# Patient Record
Sex: FEMALE | Race: BLACK OR AFRICAN AMERICAN | Employment: OTHER | URBAN - METROPOLITAN AREA
[De-identification: names, ages, dates, MRNs, and addresses within clinical notes are randomized per-mention and may not be internally consistent; named-entity substitution may affect disease eponyms.]

---

## 2017-01-06 ENCOUNTER — HOSPITAL ENCOUNTER (OUTPATIENT)
Dept: MRI IMAGING | Age: 62
Discharge: HOME | End: 2017-01-06
Attending: ORTHOPAEDIC SURGERY
Payer: COMMERCIAL

## 2017-01-06 VITALS — WEIGHT: 254 LBS | BODY MASS INDEX: 42.93 KG/M2

## 2017-01-06 LAB — CREAT BLD-MCNC: 0.7 MG/DL (ref 0.6–1.3)

## 2017-01-06 PROCEDURE — 71552 MRI CHEST W/O & W/DYE: CPT

## 2017-01-06 PROCEDURE — A9585 GADOBUTROL INJECTION: HCPCS | Performed by: ORTHOPAEDIC SURGERY

## 2017-01-06 PROCEDURE — 82565 ASSAY OF CREATININE: CPT

## 2017-01-06 PROCEDURE — 74011250636 HC RX REV CODE- 250/636: Performed by: ORTHOPAEDIC SURGERY

## 2017-01-06 RX ADMIN — GADOBUTROL 11 ML: 604.72 INJECTION INTRAVENOUS at 16:35

## 2017-02-16 ENCOUNTER — HOSPITAL ENCOUNTER (OUTPATIENT)
Dept: CT IMAGING | Age: 62
Discharge: HOME | End: 2017-02-16
Attending: ORTHOPAEDIC SURGERY
Payer: COMMERCIAL

## 2017-02-16 VITALS
OXYGEN SATURATION: 98 % | SYSTOLIC BLOOD PRESSURE: 136 MMHG | BODY MASS INDEX: 43.02 KG/M2 | WEIGHT: 252 LBS | TEMPERATURE: 97.2 F | RESPIRATION RATE: 18 BRPM | HEART RATE: 68 BPM | HEIGHT: 64 IN | DIASTOLIC BLOOD PRESSURE: 70 MMHG

## 2017-02-16 DIAGNOSIS — M19.011 OSTEOARTHRITIS OF STERNOCLAVICULAR JOINT, RIGHT: ICD-10-CM

## 2017-02-16 DIAGNOSIS — M25.419 EFFUSION OF STERNOCLAVICULAR JOINT: ICD-10-CM

## 2017-02-16 LAB
APPEARANCE FLD: ABNORMAL
COLOR FLD: ABNORMAL
LYMPHOCYTES NFR FLD: 1 %
MONOS+MACROS NFR FLD: 1 %
NEUTS SEG NFR FLD: 98 %
NUC CELL # FLD: 144 /CU MM (ref 0–5)
RBC # FLD: >100 /CU MM
SPECIMEN SOURCE FLD: ABNORMAL

## 2017-02-16 PROCEDURE — 77030003375 HC MRK BIOP BEEK -A

## 2017-02-16 PROCEDURE — 87205 SMEAR GRAM STAIN: CPT | Performed by: ORTHOPAEDIC SURGERY

## 2017-02-16 PROCEDURE — 74011250636 HC RX REV CODE- 250/636: Performed by: RADIOLOGY

## 2017-02-16 PROCEDURE — 87075 CULTR BACTERIA EXCEPT BLOOD: CPT | Performed by: ORTHOPAEDIC SURGERY

## 2017-02-16 PROCEDURE — 10022 CT FINE NDL ASPIR W IMAGE: CPT

## 2017-02-16 PROCEDURE — 74011000250 HC RX REV CODE- 250: Performed by: RADIOLOGY

## 2017-02-16 PROCEDURE — 89050 BODY FLUID CELL COUNT: CPT | Performed by: ORTHOPAEDIC SURGERY

## 2017-02-16 RX ORDER — FENTANYL CITRATE 50 UG/ML
200 INJECTION, SOLUTION INTRAMUSCULAR; INTRAVENOUS
Status: DISCONTINUED | OUTPATIENT
Start: 2017-02-16 | End: 2017-02-20 | Stop reason: HOSPADM

## 2017-02-16 RX ORDER — MIDAZOLAM HYDROCHLORIDE 1 MG/ML
5 INJECTION, SOLUTION INTRAMUSCULAR; INTRAVENOUS
Status: DISCONTINUED | OUTPATIENT
Start: 2017-02-16 | End: 2017-02-20 | Stop reason: HOSPADM

## 2017-02-16 RX ORDER — SODIUM CHLORIDE 9 MG/ML
25 INJECTION, SOLUTION INTRAVENOUS CONTINUOUS
Status: DISCONTINUED | OUTPATIENT
Start: 2017-02-16 | End: 2017-02-20 | Stop reason: HOSPADM

## 2017-02-16 RX ORDER — CHROMIUM PICOLINATE 200 MCG
TABLET ORAL
COMMUNITY
End: 2019-09-13

## 2017-02-16 RX ADMIN — SODIUM CHLORIDE 25 ML/HR: 900 INJECTION, SOLUTION INTRAVENOUS at 09:56

## 2017-02-16 RX ADMIN — SODIUM BICARBONATE 2 ML: 0.2 INJECTION, SOLUTION INTRAVENOUS at 11:10

## 2017-02-16 RX ADMIN — MIDAZOLAM HYDROCHLORIDE 2 MG: 1 INJECTION, SOLUTION INTRAMUSCULAR; INTRAVENOUS at 10:56

## 2017-02-16 RX ADMIN — FENTANYL CITRATE 50 MCG: 50 INJECTION, SOLUTION INTRAMUSCULAR; INTRAVENOUS at 10:56

## 2017-02-16 NOTE — PROGRESS NOTES
Pt discharged via wheelchair escorted by son, discharge instructions given. Pt verbalizes understanding.  Pt denies pain or discomfort

## 2017-02-16 NOTE — H&P
Interventional Radiology History and Physical (Outpatient)    2/16/2017    Patient: Lynne Mackey 59 y.o. female     Referring Physician:  Sugar Mcpherson DO    Chief Complaint: need SC joint aspiration    History of Present Illness: abnormal right SC joint    History:  Past Medical History   Diagnosis Date    Arthritis      BOTH HIPS PAINFUL    Chronic pain     GERD (gastroesophageal reflux disease)     Spinal stenosis, lumbar      Family History   Problem Relation Age of Onset    Other Mother      CARDIAC ARREST DURING HIATAL HERNIA/REFLUX SURGERY;  ALSO, DOES NOT EVARISTO IV DYE    Hypertension Mother     Heart Disease Mother     Hypertension Father     Dementia Father     Heart Disease Father     Elevated Lipids Father      Social History     Social History    Marital status:      Spouse name: N/A    Number of children: N/A    Years of education: N/A     Occupational History    Not on file. Social History Main Topics    Smoking status: Never Smoker    Smokeless tobacco: Never Used    Alcohol use Yes      Comment: RARE ALCOHOL    Drug use: No    Sexual activity: Not on file     Other Topics Concern    Not on file     Social History Narrative       Allergies: Allergies   Allergen Reactions    Aspirin Palpitations    Milk Prot-Turm-Pepper-Pinea Ex Diarrhea     CAN EAT ICE CREAM & CHEESE & BUTTER. DRINKS LACTAID    Minocycline Other (comments)     Told by MD she is allergic. She does not know the reaction. Prior to Admission Medications:  Prior to Admission medications    Medication Sig Start Date End Date Taking? Authorizing Provider   Calcium-Cholecalciferol, D3, 600 mg(1,500mg) -400 unit cap Take  by mouth. Yes Historical Provider   oxyCODONE IR (ROXICODONE) 5 mg immediate release tablet Take 1-2 Tabs by mouth every three (3) hours as needed.  Max Daily Amount: 80 mg. 4/8/15   Jacque Tafoya PA-C   guaiFENesin (MUCINEX) 1,200 mg Ta12 ER tablet Take 1,200 mg by mouth two (2) times a day. Indications: COUGH    Historical Provider   docusate sodium (COLACE) 100 mg capsule Take 100 mg by mouth two (2) times a day. Historical Provider   promethazine-phenyleph-codeine (PROMETHAZINE VC-CODEINE) 6.25-5-10 mg/5 mL syrp Take 5 mL by mouth every six (6) hours as needed for Cough. Historical Provider   gabapentin (NEURONTIN) 300 mg capsule Take 300 mg by mouth nightly. Historical Provider   ACYCLOVIR PO Take  by mouth as needed. Historical Provider       Physical Exam:    Blood pressure 136/70, pulse 68, temperature 97.2 °F (36.2 °C), resp. rate 18, height 5' 4\" (1.626 m), weight 114.3 kg (252 lb), SpO2 98 %. General: alert, cooperative, no distress, appears stated age  Heart: rrr  Lungs: clear to auscultation bilaterally  Abdomen: soft  Neuro: grossly intact  Extremities: extremities wnl    Plan of Care/Planned Procedure:  Risks, benefits, and alternatives reviewed with patient and she agrees to proceed with the procedure.      Jalyn Larios MD

## 2017-02-16 NOTE — DISCHARGE INSTRUCTIONS
BIOPSY DISCHARGE INSTRUCTIONS    General Instructions:     A biopsy is the removal of a small piece of tissue for microscopic examination or testing. Healthy tissue can be obtained for the purpose of tissue-type matching for transplants. Unhealthy tissues are more commonly biopsied to diagnose disease. Spinal Biopsy: This test is sometimes done in conjunction with other procedures. Your clavicle will be sore, as we are taking a small sample of bone and fluid, which is slightly more difficult to sample than tissue. General Biopsy:     A fluid collection can grow in any area of the body, and we are taking a specimen as ordered by your doctor. The risks are the same. They include bleeding, pain, and infection. Home Care Instructions: You may resume your regular diet and medication regimen. Do not drink alcohol, drive, or make any important legal decisions in the next 24 hours. Do not lift anything heavier than a gallon of milk until the soreness goes away. You may use over the counter acetaminophen or ibuprofen for the soreness. You may apply an ice pack to the affected area for 20-30 minutes at time for the first 24 hours. After that, you may apply a heat pack. Call If:     You should call your Physician and/or the Radiology Nurse if you have any questions or concerns about the biopsy site. Call if you should have increased pain, fever, redness, drainage, or bleeding more than a small spot on the bandage. Follow-Up Instructions: Please see your ordering doctor as he/she has requested.       To Reach Us:  Call [phone] and ask for nurse on call      Patient Signature:  Date: 2/16/2017  Discharging Nurse: Yolanda Silvestre RN

## 2017-03-02 LAB
BACTERIA SPEC CULT: NORMAL
BACTERIA SPEC CULT: NORMAL
GRAM STN SPEC: NORMAL
SERVICE CMNT-IMP: NORMAL
SERVICE CMNT-IMP: NORMAL

## 2018-04-09 ENCOUNTER — HOSPITAL ENCOUNTER (OUTPATIENT)
Dept: MRI IMAGING | Age: 63
Discharge: HOME | End: 2018-04-09
Attending: ORTHOPAEDIC SURGERY
Payer: COMMERCIAL

## 2018-04-09 DIAGNOSIS — M51.36 DEGENERATION OF LUMBAR INTERVERTEBRAL DISC: ICD-10-CM

## 2018-04-09 DIAGNOSIS — Z98.1 S/P LUMBAR FUSION: ICD-10-CM

## 2018-04-09 PROCEDURE — 72148 MRI LUMBAR SPINE W/O DYE: CPT

## 2019-03-20 ENCOUNTER — HOSPITAL ENCOUNTER (OUTPATIENT)
Dept: BONE DENSITY | Age: 64
Discharge: HOME | End: 2019-03-20
Attending: INTERNAL MEDICINE
Payer: MEDICARE

## 2019-03-20 DIAGNOSIS — Z78.0 MENOPAUSE: ICD-10-CM

## 2019-03-20 DIAGNOSIS — Z13.820 SCREENING FOR OSTEOPOROSIS: ICD-10-CM

## 2019-03-20 DIAGNOSIS — M89.9 DISORDER OF BONE: ICD-10-CM

## 2019-03-20 DIAGNOSIS — M15.9 PRIMARY OSTEOARTHRITIS INVOLVING MULTIPLE JOINTS: ICD-10-CM

## 2019-03-20 DIAGNOSIS — Z00.00 ROUTINE GENERAL MEDICAL EXAMINATION AT A HEALTH CARE FACILITY: ICD-10-CM

## 2019-03-20 DIAGNOSIS — M85.9 DISORDER OF BONE DENSITY AND STRUCTURE, UNSPECIFIED: ICD-10-CM

## 2019-03-20 DIAGNOSIS — E55.9 VITAMIN D DEFICIENCY: ICD-10-CM

## 2019-03-20 PROCEDURE — 77080 DXA BONE DENSITY AXIAL: CPT

## 2019-04-15 LAB — CREATININE, EXTERNAL: 0.77

## 2019-09-13 ENCOUNTER — HOSPITAL ENCOUNTER (EMERGENCY)
Age: 64
Discharge: HOME | End: 2019-09-13
Attending: EMERGENCY MEDICINE
Payer: MEDICARE

## 2019-09-13 VITALS
RESPIRATION RATE: 16 BRPM | WEIGHT: 259.7 LBS | OXYGEN SATURATION: 100 % | SYSTOLIC BLOOD PRESSURE: 172 MMHG | TEMPERATURE: 98.3 F | HEART RATE: 74 BPM | DIASTOLIC BLOOD PRESSURE: 89 MMHG | HEIGHT: 64 IN | BODY MASS INDEX: 44.34 KG/M2

## 2019-09-13 DIAGNOSIS — S62.667B OPEN NONDISPLACED FRACTURE OF DISTAL PHALANX OF LEFT LITTLE FINGER, INITIAL ENCOUNTER: ICD-10-CM

## 2019-09-13 DIAGNOSIS — S61.217A LACERATION OF LEFT LITTLE FINGER WITHOUT FOREIGN BODY WITHOUT DAMAGE TO NAIL, INITIAL ENCOUNTER: Primary | ICD-10-CM

## 2019-09-13 PROCEDURE — 99283 EMERGENCY DEPT VISIT LOW MDM: CPT

## 2019-09-13 PROCEDURE — 77030018846 HC SOL IRR STRL H20 ICUM -A

## 2019-09-13 PROCEDURE — 74011250637 HC RX REV CODE- 250/637: Performed by: EMERGENCY MEDICINE

## 2019-09-13 PROCEDURE — 77030002916 HC SUT ETHLN J&J -A

## 2019-09-13 PROCEDURE — 75810000293 HC SIMP/SUPERF WND  RPR

## 2019-09-13 PROCEDURE — 74011000250 HC RX REV CODE- 250

## 2019-09-13 PROCEDURE — 74011000250 HC RX REV CODE- 250: Performed by: EMERGENCY MEDICINE

## 2019-09-13 RX ORDER — CEPHALEXIN 250 MG/1
500 CAPSULE ORAL
Status: COMPLETED | OUTPATIENT
Start: 2019-09-13 | End: 2019-09-13

## 2019-09-13 RX ORDER — MELATONIN
COMMUNITY
Start: 2015-03-02

## 2019-09-13 RX ORDER — BACITRACIN 500 UNIT/G
1 PACKET (EA) TOPICAL
Status: COMPLETED | OUTPATIENT
Start: 2019-09-13 | End: 2019-09-13

## 2019-09-13 RX ORDER — CEPHALEXIN 500 MG/1
500 CAPSULE ORAL 3 TIMES DAILY
Qty: 30 CAP | Refills: 0 | Status: SHIPPED | OUTPATIENT
Start: 2019-09-13 | End: 2019-09-23

## 2019-09-13 RX ORDER — LIDOCAINE HYDROCHLORIDE 20 MG/ML
1 INJECTION, SOLUTION INFILTRATION; PERINEURAL ONCE
Status: COMPLETED | OUTPATIENT
Start: 2019-09-13 | End: 2019-09-13

## 2019-09-13 RX ORDER — CEPHALEXIN 500 MG/1
500 CAPSULE ORAL 3 TIMES DAILY
Qty: 30 CAP | Refills: 0 | Status: SHIPPED | OUTPATIENT
Start: 2019-09-13 | End: 2019-09-13

## 2019-09-13 RX ORDER — AMOXICILLIN 500 MG/1
500 CAPSULE ORAL
COMMUNITY
End: 2019-09-13

## 2019-09-13 RX ORDER — LIDOCAINE HYDROCHLORIDE 20 MG/ML
INJECTION, SOLUTION INFILTRATION; PERINEURAL
Status: COMPLETED
Start: 2019-09-13 | End: 2019-09-13

## 2019-09-13 RX ORDER — MONTELUKAST SODIUM 10 MG/1
TABLET ORAL
Refills: 0 | COMMUNITY
Start: 2019-08-19 | End: 2019-11-21

## 2019-09-13 RX ORDER — PRAVASTATIN SODIUM 10 MG/1
10 TABLET ORAL
Refills: 1 | COMMUNITY
Start: 2019-07-22

## 2019-09-13 RX ADMIN — LIDOCAINE HYDROCHLORIDE 20 MG: 20 INJECTION, SOLUTION INFILTRATION; PERINEURAL at 16:49

## 2019-09-13 RX ADMIN — CEPHALEXIN 500 MG: 250 CAPSULE ORAL at 15:42

## 2019-09-13 RX ADMIN — BACITRACIN 1 PACKET: 500 OINTMENT TOPICAL at 17:46

## 2019-09-13 NOTE — CONSULTS
ORTHO:    Telephone consult with Dr Javier Padilla who describes a 68yo female who sustained a left 5th digit using a . NVI per ER with horizontal laceration extending around the medial side of finger to volar side just proximal to the nailbed; no apparent nail involvement or loosening per ED attending. Recommend cleaning wound and loose approximation of skin as needed with splinting and follow up with Dr. Tri Bradley, call Monday for appointment. Dr Rupesh Childs aware of patient and in agreement with current plan    Thank you for allowing us to take part in this patients care.     Karlene Koenig PA-C  Orthopedic Trauma Service  [address] [facility]

## 2019-09-13 NOTE — DISCHARGE INSTRUCTIONS
Patient Education        Finger Fracture: Care Instructions  Your Care Instructions    Breaks in the bones of the finger usually heal well in about 3 to 4 weeks. The pain and swelling from a broken finger can last for weeks. But it should steadily improve, starting a few days after you break it. It is very important that you wear and take care of the cast or splint exactly as your doctor tells you to so that your finger heals properly and does not end up crooked. Wearing a splint may interfere with your normal activities. Ask for help with daily tasks if you need it. You heal best when you take good care of yourself. Eat a variety of healthy foods, and don't smoke. Follow-up care is a key part of your treatment and safety. Be sure to make and go to all appointments, and call your doctor if you are having problems. It's also a good idea to know your test results and keep a list of the medicines you take. How can you care for yourself at home? · If your doctor put a splint on your finger, wear the splint exactly as directed. Do not remove it until your doctor says that you can. · Keep your hand raised above the level of your heart as much as you can. This will help reduce swelling. · Put ice or a cold pack on your finger for 10 to 20 minutes at a time. Try to do this every 1 to 2 hours for the next 3 days (when you are awake) or until the swelling goes down. Put a thin cloth between the ice and your skin. Keep the splint dry. · Be safe with medicines. Take pain medicines exactly as directed. ? If the doctor gave you a prescription medicine for pain, take it as prescribed. ? If you are not taking a prescription pain medicine, ask your doctor if you can take an over-the-counter medicine. When should you call for help? Call 911 anytime you think you may need emergency care.  For example, call if:    · Your finger is cool or pale or changes color.    Call your doctor now or seek immediate medical care if:    · Your pain gets much worse.     · You have tingling, weakness, or numbness in your finger.     · You have signs of infection, such as:  ? Increased pain, swelling, warmth, or redness. ? Red streaks leading from the area. ? Pus draining from the area. ? Swollen lymph nodes in your neck, armpits, or groin. ? A fever.    Watch closely for changes in your health, and be sure to contact your doctor if:    · Your finger is not steadily improving. Where can you learn more? Go to http://jon-dimitry.info/. Enter O009 in the search box to learn more about \"Finger Fracture: Care Instructions. \"  Current as of: September 20, 2018  Content Version: 12.1  © 9519-0820 [employer]. Care instructions adapted under license by [employer] (which disclaims liability or warranty for this information). If you have questions about a medical condition or this instruction, always ask your healthcare professional. [place] [address] any warranty or liability for your use of this information. Patient Education        Cuts on the Hand Closed With Stitches: Care Instructions  Your Care Instructions    A cut on your hand can be on your fingers, your thumb, or the front or back of your hand. Sometimes a cut can injure the tendons, blood vessels, or nerves of your hand. The doctor used stitches to close the cut. Using stitches also helps the cut heal and reduces scarring. The doctor may have given you a splint to help prevent you from moving your hand, fingers, or thumb. If the cut went deep and through the skin, the doctor put in two layers of stitches. The deeper layer brings the deep part of the cut together. These stitches will dissolve and don't need to be removed. The stitches in the upper layer are the ones you see on the cut. You will probably have a bandage. You will need to have the stitches removed, usually in 7 to 14 days.  The doctor may suggest that you see a hand specialist if the cut is very deep or if you have trouble moving your fingers or have less feeling in your hand. The doctor has checked you carefully, but problems can develop later. If you notice any problems or new symptoms, get medical treatment right away. Follow-up care is a key part of your treatment and safety. Be sure to make and go to all appointments, and call your doctor if you are having problems. It's also a good idea to know your test results and keep a list of the medicines you take. How can you care for yourself at home? · Keep the cut dry for the first 24 to 48 hours. After this, you can shower if your doctor okays it. Pat the cut dry. · Don't soak the cut, such as in a bathtub. Your doctor will tell you when it's safe to get the cut wet. · If your doctor told you how to care for your cut, follow your doctor's instructions. If you did not get instructions, follow this general advice:  ? After the first 24 to 48 hours, wash around the cut with clean water 2 times a day. Don't use hydrogen peroxide or alcohol, which can slow healing. ? You may cover the cut with a thin layer of petroleum jelly, such as Vaseline, and a nonstick bandage. ? Apply more petroleum jelly and replace the bandage as needed. · Prop up the sore hand on a pillow anytime you sit or lie down during the next 3 days. Try to keep it above the level of your heart. This will help reduce swelling. · Avoid any activity that could cause your cut to reopen. · Do not remove the stitches on your own. Your doctor will tell you when to come back to have the stitches removed. · Be safe with medicines. Take pain medicines exactly as directed. ? If the doctor gave you a prescription medicine for pain, take it as prescribed. ? If you are not taking a prescription pain medicine, ask your doctor if you can take an over-the-counter medicine. When should you call for help?   Call your doctor now or seek immediate medical care if:    · You have new pain, or your pain gets worse.     · The skin near the cut is cold or pale or changes color.     · You have tingling, weakness, or numbness near the cut.     · The cut starts to bleed, and blood soaks through the bandage. Oozing small amounts of blood is normal.     · You have trouble moving the area of the hand near the cut.     · You have symptoms of infection, such as:  ? Increased pain, swelling, warmth, or redness around the cut.  ? Red streaks leading from the cut.  ? Pus draining from the cut.  ? A fever.    Watch closely for changes in your health, and be sure to contact your doctor if:    · You do not get better as expected. Where can you learn more? Go to http://jon-dimitry.info/. Enter T250 in the search box to learn more about \"Cuts on the Hand Closed With Stitches: Care Instructions. \"  Current as of: September 23, 2018  Content Version: 12.1  © 8159-3583 [employer]. Care instructions adapted under license by [employer] (which disclaims liability or warranty for this information). If you have questions about a medical condition or this instruction, always ask your healthcare professional. [address] any warranty or liability for your use of this information.

## 2019-09-13 NOTE — ED PROVIDER NOTES
HPI     68-year-old female [facility] for open fracture and laceration to left fifth finger from beulah celestin. Patient has a history of diabetes. No active bleeding at this time. Patient was updated with a [facility] at [facility].  Full range of motion of the finger. Denies any other injuries or complaints at this time.     Past Medical History:   Diagnosis Date    Arthritis     BOTH HIPS PAINFUL    Chronic pain     Diabetes (HCC)     GERD (gastroesophageal reflux disease)     High cholesterol     Spinal stenosis, lumbar        Past Surgical History:   Procedure Laterality Date    HX HEENT  1980s    VOCAL CORD POLYP EXCISED    HX ORTHOPAEDIC  1990s    FOOT SURGERY    HX ORTHOPAEDIC  May 2012    total hip replacement-RIGHT    HX ORTHOPAEDIC      back surgery    HX TONSILLECTOMY  CHILDHOOD         Family History:   Problem Relation Age of Onset    Other Mother         CARDIAC ARREST DURING HIATAL HERNIA/REFLUX SURGERY;  ALSO, DOES NOT EVARISTO IV DYE    Hypertension Mother     Heart Disease Mother     Hypertension Father     Dementia Father     Heart Disease Father     Elevated Lipids Father        Social History     Socioeconomic History    Marital status:      Spouse name: Not on file    Number of children: Not on file    Years of education: Not on file    Highest education level: Not on file   Occupational History    Not on file   Social Needs    Financial resource strain: Not on file    Food insecurity:     Worry: Not on file     Inability: Not on file    Transportation needs:     Medical: Not on file     Non-medical: Not on file   Tobacco Use    Smoking status: Never Smoker    Smokeless tobacco: Never Used   Substance and Sexual Activity    Alcohol use: Yes     Comment: RARE ALCOHOL    Drug use: No    Sexual activity: Not on file   Lifestyle    Physical activity:     Days per week: Not on file     Minutes per session: Not on file    Stress: Not on file Relationships    Social connections:     Talks on phone: Not on file     Gets together: Not on file     Attends Sabianist service: Not on file     Active member of club or organization: Not on file     Attends meetings of clubs or organizations: Not on file     Relationship status: Not on file    Intimate partner violence:     Fear of current or ex partner: Not on file     Emotionally abused: Not on file     Physically abused: Not on file     Forced sexual activity: Not on file   Other Topics Concern    Not on file   Social History Narrative    Not on file         ALLERGIES: Aspirin; Milk prot-turm-pepper-pineappl; and Minocycline    Review of Systems   Musculoskeletal: Negative for joint swelling. No deformity to hand   Skin: Negative for color change and wound. Vitals:    09/13/19 1518   BP: 172/89   Pulse: 74   Resp: 16   Temp: 98.3 °F (36.8 °C)   SpO2: 100%   Weight: 117.8 kg (259 lb 11.2 oz)   Height: 5' 4\" (1.626 m)            Physical Exam   Constitutional: She is oriented to person, place, and time. She appears well-developed and well-nourished. Musculoskeletal: Normal range of motion. She exhibits no deformity. Neurological: She is alert and oriented to person, place, and time. Nursing note and vitals reviewed. Left fifth finger with laceration with bleeding controlled. Nail not loose. Full ROM of finger. MDM   Open fracture with laceration. Tiger texted with orthopedics Dr. Spencer See who agreed with just closure of the laceration. Nail was not loose. Will place on Keflex.'s finger splint. Follow-up with Dr. Ryan Sessions. Wound Repair  Date/Time: 9/13/2019 5:42 PM  Performed by: attendingPreparation: skin prepped with Betadine  Pre-procedure re-eval: Immediately prior to the procedure, the patient was reevaluated and found suitable for the planned procedure and any planned medications.   Time out: Immediately prior to the procedure a time out was called to verify the correct patient, procedure, equipment, staff and marking as appropriate. .  Location: left 5th finger. Wound length:2.5 cm or less  Anesthesia: digital block    Anesthesia:  Local Anesthetic: lidocaine 2% without epinephrine  Anesthetic total: 2 mL  Foreign bodies: no foreign bodies  Irrigation solution: saline  Irrigation method: syringe  Debridement: none  Skin closure: 5-0 nylon  Number of sutures: 3  Technique: simple and interrupted  Laceration repair approximation: close as much as possible given edema. Dressing: antibiotic ointment and tube gauze  Patient tolerance: Patient tolerated the procedure well with no immediate complications  My total time at bedside, performing this procedure was 16-30 minutes.

## 2019-09-13 NOTE — ED TRIAGE NOTES
Pt. Was sent by [facility] after pt. Was cutting shrubs with electric shrub fawad and cut her left 5th digit. Pt. Brought x-ray and was told it was fracture and did not suture finger after MD \"stuck a needle in it and hit bone\".

## 2019-11-12 LAB — HBA1C MFR BLD HPLC: 5.6 %

## 2019-11-21 ENCOUNTER — OFFICE VISIT (OUTPATIENT)
Dept: CARDIOLOGY CLINIC | Age: 64
End: 2019-11-21

## 2019-11-21 VITALS
SYSTOLIC BLOOD PRESSURE: 138 MMHG | DIASTOLIC BLOOD PRESSURE: 72 MMHG | OXYGEN SATURATION: 99 % | WEIGHT: 259 LBS | BODY MASS INDEX: 44.22 KG/M2 | HEART RATE: 57 BPM | HEIGHT: 64 IN | RESPIRATION RATE: 16 BRPM

## 2019-11-21 DIAGNOSIS — R07.9 CHEST PAIN, UNSPECIFIED TYPE: Primary | ICD-10-CM

## 2019-11-21 DIAGNOSIS — I89.0 SECONDARY LYMPHEDEMA: ICD-10-CM

## 2019-11-21 DIAGNOSIS — E78.5 DYSLIPIDEMIA: ICD-10-CM

## 2019-11-21 DIAGNOSIS — E11.69 TYPE 2 DIABETES MELLITUS WITH OTHER SPECIFIED COMPLICATION, WITHOUT LONG-TERM CURRENT USE OF INSULIN (HCC): ICD-10-CM

## 2019-11-21 DIAGNOSIS — E66.01 OBESITY, MORBID (HCC): ICD-10-CM

## 2019-11-21 DIAGNOSIS — R06.09 DOE (DYSPNEA ON EXERTION): ICD-10-CM

## 2019-11-21 RX ORDER — IBUPROFEN 200 MG
200 TABLET ORAL
COMMUNITY
End: 2022-04-01

## 2019-11-21 NOTE — PROGRESS NOTES
Chief Complaint   Patient presents with    Chest Pain (Angina)    New Patient     1. Have you been to the ER, urgent care clinic since your last visit? Hospitalized since your last visit? No    2. Have you seen or consulted any other health care providers outside of the [address] [place] [address] since your last visit? Include any pap smears or colon screening. No    3) Do you have an Advance Directive on file?  yes

## 2019-11-21 NOTE — PROGRESS NOTES
[facility]  [phone]    Reason for consult: chest tightness  Requesting physician: Dr. 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 year-old who presents for evaluation of chest tightness. In 10/19 she went on vacation x 1 week to the beach  Had some chest tightness with walking 3 long blocks, it was surprising and disconcerting. Had not walked much prior to that, had been swimming and going to gym in the spring but not recently   She had a few episodes of chest tightness with walking that week but has not had any chest tightness since she has been home- but has not done that same type of exertion. Father on hospice, mother age 80 and she is caring for them so not exercising currently   She started having burning in her toes on her left foot on vacation as well   No leg claudication, only has leg cramps when doing leg weights  No dyspnea with exertion, not winded with one flight of stairs, might be with two flights of stairs  No PND  No palpitations, no dizziness or syncope   Right ankle swells after surgery but she also has significant LE edema in left leg which she did not realize and it looks symmetrical today. Has lower back pain, hx of lumbar stenosis   Adds salt to food  Denies hx of HTN, reports a history of white coat HTN, says she never needed anti-hypertensive medication, reports that her SBP is usually in the 120-130mmhg range  She has cut out sodas and doing the keto diet and has lost 10 lbs    Labs 11/12/19  TSH 1.7, A1C 5.6%  CBC ok, CMP ok    She wants us to communicate with her primary care regarding her testing and treatment, any medication changes, etc We reviewed [facility] and our practices on communication. Assessment/Plan:  1. Chest tightness - will proceed with nuclear stress test to assess for ischemia, would likely have suboptimal imaging with stress echo   Reviewed advantages and disadvantages of both and she chooses florin.    2.  DM Type 2 - A1C down to 5.6%, diet controlled  3. Dyslipidemia - , TG 61, LDL 94, HDL 54 in 11/19 on pravastatin  4. Vitamin D Deficiency - on 50,000 units once/week  5. Secondary lymphedema - will assess EF and valves with TTE, advised her to limit sodium, exercise, elevate legs, wear compression socks, etc.   Could consider lymphapress if not improving. Soc Hx: no tobacco use, rare etoh use  Fam Hx: no early CAD    She  has a past medical history of Arthritis, Arthritis, Chronic pain, Diabetes ([address] Difficulty sleeping, Frequent urination, GERD (gastroesophageal reflux disease), High cholesterol, and Spinal stenosis, lumbar. Cardiovascular ROS: positive for chest tightness, no dyspnea on exertion  Respiratory ROS: no cough, shortness of breath, or wheezing  Neurological ROS: no TIA or stroke symptoms  All other systems negative except as above. PE  Vitals:    11/21/19 0841   BP: 138/72   Pulse: (!) 57   Resp: 16   SpO2: 99%   Weight: 259 lb (117.5 kg)   Height: 5' 4\" (1.626 m)    Body mass index is 44.46 kg/m².    General appearance - alert, well appearing, and in no distress  Mental status - affect appropriate to mood  Eyes - sclera anicteric, moist mucous membranes  Neck - supple  Lymphatics - not assessed  Chest - clear to auscultation, no wheezes, rales or rhonchi  Heart - normal rate, regular rhythm, normal S1, S2, 1/6 VALARIE at RSB   Abdomen - soft, nontender, nondistended, obese  Back exam - full range of motion, no tenderness  Neurological - cranial nerves II through XII grossly intact, no focal deficit  Musculoskeletal - no muscular tenderness noted, normal strength  Extremities - peripheral pulses normal, 2+ LE edema,   Skin - normal coloration  no rashes, dry skin shins bilat    12 lead ECG: NSR    Recent Labs:  No results found for: CHOL, CHOLX, CHLST, CHOLV, 875110, HDL, HDLP, LDL, LDLC, DLDLP, TGLX, TRIGL, TRIGP, CHHD, CHHDX  Lab Results   Component Value Date/Time    Creatinine 0.68 04/07/2015 04:25 AM     Lab Results   Component Value Date/Time    BUN 15 04/07/2015 04:25 AM     Lab Results   Component Value Date/Time    Potassium 4.3 04/07/2015 04:25 AM     Lab Results   Component Value Date/Time    Hemoglobin A1c 6.5 (H) 03/18/2015 10:57 AM     Lab Results   Component Value Date/Time    HGB 12.3 04/08/2015 04:55 AM     Lab Results   Component Value Date/Time    PLATELET 436 57/43/5713 10:57 AM       Reviewed:  Past Medical History:   Diagnosis Date    Arthritis     BOTH HIPS PAINFUL    Arthritis     Chronic pain     Diabetes ([address]     Difficulty sleeping     Frequent urination     GERD (gastroesophageal reflux disease)     High cholesterol     Spinal stenosis, lumbar      Social History     Tobacco Use   Smoking Status Never Smoker   Smokeless Tobacco Never Used     Social History     Substance and Sexual Activity   Alcohol Use Yes    Comment: RARE ALCOHOL     Allergies   Allergen Reactions    Aspirin Palpitations    Milk Prot-Turm-Pepper-Pineappl Diarrhea     CAN EAT ICE CREAM & CHEESE & BUTTER. DRINKS LACTAID    Minocycline Other (comments)     Told by MD she is allergic. She does not know the reaction. Current Outpatient Medications   Medication Sig    ibuprofen (ADVIL) 200 mg tablet Take 200 mg by mouth.  mirabegron ER (MYRBETRIQ) 25 mg ER tablet Take 25 mg by mouth daily.  cholecalciferol (VITAMIN D3) 1,000 unit tablet cholecalciferol (vitamin D3) 1,000 unit (25 mcg) tablet    pravastatin (PRAVACHOL) 10 mg tablet Take 10 mg by mouth. No current facility-administered medications for this visit.         Gabriella Jones MD  [facility] [address] [facility] [address]  [facility]  [phone]

## 2019-11-21 NOTE — PATIENT INSTRUCTIONS
Please stop adding salt to your food, do not cook with salt Please elevate your legs at least twice daily, please begin wearing compression socks daily, please exercise as much as you are able to

## 2019-12-17 ENCOUNTER — TELEPHONE (OUTPATIENT)
Dept: CARDIOLOGY CLINIC | Age: 64
End: 2019-12-17

## 2019-12-17 NOTE — TELEPHONE ENCOUNTER
Attempted to call patient to R/S Nuclear appt due to Isotope shortage. Please forward call to Shyla Mosqueda in [employer].

## 2019-12-30 ENCOUNTER — TELEPHONE (OUTPATIENT)
Dept: CARDIOLOGY CLINIC | Age: 64
End: 2019-12-30

## 2019-12-30 NOTE — TELEPHONE ENCOUNTER
----- Message from Yi Singleton MD sent at 12/30/2019  3:21 PM EST -----  Echo: Looks good    Echo results given to patient.  2 pt identifiers used

## 2019-12-30 NOTE — TELEPHONE ENCOUNTER
----- Message from Pricila Zarate MD sent at 12/30/2019  3:18 PM EST -----  Nuclear: normal    Nuclear results given to patient.  2 pt identifiers used

## 2020-03-04 ENCOUNTER — HOSPITAL ENCOUNTER (OUTPATIENT)
Dept: ULTRASOUND IMAGING | Age: 65
Discharge: HOME | End: 2020-03-04
Payer: MEDICARE

## 2020-03-04 DIAGNOSIS — M79.605 ACUTE PAIN OF LOWER EXTREMITY, LEFT: ICD-10-CM

## 2020-03-04 PROCEDURE — 93971 EXTREMITY STUDY: CPT

## 2020-03-16 ENCOUNTER — HOSPITAL ENCOUNTER (OUTPATIENT)
Dept: GENERAL RADIOLOGY | Age: 65
Discharge: HOME | End: 2020-03-16
Attending: INTERNAL MEDICINE
Payer: MEDICARE

## 2020-03-16 DIAGNOSIS — R05.3 CHRONIC COUGH: ICD-10-CM

## 2020-03-16 PROCEDURE — 71046 X-RAY EXAM CHEST 2 VIEWS: CPT

## 2020-07-02 ENCOUNTER — HOSPITAL ENCOUNTER (OUTPATIENT)
Dept: ULTRASOUND IMAGING | Age: 65
Discharge: HOME | End: 2020-07-02
Attending: ORTHOPAEDIC SURGERY
Payer: MEDICARE

## 2020-07-02 DIAGNOSIS — M79.89 SWELLING OF LIMB: ICD-10-CM

## 2020-07-02 PROCEDURE — 93971 EXTREMITY STUDY: CPT

## 2020-08-06 ENCOUNTER — HOSPITAL ENCOUNTER (OUTPATIENT)
Dept: GENERAL RADIOLOGY | Age: 65
Discharge: HOME | End: 2020-08-06
Attending: INTERNAL MEDICINE
Payer: MEDICARE

## 2020-08-06 ENCOUNTER — HOSPITAL ENCOUNTER (OUTPATIENT)
Dept: ULTRASOUND IMAGING | Age: 65
Discharge: HOME | End: 2020-08-06
Attending: INTERNAL MEDICINE
Payer: MEDICARE

## 2020-08-06 DIAGNOSIS — M79.89 SWELLING OF LIMB: ICD-10-CM

## 2020-08-06 DIAGNOSIS — M54.2 CERVICALGIA: ICD-10-CM

## 2020-08-06 DIAGNOSIS — M79.605 LEFT LEG PAIN: ICD-10-CM

## 2020-08-06 PROCEDURE — 93971 EXTREMITY STUDY: CPT | Performed by: INTERNAL MEDICINE

## 2020-08-06 PROCEDURE — 73590 X-RAY EXAM OF LOWER LEG: CPT | Performed by: INTERNAL MEDICINE

## 2020-08-06 PROCEDURE — 72050 X-RAY EXAM NECK SPINE 4/5VWS: CPT | Performed by: INTERNAL MEDICINE

## 2021-08-03 ENCOUNTER — TELEPHONE (OUTPATIENT)
Dept: CARDIOLOGY CLINIC | Age: 66
End: 2021-08-03

## 2021-08-03 NOTE — TELEPHONE ENCOUNTER
LVM for patient to return call at earliest convenience. I returned call, 2 pt identifiers used    Information requested as to new practice provider will be going to .

## 2022-01-05 ENCOUNTER — OFFICE VISIT (OUTPATIENT)
Dept: ORTHOPEDIC SURGERY | Age: 67
End: 2022-01-05
Payer: MEDICARE

## 2022-01-05 VITALS — HEIGHT: 64 IN | WEIGHT: 233 LBS | BODY MASS INDEX: 39.78 KG/M2

## 2022-01-05 DIAGNOSIS — M17.12 PRIMARY OSTEOARTHRITIS OF LEFT KNEE: ICD-10-CM

## 2022-01-05 DIAGNOSIS — M17.11 PRIMARY OSTEOARTHRITIS OF RIGHT KNEE: Primary | ICD-10-CM

## 2022-01-05 PROCEDURE — G8536 NO DOC ELDER MAL SCRN: HCPCS | Performed by: PHYSICIAN ASSISTANT

## 2022-01-05 PROCEDURE — 1090F PRES/ABSN URINE INCON ASSESS: CPT | Performed by: PHYSICIAN ASSISTANT

## 2022-01-05 PROCEDURE — G8417 CALC BMI ABV UP PARAM F/U: HCPCS | Performed by: PHYSICIAN ASSISTANT

## 2022-01-05 PROCEDURE — 20610 DRAIN/INJ JOINT/BURSA W/O US: CPT | Performed by: PHYSICIAN ASSISTANT

## 2022-01-05 PROCEDURE — 99214 OFFICE O/P EST MOD 30 MIN: CPT | Performed by: PHYSICIAN ASSISTANT

## 2022-01-05 PROCEDURE — G8432 DEP SCR NOT DOC, RNG: HCPCS | Performed by: PHYSICIAN ASSISTANT

## 2022-01-05 PROCEDURE — G8427 DOCREV CUR MEDS BY ELIG CLIN: HCPCS | Performed by: PHYSICIAN ASSISTANT

## 2022-01-05 PROCEDURE — 1101F PT FALLS ASSESS-DOCD LE1/YR: CPT | Performed by: PHYSICIAN ASSISTANT

## 2022-01-05 PROCEDURE — 3017F COLORECTAL CA SCREEN DOC REV: CPT | Performed by: PHYSICIAN ASSISTANT

## 2022-01-05 PROCEDURE — G8399 PT W/DXA RESULTS DOCUMENT: HCPCS | Performed by: PHYSICIAN ASSISTANT

## 2022-01-05 RX ORDER — METFORMIN HYDROCHLORIDE 500 MG/1
1000 TABLET, EXTENDED RELEASE ORAL DAILY
COMMUNITY
Start: 2021-03-20

## 2022-01-05 RX ORDER — BUPIVACAINE HYDROCHLORIDE 2.5 MG/ML
5 INJECTION, SOLUTION INFILTRATION; PERINEURAL ONCE
Status: COMPLETED | OUTPATIENT
Start: 2022-01-05 | End: 2022-01-05

## 2022-01-05 RX ORDER — DICLOFENAC SODIUM 75 MG/1
75 TABLET, DELAYED RELEASE ORAL 2 TIMES DAILY
COMMUNITY
End: 2022-04-01

## 2022-01-05 RX ORDER — GUAIFENESIN 100 MG/5ML
81 LIQUID (ML) ORAL DAILY
COMMUNITY
End: 2022-03-24

## 2022-01-05 RX ORDER — DICLOFENAC SODIUM 75 MG/1
75 TABLET, DELAYED RELEASE ORAL 2 TIMES DAILY
Qty: 60 TABLET | Refills: 1 | Status: SHIPPED | OUTPATIENT
Start: 2022-01-05 | End: 2022-04-01

## 2022-01-05 RX ORDER — TRIAMCINOLONE ACETONIDE 40 MG/ML
40 INJECTION, SUSPENSION INTRA-ARTICULAR; INTRAMUSCULAR ONCE
Status: COMPLETED | OUTPATIENT
Start: 2022-01-05 | End: 2022-01-05

## 2022-01-05 RX ORDER — DICLOFENAC SODIUM 10 MG/G
GEL TOPICAL 4 TIMES DAILY
COMMUNITY
End: 2022-03-24

## 2022-01-05 RX ORDER — DICLOFENAC SODIUM 10 MG/G
2 GEL TOPICAL 4 TIMES DAILY
Qty: 1 EACH | Refills: 2 | Status: SHIPPED | OUTPATIENT
Start: 2022-01-05 | End: 2022-04-01

## 2022-01-05 RX ADMIN — TRIAMCINOLONE ACETONIDE 40 MG: 40 INJECTION, SUSPENSION INTRA-ARTICULAR; INTRAMUSCULAR at 10:50

## 2022-01-05 RX ADMIN — BUPIVACAINE HYDROCHLORIDE 12.5 MG: 2.5 INJECTION, SOLUTION INFILTRATION; PERINEURAL at 10:50

## 2022-01-05 RX ADMIN — BUPIVACAINE HYDROCHLORIDE 12.5 MG: 2.5 INJECTION, SOLUTION INFILTRATION; PERINEURAL at 10:49

## 2022-01-05 NOTE — PROGRESS NOTES
Seema Mackey (: 1955) is a 74 y.o. female, patient, here for evaluation of the following chief complaint(s):  Knee Pain (bilateral knees)       SUBJECTIVE/OBJECTIVE:  Seema Mackey presents today complaining of bilateral knee pain, right worse than left. She was scheduled for a left total knee arthroplasty on 2022 which she canceled because she was not hurting and actually moving around well. She decided to go for a walk because she was able to lose 30 pounds in the past from walking for exercise. She walks 2.5 miles at [facility] and had acute exacerbation of pain afterward. Subjective instability during the walk toward the end, right knee pain worse than left. She has had to use a walker since then. Pain is diffuse, sometimes medial, sometimes posterior. She uses diclofenac twice a day which helps as well as Voltaren gel which may or may not help. We asked her after her last visit prior to surgery to try aspirin at home, no issues. Conservative treatment to date includes cortisone and hyaluronic acid injections. PHYSICAL EXAM:  Vitals: Ht 5' 4\" (1.626 m)   Wt 233 lb (105.7 kg)   BMI 39.99 kg/m²   Body mass index is 39.99 kg/m². 74y.o. year old F in no acute distress. Ambulates with a limp, walker for assistance. Bilateral knee skin warm, dry. Diffuse joint line tenderness to palpation bilaterally. No effusions. Range of motion 0-105 degrees. Motor 5/5. No instability. No distal edema. IMAGING:  Radiographs: Prior x-rays reviewed which reveal left knee with complete loss of medial joint space, moderate to severe loss of medial joint space right knee. Moderate patellofemoral osteoarthritis bilaterally. ASSESSMENT/PLAN:  1.  Primary osteoarthritis of right knee  -     bupivacaine HCl (MARCAINE) 0.25 % (2.5 mg/mL) injection 12.5 mg; 12.5 mg (5 mL), Other, ONCE, 1 dose, On Wed 22 at 1100  -     triamcinolone acetonide (KENALOG-40) 40 mg/mL injection 40 mg; 40 mg, Intra artICUlar, ONCE, 1 dose, On Wed 1/5/22 at 1100  -     REFERRAL TO PHYSICAL THERAPY  2. Primary osteoarthritis of left knee  -     bupivacaine HCl (MARCAINE) 0.25 % (2.5 mg/mL) injection 12.5 mg; 12.5 mg (5 mL), Other, ONCE, 1 dose, On Wed 1/5/22 at 1100  -     triamcinolone acetonide (KENALOG-40) 40 mg/mL injection 40 mg; 40 mg, Intra artICUlar, ONCE, 1 dose, On Wed 1/5/22 at 1100  -     REFERRAL TO PHYSICAL THERAPY    The xray and exam findings were discussed with the patient today. Known bilateral knee osteoarthritis with acute exacerbation. She elects to proceed with repeat cortisone injections today. She also agreed to strengthening and up rehabilitation program with physical therapy which will make surgery easier in the future. Diclofenac and diclofenac gel refilled. Follow-up as needed. She understands that we have to wait at least 12 weeks from today to consider scheduling surgery again. Of note, no issues with baby aspirin that she took at home. We should be able to use this for DVT prophylaxis postoperatively in the future. Discussed risks/benefits of cortisone injection and patient gave verbal consent. Under sterile conditions, the knees were each injected with 5cc 0.25% Bupivacaine and 1cc 40mg Triamcinolone Acetonide (Kenalog) intra-articularly, tolerated the procedures well. Return if symptoms worsen or fail to improve. Review Of Systems  ROS     Positive for: Musculoskeletal    Last edited by Jaida Naik RN on 1/5/2022  9:58 AM. (History)         Patient denies any recent fever, chills, nausea, vomiting, chest pain, or shortness of breath. Allergies   Allergen Reactions    Aspirin Palpitations    Milk Prot-Turm-Pepper-Pineappl Diarrhea     CAN EAT ICE CREAM & CHEESE & BUTTER. DRINKS LACTAID    Minocycline Other (comments)     Told by MD she is allergic. She does not know the reaction.        Current Outpatient Medications   Medication Sig    metFORMIN ER (GLUCOPHAGE XR) 500 mg tablet Take 500 mg by mouth daily.  diclofenac (VOLTAREN) 1 % gel Apply  to affected area four (4) times daily.  diclofenac EC (VOLTAREN) 75 mg EC tablet Take 75 mg by mouth two (2) times a day.  aspirin 81 mg chewable tablet Take 81 mg by mouth daily.  diclofenac EC (VOLTAREN) 75 mg EC tablet Take 1 Tablet by mouth two (2) times a day.  diclofenac (VOLTAREN) 1 % gel Apply 2 g to affected area four (4) times daily.  pravastatin (PRAVACHOL) 10 mg tablet Take 10 mg by mouth.  ibuprofen (ADVIL) 200 mg tablet Take 200 mg by mouth. (Patient not taking: Reported on 1/5/2022)    mirabegron ER (MYRBETRIQ) 25 mg ER tablet Take 25 mg by mouth daily.  cholecalciferol (VITAMIN D3) 1,000 unit tablet cholecalciferol (vitamin D3) 1,000 unit (25 mcg) tablet (Patient not taking: Reported on 1/5/2022)     No current facility-administered medications for this visit.        Past Medical History:   Diagnosis Date    Arthritis     BOTH HIPS PAINFUL    Arthritis     Chronic pain     Diabetes ([facility] [address]     Difficulty sleeping     Frequent urination     GERD (gastroesophageal reflux disease)     High cholesterol     Spinal stenosis, lumbar         Past Surgical History:   Procedure Laterality Date    HX HEENT  1980s    VOCAL CORD POLYP EXCISED    HX ORTHOPAEDIC  1990s    FOOT SURGERY    HX ORTHOPAEDIC  May 2012    total hip replacement-RIGHT    HX ORTHOPAEDIC      back surgery    HX TONSILLECTOMY  CHILDHOOD       Family History   Problem Relation Age of Onset    Other Mother         CARDIAC ARREST DURING HIATAL HERNIA/REFLUX SURGERY;  ALSO, DOES NOT EVARISTO IV DYE    Hypertension Mother     Heart Disease Mother     Hypertension Father     Dementia Father     Heart Disease Father     Elevated Lipids Father         Social History     Socioeconomic History    Marital status:      Spouse name: Not on file    Number of children: Not on file    Years of education: Not on file  Highest education level: Not on file   Occupational History    Not on file   Tobacco Use    Smoking status: Never Smoker    Smokeless tobacco: Never Used   Substance and Sexual Activity    Alcohol use: Yes     Comment: RARE ALCOHOL    Drug use: No    Sexual activity: Not Currently   Other Topics Concern    Not on file   Social History Narrative    Not on file     Social Determinants of Health     Financial Resource Strain:     Difficulty of Paying Living Expenses: Not on file   Food Insecurity:     Worried About Running Out of Food in the Last Year: Not on file    Deshawn of Food in the Last Year: Not on file   Transportation Needs:     Lack of Transportation (Medical): Not on file    Lack of Transportation (Non-Medical):  Not on file   Physical Activity:     Days of Exercise per Week: Not on file    Minutes of Exercise per Session: Not on file   Stress:     Feeling of Stress : Not on file   Social Connections:     Frequency of Communication with Friends and Family: Not on file    Frequency of Social Gatherings with Friends and Family: Not on file    Attends Sabianism Services: Not on file    Active Member of [address] [place] [address] or Organizations: Not on file    Attends Club or Organization Meetings: Not on file    Marital Status: Not on file   Intimate Partner Violence:     Fear of Current or Ex-Partner: Not on file    Emotionally Abused: Not on file    Physically Abused: Not on file    Sexually Abused: Not on file   Housing Stability:     Unable to Pay for Housing in the Last Year: Not on file    Number of [place] in the Last Year: Not on file    Unstable Housing in the Last Year: Not on file       Orders Placed This Encounter    REFERRAL TO PHYSICAL THERAPY     Referral Priority:   Routine     Referral Type:   PT/OT/ST     Referral Reason:   Specialty Services Required     Number of Visits Requested:   1    bupivacaine HCl (MARCAINE) 0.25 % (2.5 mg/mL) injection 12.5 mg    triamcinolone acetonide (KENALOG-40) 40 mg/mL injection 40 mg    bupivacaine HCl (MARCAINE) 0.25 % (2.5 mg/mL) injection 12.5 mg    triamcinolone acetonide (KENALOG-40) 40 mg/mL injection 40 mg    diclofenac EC (VOLTAREN) 75 mg EC tablet     Sig: Take 1 Tablet by mouth two (2) times a day. Dispense:  60 Tablet     Refill:  1    diclofenac (VOLTAREN) 1 % gel     Sig: Apply 2 g to affected area four (4) times daily. Dispense:  1 Each     Refill:  2      Fer Scott M.D. was available for immediate consultation as the supervising physician. An electronic signature was used to authenticate this note.   -- Santana Trimble PA-C

## 2022-01-19 ENCOUNTER — OFFICE VISIT (OUTPATIENT)
Dept: ORTHOPEDIC SURGERY | Age: 67
End: 2022-01-19
Payer: MEDICARE

## 2022-01-19 DIAGNOSIS — M17.12 PRIMARY OSTEOARTHRITIS OF LEFT KNEE: ICD-10-CM

## 2022-01-19 DIAGNOSIS — M17.11 PRIMARY OSTEOARTHRITIS OF RIGHT KNEE: Primary | ICD-10-CM

## 2022-01-19 PROCEDURE — 97110 THERAPEUTIC EXERCISES: CPT | Performed by: PHYSICAL THERAPIST

## 2022-01-19 PROCEDURE — 97162 PT EVAL MOD COMPLEX 30 MIN: CPT | Performed by: PHYSICAL THERAPIST

## 2022-01-19 NOTE — PROGRESS NOTES
Seema Mackey (: 1955) is a 74 y.o. female patient here for evaluation of the following chief complaint(s):  Knee Pain       Patient Name: Ranjeet Mcfadden  Date:2022  : 1955  [x]  Patient  Verified  Payor: VA MEDICARE / Plan: VA MEDICARE PART A & B / Product Type: Medicare /    In time: 1:00  Out time: 1:45  Total Treatment Time (min): 45  Total Timed Codes (min): 45  1:1 Treatment Time ([facility] only): 45   Visit #:       SUBJECTIVE/OBJECTIVE:  HPI    Seema Mackey presents today complaining of bilateral knee pain, right worse than left. She was scheduled for a left total knee arthroplasty on 2022 which she canceled because she was not hurting and actually moving around well. She decided to go for a walk because she was able to lose 30 pounds in the past from walking for exercise. She walked 2.5 miles at [facility] and had acute exacerbation of pain afterward. Subjective instability during the walk toward the end, right knee pain worse than left. She has had to use a walker since then but typically does. Pain is diffuse, sometimes medial, sometimes posterior. Diclofenac and Voltaren gel do not seem to help. She reports increased difficulty when walking up and down stairs, also has medial knee pain when transitioning from sitting to standing. She is having pain at night when sleeping. She would like to build on her strength to allow for full functional return to daily activities and improve her endurance. She does have a history of right THR 10 years ago.     Physical Exam    Knee Exam    Range of motion:   Flexion-115 on the right, 112 on the left   Extension-  0° bilaterally     Strength:   Knee ext- 5/5 on the left, 5/5 on the right  Knee flex- 5/5 in the left, 5/5 on the right  Hip flex/ext- 4/5 bilaterally    Flexibility:   Hamstrings- moderate deficit bilaterally  Hip flexors- moderate deficit bilaterally  Quadriceps-moderate deficit bilaterally  Gastroc-soleus- moderate deficit bilaterally    Soft tissue: Moderate tenderness to palpation along medial joint line bilaterally    Girth:  None noted    Pain: Reported a visual analog scale:  0-4/10, particularly with walking without the walker, squatting, stairs, going from sitting to standing    Joint mobility assessment: within functional limits passive range of motion into tibiofemoral joint flexion, extension and patella mobility. Squatting: medial joint pain    Gait: patient is currently using a rolling walker for ambulation, mild antalgic gait    Lower Extremity Functional Scale  9\80    Special tests:  Patellofemoral grind- positive on the right and left    HEP consisted of the bike for range of motion and strength, straight leg raises, calf stretching, stretching into knee flexion, hamstring stretching and banded hip flexion      An electronic signature was used to authenticate this note. -- Jack Girard PT       ASSESSMENT/PLAN:  Below is the assessment and plan developed based on review of pertinent history, physical exam, labs, studies, and medications. 1. Primary osteoarthritis of right knee  2. Primary osteoarthritis of left knee      Return in about 1 week (around 1/26/2022) for left and right knee. Seema Grullon Husbands is a 74 y.o. F presenting with bilateral knee pain, onset months ago. She currently has the following physical therapy impairments: Increased pain, loss of quadriceps flexibility, bilaterally, slight loss of knee extension and flexion range of motion bilaterally, pain/difficulty with going up and down stairs, going from sitting to standing; squatting, kneeling, prolonged extension of the knee; also complains of loss of strength with daily activity and ambulation. She would benefit from skilled physical therapy services in order to address the above impairments to allow for full functional return and return to recreation.   Reviewed a home program with the patient, focus will be given to addressing quadriceps flexibility deficits, improving knee joint range of motion on the right, and working towards squatting, proprioception, and walking without the rolling walker as appropriate. The patient appeared to understand the plan of care and was in agreement on her home program.  She will attend physical therapy for 1-2 visits a week, for 1 month in order to address the above impairments. Goals to be met in 4-6 weeks-  1. The patient will demonstrate the ability to perform 10 double leg squats without increased medial knee pain in order to allow performance of daily/functional activity  2. The patient will be independent with a HEP  3. The patient will be able to sleep through the night without pain  4. The patient will  report no incidence of knee buckling with daily and recreational activity  5. The patient will achieve 120 degrees flexion bilaterally        An electronic signature was used to authenticate this note.   -- Emilia Marino, PT

## 2022-01-26 ENCOUNTER — OFFICE VISIT (OUTPATIENT)
Dept: ORTHOPEDIC SURGERY | Age: 67
End: 2022-01-26
Payer: MEDICARE

## 2022-01-26 DIAGNOSIS — M17.11 PRIMARY OSTEOARTHRITIS OF RIGHT KNEE: Primary | ICD-10-CM

## 2022-01-26 DIAGNOSIS — M17.12 PRIMARY OSTEOARTHRITIS OF LEFT KNEE: ICD-10-CM

## 2022-01-26 PROCEDURE — 97110 THERAPEUTIC EXERCISES: CPT | Performed by: PHYSICAL THERAPIST

## 2022-01-26 PROCEDURE — 97140 MANUAL THERAPY 1/> REGIONS: CPT | Performed by: PHYSICAL THERAPIST

## 2022-01-26 NOTE — PROGRESS NOTES
Seema Mackey (: 1955) is a 74 y.o. female patient here for evaluation of the following chief complaint(s):  Knee Pain       Patient Name: Alexander Garcia  Date:2022  : 1955  [x]  Patient  Verified  Payor: VA MEDICARE / Plan: VA MEDICARE PART A & B / Product Type: Medicare /    In time: 8:20  Out time: 9:20  Total Treatment Time (min): 60  Total Timed Codes (min): 45  1:1 Treatment Time ( [address] only): 39   Visit #:       ASSESSMENT/PLAN:  Below is the assessment and plan developed based on review of pertinent history, physical exam, labs, studies, and medications. 1. Primary osteoarthritis of right knee  2. Primary osteoarthritis of left knee      Return in about 2 days (around 2022) for continued therapy for knee. The patient is making slow improvement in her symptoms. She is somewhat limited in weightbearing strengthening exercise, therefore we are focusing on open chain strengthening in order to progress towards close chain in the next few weeks. She has not been using her walker for ambulation, and improvement from last week. Instructed on an updated program and provided written instructions, the patient will follow up next week. SUBJECTIVE/OBJECTIVE:  HPI    Patient reports she thinks the exercises are helping her symptoms. She has not needed to use the walker. Physical Exam    Objective:    ROM: will measure next visit-approximately 115 degrees bilaterally    Function/Strength: Patient is able to stand on one leg without anterior/medial knee pain, perform mini squats without knee pain    Manual performed: right and left knee patella inferior glides    Therapeutic exercise performed: Single leg stance, the leg press, long arc quads, the bike, kick outs, mini squats, calf and hamstring stretching, band walks and straight leg raises. An electronic signature was used to authenticate this note.   -- Kellen Hurst, PT

## 2022-01-28 ENCOUNTER — OFFICE VISIT (OUTPATIENT)
Dept: ORTHOPEDIC SURGERY | Age: 67
End: 2022-01-28
Payer: MEDICARE

## 2022-01-28 ENCOUNTER — OFFICE VISIT (OUTPATIENT)
Dept: ORTHOPEDIC SURGERY | Age: 67
End: 2022-01-28

## 2022-01-28 VITALS — BODY MASS INDEX: 39.61 KG/M2 | WEIGHT: 232 LBS | HEIGHT: 64 IN

## 2022-01-28 DIAGNOSIS — M17.12 PRIMARY OSTEOARTHRITIS OF LEFT KNEE: ICD-10-CM

## 2022-01-28 DIAGNOSIS — M17.11 PRIMARY OSTEOARTHRITIS OF RIGHT KNEE: Primary | ICD-10-CM

## 2022-01-28 PROCEDURE — 97140 MANUAL THERAPY 1/> REGIONS: CPT | Performed by: PHYSICAL THERAPIST

## 2022-01-28 PROCEDURE — G8536 NO DOC ELDER MAL SCRN: HCPCS | Performed by: PHYSICIAN ASSISTANT

## 2022-01-28 PROCEDURE — 3017F COLORECTAL CA SCREEN DOC REV: CPT | Performed by: PHYSICIAN ASSISTANT

## 2022-01-28 PROCEDURE — G8427 DOCREV CUR MEDS BY ELIG CLIN: HCPCS | Performed by: PHYSICIAN ASSISTANT

## 2022-01-28 PROCEDURE — G8399 PT W/DXA RESULTS DOCUMENT: HCPCS | Performed by: PHYSICIAN ASSISTANT

## 2022-01-28 PROCEDURE — 1090F PRES/ABSN URINE INCON ASSESS: CPT | Performed by: PHYSICIAN ASSISTANT

## 2022-01-28 PROCEDURE — 1101F PT FALLS ASSESS-DOCD LE1/YR: CPT | Performed by: PHYSICIAN ASSISTANT

## 2022-01-28 PROCEDURE — G8432 DEP SCR NOT DOC, RNG: HCPCS | Performed by: PHYSICIAN ASSISTANT

## 2022-01-28 PROCEDURE — 97110 THERAPEUTIC EXERCISES: CPT | Performed by: PHYSICAL THERAPIST

## 2022-01-28 PROCEDURE — 99213 OFFICE O/P EST LOW 20 MIN: CPT | Performed by: PHYSICIAN ASSISTANT

## 2022-01-28 PROCEDURE — G8417 CALC BMI ABV UP PARAM F/U: HCPCS | Performed by: PHYSICIAN ASSISTANT

## 2022-01-28 NOTE — PROGRESS NOTES
Seema Mackey (: 1955) is a 74 y.o. female patient here for evaluation of the following chief complaint(s):  Knee Pain       Patient Name: Elisa Stewart  Date:2022  : 1955  [x]  Patient  Verified  Payor: VA MEDICARE / Plan: VA MEDICARE PART A & B / Product Type: Medicare /    In time: 1:00  Out time: 2:00  Total Treatment Time (min): 60  Total Timed Codes (min): 35  1:1 Treatment Time ( [address] only): 35   Visit #: 3 of 25      ASSESSMENT/PLAN:  Below is the assessment and plan developed based on review of pertinent history, physical exam, labs, studies, and medications. 1. Primary osteoarthritis of right knee  2. Primary osteoarthritis of left knee      Return in about 4 days (around 2022) for continued therapy for knee. The patient is making slow improvement in her symptoms. Her right knee continues to bother her more so than the left side. She is somewhat limited in weightbearing strengthening exercise, therefore we are focusing on open chain strengthening in order to progress towards close chain in the next few weeks, as tolerated. She has not been using her walker for ambulation. We will continue with therapy for another few weeks to develop a home program prior to her right knee replacement at the end of March. SUBJECTIVE/OBJECTIVE:  HPI    Patient reports she thinks the exercises are helping her symptoms. Still continues to complain more of right knee pain versus left.     Physical Exam    Objective:    ROM: will measure next visit-approximately 115 degrees bilaterally    Function/Strength: Patient is able to stand on one leg without anterior/medial knee pain, perform mini squats without knee pain    Manual performed: right and left knee patella inferior glides, soft tissue massage medial quadriceps and adductors bilaterally    Therapeutic exercise performed: Single leg stance, the leg press, long arc quads, the bike, kick outs, mini squats, calf and hamstring stretching, balance board, band walks and straight leg raises. An electronic signature was used to authenticate this note.   -- Denys Robison, PT

## 2022-01-31 ENCOUNTER — OFFICE VISIT (OUTPATIENT)
Dept: ORTHOPEDIC SURGERY | Age: 67
End: 2022-01-31
Payer: MEDICARE

## 2022-01-31 DIAGNOSIS — M17.11 PRIMARY OSTEOARTHRITIS OF RIGHT KNEE: Primary | ICD-10-CM

## 2022-01-31 DIAGNOSIS — M17.12 PRIMARY OSTEOARTHRITIS OF LEFT KNEE: ICD-10-CM

## 2022-01-31 PROCEDURE — 97110 THERAPEUTIC EXERCISES: CPT | Performed by: PHYSICAL THERAPIST

## 2022-01-31 PROCEDURE — 97140 MANUAL THERAPY 1/> REGIONS: CPT | Performed by: PHYSICAL THERAPIST

## 2022-01-31 NOTE — PROGRESS NOTES
Seema Mackey (: 1955) is a 74 y.o. female patient here for evaluation of the following chief complaint(s):  Knee Pain       Patient Name: John Chamorro  Date:2022  : 1955  [x]  Patient  Verified  Payor: VA MEDICARE / Plan: VA MEDICARE PART A & B / Product Type: Medicare /    In time: 10:55  Out time: 11:40  Total Treatment Time (min): 45  Total Timed Codes (min): 45  1:1 Treatment Time ( [address] only): 40   Visit #:       ASSESSMENT/PLAN:  Below is the assessment and plan developed based on review of pertinent history, physical exam, labs, studies, and medications. 1. Primary osteoarthritis of right knee  2. Primary osteoarthritis of left knee      Return in about 1 week (around 2022) for bilateral knees. The patient is making slow improvement in her symptoms. Her right knee continues to bother her more so than the left side. Patient was able to tolerate limited range squatting with mild knee pain. She is doing well with open chain strengthening. She is using her walker for ambulation towards the end of the day but is doing better with walking in the morning. Encouraged the patient to be as active as she can including going for walks as tolerated. We will continue with therapy for another few weeks to develop a home program prior to her right knee replacement at the end of March. SUBJECTIVE/OBJECTIVE:  Knee Pain    Patient reports she was sore after the last visit.      Physical Exam    Objective:    ROM: will measure next visit-approximately 115 degrees bilaterally    Function/Strength: Patient is able to stand on one leg without anterior/medial knee pain, perform mini squats without knee pain    Manual performed: right and left knee patella inferior glides, soft tissue massage medial quadriceps and adductors bilaterally    Therapeutic exercise performed: Single leg stance, the leg press, long arc quads, the bike, kick outs, mini squats, calf and hamstring stretching, balance board, band walks. An electronic signature was used to authenticate this note.   -- Rajendra , PT

## 2022-02-06 NOTE — PROGRESS NOTES
Seema Mackey (: 1955) is a 74 y.o. female, patient, here for evaluation of the following chief complaint(s):  Knee Pain       SUBJECTIVE/OBJECTIVE:  Seema Mackey presents again today complaining of bilateral knee pain. She was seen at the beginning of the month for bilateral knee cortisone injections. Formal PT was recommended, in addition to weight loss. Right knee pain now worse than left. She was hoping to be able to walk for exercise but continues to be limited with her daily activities due to worsening knee pain. Using a walker for long distance ambulation. Inquiring about \"[employer]\" commercials. PHYSICAL EXAM:  Vitals: Ht 5' 4\" (1.626 m)   Wt 232 lb (105.2 kg)   BMI 39.82 kg/m²   Body mass index is 39.82 kg/m². 74y.o. year old F in no acute distress. Ambulates with a limp, walker for assistance. Bilateral knee skin warm, dry. Diffuse joint line tenderness to palpation. No effusion. ROM 0-105 bilaterally. Motor 5/5. No distal edema. IMAGING:  Radiographs: Prior xrays reviewed which reveal left knee with complete loss of medial joint space, moderate to severe loss of medial joint space right knee. Moderate patellofemoral osteoarthritis bilaterally. ASSESSMENT/PLAN:  1. Primary osteoarthritis of right knee  2. Primary osteoarthritis of left knee    The xray and exam findings were discussed with the patient today. Worsening bilateral knee OA. Based on last injections, timing of surgical intervention discussed. Without much relief from cortisone, I don't think HA injections would make a meaningful difference. Recommended weightloss as she is right on the cusp of BMI being too high. Follow up to discuss surgical options once she gets closer to eligibility. Continue diclofenac PO / topical as needed. Need updated xrays and official weight at next visit. Return in about 4 weeks (around 2022).     Review Of Systems  ROS     Positive for: Musculoskeletal    Last edited by Beulah Vera RN on 1/28/2022 10:16 AM. (History)         Patient denies any recent fever, chills, nausea, vomiting, chest pain, or shortness of breath. Allergies   Allergen Reactions    Aspirin Palpitations    Milk Prot-Turm-Pepper-Pineappl Diarrhea     CAN EAT ICE CREAM & CHEESE & BUTTER. DRINKS LACTAID    Minocycline Other (comments)     Told by MD she is allergic. She does not know the reaction. Current Outpatient Medications   Medication Sig    metFORMIN ER (GLUCOPHAGE XR) 500 mg tablet Take 500 mg by mouth daily.  diclofenac (VOLTAREN) 1 % gel Apply  to affected area four (4) times daily.  diclofenac EC (VOLTAREN) 75 mg EC tablet Take 75 mg by mouth two (2) times a day.  aspirin 81 mg chewable tablet Take 81 mg by mouth daily.  diclofenac EC (VOLTAREN) 75 mg EC tablet Take 1 Tablet by mouth two (2) times a day.  diclofenac (VOLTAREN) 1 % gel Apply 2 g to affected area four (4) times daily.  ibuprofen (ADVIL) 200 mg tablet Take 200 mg by mouth. (Patient not taking: Reported on 1/5/2022)    mirabegron ER (MYRBETRIQ) 25 mg ER tablet Take 25 mg by mouth daily.  cholecalciferol (VITAMIN D3) 1,000 unit tablet cholecalciferol (vitamin D3) 1,000 unit (25 mcg) tablet (Patient not taking: Reported on 1/5/2022)    pravastatin (PRAVACHOL) 10 mg tablet Take 10 mg by mouth. No current facility-administered medications for this visit.        Past Medical History:   Diagnosis Date    Arthritis     BOTH HIPS PAINFUL    Arthritis     Chronic pain     Diabetes ([address]     Difficulty sleeping     Frequent urination     GERD (gastroesophageal reflux disease)     High cholesterol     Spinal stenosis, lumbar         Past Surgical History:   Procedure Laterality Date    HX HEENT  1980s    VOCAL CORD POLYP EXCISED    HX ORTHOPAEDIC  1990s    FOOT SURGERY    HX ORTHOPAEDIC  May 2012    total hip replacement-RIGHT    HX ORTHOPAEDIC      back surgery  HX TONSILLECTOMY  CHILDHOOD       Family History   Problem Relation Age of Onset    Other Mother         CARDIAC ARREST DURING HIATAL HERNIA/REFLUX SURGERY;  ALSO, DOES NOT EVARISTO IV DYE    Hypertension Mother     Heart Disease Mother     Hypertension Father     Dementia Father     Heart Disease Father     Elevated Lipids Father         Social History     Socioeconomic History    Marital status:      Spouse name: Not on file    Number of children: Not on file    Years of education: Not on file    Highest education level: Not on file   Occupational History    Not on file   Tobacco Use    Smoking status: Never Smoker    Smokeless tobacco: Never Used   Substance and Sexual Activity    Alcohol use: Yes     Comment: RARE ALCOHOL    Drug use: No    Sexual activity: Not Currently   Other Topics Concern    Not on file   Social History Narrative    Not on file     Social Determinants of Health     Financial Resource Strain:     Difficulty of Paying Living Expenses: Not on file   Food Insecurity:     Worried About Running Out of Food in the Last Year: Not on file    [place] of Food in the Last Year: Not on file   Transportation Needs:     Lack of Transportation (Medical): Not on file    Lack of Transportation (Non-Medical):  Not on file   Physical Activity:     Days of Exercise per Week: Not on file    Minutes of Exercise per Session: Not on file   Stress:     Feeling of Stress : Not on file   Social Connections:     Frequency of Communication with Friends and Family: Not on file    Frequency of Social Gatherings with Friends and Family: Not on file    Attends Sabianism Services: Not on file    Active Member of Clubs or Organizations: Not on file    Attends Club or Organization Meetings: Not on file    Marital Status: Not on file   Intimate Partner Violence:     Fear of Current or Ex-Partner: Not on file    Emotionally Abused: Not on file    Physically Abused: Not on file   [facility] Sexually Abused: Not on file   Housing Stability:     Unable to Pay for Housing in the Last Year: Not on file    Number of Places Lived in the Last Year: Not on file    Unstable Housing in the Last Year: Not on file       No orders of the defined types were placed in this encounter. Obie Weaver. Jael Angel M.D. was available for immediate consultation as the supervising physician. An electronic signature was used to authenticate this note.   -- Leighann Cleveland PA-C

## 2022-02-07 DIAGNOSIS — M17.11 PRIMARY OSTEOARTHRITIS OF RIGHT KNEE: Primary | ICD-10-CM

## 2022-02-18 ENCOUNTER — OFFICE VISIT (OUTPATIENT)
Dept: ORTHOPEDIC SURGERY | Age: 67
End: 2022-02-18
Payer: MEDICARE

## 2022-02-18 DIAGNOSIS — M17.11 PRIMARY OSTEOARTHRITIS OF RIGHT KNEE: Primary | ICD-10-CM

## 2022-02-18 DIAGNOSIS — M17.12 PRIMARY OSTEOARTHRITIS OF LEFT KNEE: ICD-10-CM

## 2022-02-18 PROCEDURE — 97110 THERAPEUTIC EXERCISES: CPT | Performed by: PHYSICAL THERAPIST

## 2022-02-18 NOTE — PROGRESS NOTES
Seema Mackey (: 1955) is a 74 y.o. female patient here for evaluation of the following chief complaint(s):  Knee Pain       Patient Name: Mey Floyd  Date:2022  : 1955  [x]  Patient  Verified  Payor: Bertin Bragg / Plan: [place] MEDICARE PART A & B / Product Type: Medicare /    In time: 8:00  Out time: 9:00  Total Treatment Time (min): 60  Total Timed Codes (min): 45  1:1 Treatment Time ( [address] only): 40   Visit #:       ASSESSMENT/PLAN:  Below is the assessment and plan developed based on review of pertinent history, physical exam, labs, studies, and medications. 1. Primary osteoarthritis of right knee  2. Primary osteoarthritis of left knee      Return in about 1 week (around 2022) for continued therapy for knee. The patient is making slow improvement in her symptoms. Her right knee continues to bother her more so than the left side. Patient was able to tolerate limited range squatting with mild knee pain. She we will follow up in the clinic for 1-2 more visits, will hold off on therapy at that time until she has her knee replacement. I encouraged the patient to be as active as she can including going for walks as tolerated, even through some of the discomfort. SUBJECTIVE/OBJECTIVE:  Knee Pain    Patient reports she has noticed improved mobility when walking downstairs and putting on her socks/shoes. The is painful in the inside with walking, however. She has not been able to go for longer walks because of this.     Physical Exam    Objective:    ROM: will measure next visit-approximately 115 degrees bilaterally    Function/Strength: Patient is able to stand on one leg without anterior/medial knee pain, perform mini squats without knee pain    Manual performed: right and left knee patella inferior glides, soft tissue massage medial quadriceps and adductors bilaterally    Therapeutic exercise performed: Single leg stance, the leg press, long arc quads, the bike, kick outs, mini squats with band, calf and hamstring stretching, balance board, band walks, hip flexor stretch off table. Finished with ice to the right knee for 10 minutes. An electronic signature was used to authenticate this note.   -- Heath Mata, PT

## 2022-02-28 ENCOUNTER — OFFICE VISIT (OUTPATIENT)
Dept: ORTHOPEDIC SURGERY | Age: 67
End: 2022-02-28
Payer: MEDICARE

## 2022-02-28 VITALS — BODY MASS INDEX: 40.15 KG/M2 | HEIGHT: 64 IN | WEIGHT: 235.2 LBS

## 2022-02-28 DIAGNOSIS — Z96.641 STATUS POST RIGHT HIP REPLACEMENT: Primary | ICD-10-CM

## 2022-02-28 DIAGNOSIS — E66.01 OBESITY, CLASS III, BMI 40-49.9 (MORBID OBESITY) (HCC): ICD-10-CM

## 2022-02-28 DIAGNOSIS — M17.11 PRIMARY OSTEOARTHRITIS OF RIGHT KNEE: ICD-10-CM

## 2022-02-28 DIAGNOSIS — M76.891 TENDINITIS INVOLVING RIGHT HIP ABDUCTORS: ICD-10-CM

## 2022-02-28 PROCEDURE — G8417 CALC BMI ABV UP PARAM F/U: HCPCS | Performed by: ORTHOPAEDIC SURGERY

## 2022-02-28 PROCEDURE — 99214 OFFICE O/P EST MOD 30 MIN: CPT | Performed by: ORTHOPAEDIC SURGERY

## 2022-02-28 PROCEDURE — G8432 DEP SCR NOT DOC, RNG: HCPCS | Performed by: ORTHOPAEDIC SURGERY

## 2022-02-28 PROCEDURE — G8536 NO DOC ELDER MAL SCRN: HCPCS | Performed by: ORTHOPAEDIC SURGERY

## 2022-02-28 PROCEDURE — G8427 DOCREV CUR MEDS BY ELIG CLIN: HCPCS | Performed by: ORTHOPAEDIC SURGERY

## 2022-02-28 PROCEDURE — 1101F PT FALLS ASSESS-DOCD LE1/YR: CPT | Performed by: ORTHOPAEDIC SURGERY

## 2022-02-28 PROCEDURE — 3017F COLORECTAL CA SCREEN DOC REV: CPT | Performed by: ORTHOPAEDIC SURGERY

## 2022-02-28 PROCEDURE — 1090F PRES/ABSN URINE INCON ASSESS: CPT | Performed by: ORTHOPAEDIC SURGERY

## 2022-02-28 PROCEDURE — G8399 PT W/DXA RESULTS DOCUMENT: HCPCS | Performed by: ORTHOPAEDIC SURGERY

## 2022-02-28 RX ORDER — METHYLPREDNISOLONE 4 MG/1
TABLET ORAL
Qty: 1 DOSE PACK | Refills: 0 | Status: SHIPPED | OUTPATIENT
Start: 2022-02-28 | End: 2022-03-24

## 2022-02-28 NOTE — LETTER
2/28/2022    Patient: Yesenia Mckeon   YOB: 1955   Date of Visit: 2/28/2022     Janis Vieira MD  [facility] [address] [address]  Via Fax: [phone]    Dear Janis Vieira MD,      Thank you for referring Ms. Seema Mackey to [facility] for evaluation. My notes for this consultation are attached. If you have questions, please do not hesitate to call me. I look forward to following your patient along with you.       Sincerely,    Carley Kuhn MD

## 2022-02-28 NOTE — PROGRESS NOTES
Seema Mackey (: 1955) is a 74 y.o. female, patient, here for evaluation of the following chief complaint(s):  Knee Pain (R knee) and Hip Pain (RTH )       SUBJECTIVE/OBJECTIVE:  Seema Mackey presents today complaining of severe right knee pain. Known osteoarthritis. Progressive symptoms over the last few years. She is having aching at rest, start up pain, pain that limits all of her activities. She has been on and off of assistive devices. Cannot navigate stairs. Occasional wakening night pain. Mechanical symptoms without falls. She has had multiple corticosteroid injections. Takes prescription nonsteroidal anti-inflammatories with no improvement. She has tried physical therapy. Recently she has been having some trochanteric pain in the right hip. I replaced her right hip in . Hurts when she lays on her right side. Pain with activities. PHYSICAL EXAM:  Vitals: Ht 5' 4\" (1.626 m)   Wt 235 lb 3.2 oz (106.7 kg)   BMI 40.37 kg/m²   Body mass index is 40.37 kg/m². 74y.o. year old F, obese, no distress. Antalgic gait on the right. Right lateral hip scar well-healed. Point tender over trochanteric region. Minimal discomfort at extremes of hip motion. Negative Stinchfield. Right knee neutral alignment. No warmth or effusion. Diffuse joint line tenderness. Crepitus with motion, 0 to approximately 120 degrees. No instability. Symmetrical palpable distal pulses. No gross motor or sensory deficits in lower extremities. No distal edema. IMAGING:  Radiographs: XR Results (maximum last 2): Results from Appointment encounter on 22    XR KNEE RT MIN 4 V    Narrative  4 x-ray views right knee including AP and PA flexion, lateral, sunrise demonstrate complete loss of medial joint space on weightbearing PA flexion view. Marginal osteophytes in all 3 compartments. Moderate loss patellofemoral space.       XR HIP RT W OR WO PELV 2-3 VWS    Narrative  3 x-ray views right hip including AP pelvis, AP and frog lateral images demonstrate satisfactory position alignment of cementless right total hip components with signs of ingrowth present. No wear or lysis. Hardware present from previous L4-L5 fusion. ASSESSMENT/PLAN:  1. Status post right hip replacement  -     XR HIP RT W OR WO PELV 2-3 VWS; Future  2. Primary osteoarthritis of right knee  -     XR KNEE RT MIN 4 V; Future  3. Tendinitis involving right hip abductors  -     methylPREDNISolone (MEDROL DOSEPACK) 4 mg tablet; Per dose pack instructions, Normal, Disp-1 Dose Pack, R-0  4. Obesity, Class III, BMI 40-49.9 (morbid obesity) ([facility] [address]    The xray and exam findings were discussed with the patient today. Right hip abductor tendinopathy adjacent to total hip. I think this is secondary to altered gait mechanics from right knee osteoarthritis, which is severe. We discussed continued conservative treatment measures versus right total knee replacement. Symptoms have progressed despite comprehensive conservative treatment measures outlined above and patient desires to proceed with right total knee replacement. We discussed risks, benefits, and alternatives in detail, as well as anticipated hospital stay and course of rehabilitation. The patient will see their primary care physician prior to surgery. All questions answered. Plan use IV tranexamic acid intraoperatively, aspirin for DVT prophylaxis. Medrol Dosepak today to temporize right hip symptoms. She understands the trochanteric symptoms could be a chronic waxing and waning problem. Return for surgery. Review Of Systems  ROS     Positive for: Musculoskeletal    Last edited by Mariusz Fairchild RN on 2/28/2022 11:34 AM. (History)         Patient denies any recent fever, chills, nausea, vomiting, chest pain, or shortness of breath.     Allergies   Allergen Reactions    Aspirin Palpitations    Milk Prot-Turm-Pepper-Pineappl Diarrhea CAN EAT ICE CREAM & CHEESE & BUTTER. DRINKS LACTAID    Minocycline Other (comments)     Told by MD she is allergic. She does not know the reaction. Current Outpatient Medications   Medication Sig    methylPREDNISolone (MEDROL DOSEPACK) 4 mg tablet Per dose pack instructions    metFORMIN ER (GLUCOPHAGE XR) 500 mg tablet Take 500 mg by mouth daily.  diclofenac (VOLTAREN) 1 % gel Apply  to affected area four (4) times daily.  diclofenac EC (VOLTAREN) 75 mg EC tablet Take 75 mg by mouth two (2) times a day.  aspirin 81 mg chewable tablet Take 81 mg by mouth daily.  diclofenac EC (VOLTAREN) 75 mg EC tablet Take 1 Tablet by mouth two (2) times a day.  diclofenac (VOLTAREN) 1 % gel Apply 2 g to affected area four (4) times daily.  ibuprofen (ADVIL) 200 mg tablet Take 200 mg by mouth. (Patient not taking: Reported on 1/5/2022)    mirabegron ER (MYRBETRIQ) 25 mg ER tablet Take 25 mg by mouth daily.  cholecalciferol (VITAMIN D3) 1,000 unit tablet cholecalciferol (vitamin D3) 1,000 unit (25 mcg) tablet (Patient not taking: Reported on 1/5/2022)    pravastatin (PRAVACHOL) 10 mg tablet Take 10 mg by mouth. No current facility-administered medications for this visit.        Past Medical History:   Diagnosis Date    Arthritis     BOTH HIPS PAINFUL    Arthritis     Chronic pain     Diabetes ([address]     Difficulty sleeping     Frequent urination     GERD (gastroesophageal reflux disease)     High cholesterol     Spinal stenosis, lumbar         Past Surgical History:   Procedure Laterality Date    HX HEENT  1980s    VOCAL CORD POLYP EXCISED    HX ORTHOPAEDIC  1990s    FOOT SURGERY    HX ORTHOPAEDIC  May 2012    total hip replacement-RIGHT    HX ORTHOPAEDIC      back surgery    HX TONSILLECTOMY  CHILDHOOD       Family History   Problem Relation Age of Onset    Other Mother         CARDIAC ARREST DURING HIATAL HERNIA/REFLUX SURGERY;  ALSO, DOES NOT EVARISTO IV DYE    Hypertension Mother    Aidee Valentina Heart Disease Mother     Hypertension Father     Dementia Father     Heart Disease Father     Elevated Lipids Father         Social History     Socioeconomic History    Marital status:      Spouse name: Not on file    Number of children: Not on file    Years of education: Not on file    Highest education level: Not on file   Occupational History    Not on file   Tobacco Use    Smoking status: Never Smoker    Smokeless tobacco: Never Used   Substance and Sexual Activity    Alcohol use: Yes     Comment: RARE ALCOHOL    Drug use: No    Sexual activity: Not Currently   Other Topics Concern    Not on file   Social History Narrative    Not on file     Social Determinants of Health     Financial Resource Strain:     Difficulty of Paying Living Expenses: Not on file   Food Insecurity:     Worried About Running Out of Food in the Last Year: Not on file    [place]wn of Food in the Last Year: Not on file   Transportation Needs:     Lack of Transportation (Medical): Not on file    Lack of Transportation (Non-Medical):  Not on file   Physical Activity:     Days of Exercise per Week: Not on file    Minutes of Exercise per Session: Not on file   Stress:     Feeling of Stress : Not on file   Social Connections:     Frequency of Communication with Friends and Family: Not on file    Frequency of Social Gatherings with Friends and Family: Not on file    Attends [employer] Services: Not on file    Active Member of [address] [place] [address] or Organizations: Not on file    Attends Club or Organization Meetings: Not on file    Marital Status: Not on file   Intimate Partner Violence:     Fear of Current or Ex-Partner: Not on file    Emotionally Abused: Not on file    Physically Abused: Not on file    Sexually Abused: Not on file   Housing Stability:     Unable to Pay for Housing in the Last Year: Not on file    Number of [place] in the Last Year: Not on file    Unstable Housing in the Last Year: Not on file Orders Placed This Encounter    XR HIP RT W OR WO PELV 2-3 VWS     Standing Status:   Future     Number of Occurrences:   1     Standing Expiration Date:   3/1/2023    XR KNEE RT MIN 4 V     Patient needs to step on scale after xray. Standing Status:   Future     Number of Occurrences:   1     Standing Expiration Date:   3/1/2023    methylPREDNISolone (MEDROL DOSEPACK) 4 mg tablet     Sig: Per dose pack instructions     Dispense:  1 Dose Pack     Refill:  0        An electronic signature was used to authenticate this note.   -- Brittni Kirby MD

## 2022-03-02 ENCOUNTER — OFFICE VISIT (OUTPATIENT)
Dept: ORTHOPEDIC SURGERY | Age: 67
End: 2022-03-02
Payer: MEDICARE

## 2022-03-02 DIAGNOSIS — M17.11 PRIMARY OSTEOARTHRITIS OF RIGHT KNEE: Primary | ICD-10-CM

## 2022-03-02 DIAGNOSIS — M17.12 PRIMARY OSTEOARTHRITIS OF LEFT KNEE: ICD-10-CM

## 2022-03-02 PROCEDURE — 97110 THERAPEUTIC EXERCISES: CPT | Performed by: PHYSICAL THERAPIST

## 2022-03-02 NOTE — PROGRESS NOTES
Seema Mackey (: 1955) is a 74 y.o. female patient here for evaluation of the following chief complaint(s):  Knee Pain       Patient Name: Mey Floyd  Date:3/2/2022  : 1955  [x]  Patient  Verified  Payor: VA MEDICARE / Plan: VA MEDICARE PART A & B / Product Type: Medicare /    In time: 8:10  Out time: 9:00  Total Treatment Time (min): 50  Total Timed Codes (min): 30  1:1 Treatment Time ([facility] only): 40   Visit #:       ASSESSMENT/PLAN:  Below is the assessment and plan developed based on review of pertinent history, physical exam, labs, studies, and medications. 1. Primary osteoarthritis of right knee  2. Tendinitis involving right hip abductors      Return in about 1 week (around 3/9/2022) for continued therapy for knee. The patient is making slow improvement in her symptoms. Her range is around 115 degrees. Patient was able to tolerate limited range squatting with mild knee pain. She we will follow up in the clinic for 1 more visit, will hold off on therapy at that time until she has her knee replacement. I encouraged the patient to be as active as she can including going for walks as tolerated, even through some of the discomfort. SUBJECTIVE/OBJECTIVE:  Knee Pain    Patient reports recently she has been experiencing more discomfort on the left knee. She has been doing some walking. She notes improvement since the start of therapy. Physical Exam    Objective:    ROM: approximately 115 degrees bilaterally (less on left side)    Function/Strength: Patient is able to stand on one leg without anterior/medial knee pain, perform mini squats without knee pain    Therapeutic exercise performed: Single leg stance, the leg press, the bike, kick outs, mini squats with band, calf and hamstring stretching, balance board, band walks, hip flexor stretch off table. Added soft tissue massage to the gastrocsoleus and anterior shin region bilaterally. 45 minutes exercise.  Finished with ice to the right knee for 10 minutes. An electronic signature was used to authenticate this note.   -- Ant Kennedy, PT

## 2022-03-11 ENCOUNTER — OFFICE VISIT (OUTPATIENT)
Dept: ORTHOPEDIC SURGERY | Age: 67
End: 2022-03-11
Payer: MEDICARE

## 2022-03-11 DIAGNOSIS — M17.11 PRIMARY OSTEOARTHRITIS OF RIGHT KNEE: Primary | ICD-10-CM

## 2022-03-11 DIAGNOSIS — M17.12 PRIMARY OSTEOARTHRITIS OF LEFT KNEE: ICD-10-CM

## 2022-03-11 PROCEDURE — 97110 THERAPEUTIC EXERCISES: CPT | Performed by: PHYSICAL THERAPIST

## 2022-03-11 NOTE — PROGRESS NOTES
Seema Mackey (: 1955) is a 74 y.o. female patient here for evaluation of the following chief complaint(s):  Knee Pain       Patient Name: Lisa Olson  ZSFR:  : 1955  [x]  Patient  Verified  Payor: VA MEDICARE / Plan: VA MEDICARE PART A & B / Product Type: Medicare /    In time: 10:20  Out time: 11:15  Total Treatment Time (min): 55  Total Timed Codes (min): 30  1:1 Treatment Time ([facility] only): 30   Visit #:       ASSESSMENT/PLAN:  Below is the assessment and plan developed based on review of pertinent history, physical exam, labs, studies, and medications. 1. Primary osteoarthritis of right knee  2. Primary osteoarthritis of left knee      Return if symptoms worsen or fail to improve. The patient is making slow improvement in her symptoms. Her range is around 115 degrees. The patient is doing very well with a rehab program including both open and close chain strengthening exercise and stretches for the hamstrings and quadriceps. She has been able to walk 1-1/2 miles few days per week. We will plan to see her after her total knee replacement in about 1 month or so, the patient was in agreement with the plan. SUBJECTIVE/OBJECTIVE:  Knee Pain    Patient reports recently she has been experiencing more discomfort on the left knee. She has been able to walk 1-1/2 miles. Physical Exam    Objective:    ROM: approximately 115 degrees bilaterally (less on left side)    Function/Strength: Patient is able to stand on one leg without anterior/medial knee pain, perform mini squats without knee pain    Therapeutic exercise performed: Single leg stance, the leg press, the bike, kick outs, mini squats with band, calf and hamstring stretching, balance board, band walks, hip flexor stretch off table. Added soft tissue massage to the gastrocsoleus and anterior shin region bilaterally. 45 minutes exercise. An electronic signature was used to authenticate this note.   -- Pita Lemus, PT

## 2022-03-19 PROBLEM — E66.01 OBESITY, MORBID (HCC): Status: ACTIVE | Noted: 2019-11-21

## 2022-03-24 ENCOUNTER — HOSPITAL ENCOUNTER (OUTPATIENT)
Dept: PREADMISSION TESTING | Age: 67
Discharge: HOME | End: 2022-03-24
Payer: MEDICARE

## 2022-03-24 VITALS
SYSTOLIC BLOOD PRESSURE: 150 MMHG | HEIGHT: 64 IN | WEIGHT: 236.11 LBS | TEMPERATURE: 98.6 F | HEART RATE: 58 BPM | BODY MASS INDEX: 40.31 KG/M2 | DIASTOLIC BLOOD PRESSURE: 76 MMHG | OXYGEN SATURATION: 100 %

## 2022-03-24 LAB
ABO + RH BLD: NORMAL
ANION GAP SERPL CALC-SCNC: 6 MMOL/L (ref 5–15)
APPEARANCE UR: CLEAR
ATRIAL RATE: 51 BPM
BACTERIA URNS QL MICRO: NEGATIVE /HPF
BILIRUB UR QL: NEGATIVE
BLOOD GROUP ANTIBODIES SERPL: NORMAL
BUN SERPL-MCNC: 19 MG/DL (ref 6–20)
BUN/CREAT SERPL: 23 (ref 12–20)
CALCIUM SERPL-MCNC: 9.8 MG/DL (ref 8.5–10.1)
CALCULATED P AXIS, ECG09: 49 DEGREES
CALCULATED R AXIS, ECG10: 4 DEGREES
CALCULATED T AXIS, ECG11: 39 DEGREES
CHLORIDE SERPL-SCNC: 108 MMOL/L (ref 97–108)
CO2 SERPL-SCNC: 26 MMOL/L (ref 21–32)
COLOR UR: ABNORMAL
CREAT SERPL-MCNC: 0.82 MG/DL (ref 0.55–1.02)
DIAGNOSIS, 93000: NORMAL
EPITH CASTS URNS QL MICRO: ABNORMAL /LPF
ERYTHROCYTE [DISTWIDTH] IN BLOOD BY AUTOMATED COUNT: 15 % (ref 11.5–14.5)
EST. AVERAGE GLUCOSE BLD GHB EST-MCNC: 108 MG/DL
GLUCOSE SERPL-MCNC: 99 MG/DL (ref 65–100)
GLUCOSE UR STRIP.AUTO-MCNC: NEGATIVE MG/DL
HBA1C MFR BLD: 5.4 % (ref 4–5.6)
HCT VFR BLD AUTO: 38 % (ref 35–47)
HGB BLD-MCNC: 12.4 G/DL (ref 11.5–16)
HGB UR QL STRIP: NEGATIVE
HYALINE CASTS URNS QL MICRO: ABNORMAL /LPF (ref 0–5)
INR PPP: 1 (ref 0.9–1.1)
KETONES UR QL STRIP.AUTO: NEGATIVE MG/DL
LEUKOCYTE ESTERASE UR QL STRIP.AUTO: ABNORMAL
MCH RBC QN AUTO: 31.1 PG (ref 26–34)
MCHC RBC AUTO-ENTMCNC: 32.6 G/DL (ref 30–36.5)
MCV RBC AUTO: 95.2 FL (ref 80–99)
NITRITE UR QL STRIP.AUTO: NEGATIVE
NRBC # BLD: 0 K/UL (ref 0–0.01)
NRBC BLD-RTO: 0 PER 100 WBC
P-R INTERVAL, ECG05: 190 MS
PH UR STRIP: 6.5 [PH] (ref 5–8)
PLATELET # BLD AUTO: 285 K/UL (ref 150–400)
PMV BLD AUTO: 11.3 FL (ref 8.9–12.9)
POTASSIUM SERPL-SCNC: 4.4 MMOL/L (ref 3.5–5.1)
PROT UR STRIP-MCNC: NEGATIVE MG/DL
PROTHROMBIN TIME: 10.2 SEC (ref 9–11.1)
Q-T INTERVAL, ECG07: 434 MS
QRS DURATION, ECG06: 92 MS
QTC CALCULATION (BEZET), ECG08: 400 MS
RBC # BLD AUTO: 3.99 M/UL (ref 3.8–5.2)
RBC #/AREA URNS HPF: ABNORMAL /HPF (ref 0–5)
SODIUM SERPL-SCNC: 140 MMOL/L (ref 136–145)
SP GR UR REFRACTOMETRY: 1.02 (ref 1–1.03)
SPECIMEN EXP DATE BLD: NORMAL
UA: UC IF INDICATED,UAUC: ABNORMAL
UROBILINOGEN UR QL STRIP.AUTO: 0.2 EU/DL (ref 0.2–1)
VENTRICULAR RATE, ECG03: 51 BPM
WBC # BLD AUTO: 6.5 K/UL (ref 3.6–11)
WBC URNS QL MICRO: ABNORMAL /HPF (ref 0–4)

## 2022-03-24 PROCEDURE — 86901 BLOOD TYPING SEROLOGIC RH(D): CPT

## 2022-03-24 PROCEDURE — 83036 HEMOGLOBIN GLYCOSYLATED A1C: CPT

## 2022-03-24 PROCEDURE — 93005 ELECTROCARDIOGRAM TRACING: CPT

## 2022-03-24 PROCEDURE — 85027 COMPLETE CBC AUTOMATED: CPT

## 2022-03-24 PROCEDURE — 81001 URINALYSIS AUTO W/SCOPE: CPT

## 2022-03-24 PROCEDURE — 80048 BASIC METABOLIC PNL TOTAL CA: CPT

## 2022-03-24 PROCEDURE — 85610 PROTHROMBIN TIME: CPT

## 2022-03-24 RX ORDER — PROMETHAZINE HYDROCHLORIDE AND CODEINE PHOSPHATE 6.25; 1 MG/5ML; MG/5ML
5 SOLUTION ORAL
COMMUNITY
End: 2022-04-01

## 2022-03-24 RX ORDER — ALBUTEROL SULFATE 90 UG/1
AEROSOL, METERED RESPIRATORY (INHALATION) AS NEEDED
COMMUNITY
End: 2022-04-22

## 2022-03-24 RX ORDER — FLUTICASONE FUROATE 50 UG/1
POWDER RESPIRATORY (INHALATION)
COMMUNITY
End: 2022-04-22

## 2022-03-24 RX ORDER — ACETAMINOPHEN 500 MG
TABLET ORAL
COMMUNITY
End: 2022-04-01

## 2022-03-24 NOTE — PERIOP NOTES
Preoperative instructions reviewed with patient. Patient given two bottles of CHG soap. Instructions (reviewed/to be reviewed in class) on use of CHG soap. Patient given SSI infection FAQS sheet, as well as a MRSA/MSSA treatment instruction sheet with an explanation to patient that they will be notified if treatment instructions need to be initiated. Patient was given the opportunity to ask questions on the information provided.

## 2022-03-24 NOTE — PERIOP NOTES
[address] [facility] INSTRUCTIONS  ORTHOPAEDIC    Surgery Date:   3/31/22    [facility] staff will call you between 4 PM- 8 PM the day before surgery with your arrival time. If your surgery is on a Monday, we will call you the preceding Friday. Please call [phone] after 8 PM if you did not receive your arrival time. 1. Please report to [facility] Patient Access/Admitting on the 1st floor. Bring your insurance card, photo identification, and any copayment (if applicable). 2. If you are going home the same day of your surgery, you must have a responsible adult to drive you home. You need to have a responsible adult to stay with you the first 24 hours after surgery and you should not drive a car for 24 hours following your surgery. 3. Do NOT eat any solid foods after midnight the night before surgery including candy, mints or gum. You may drink clear liquids from midnight until 1 hour prior to arrival time. You may drink up to 12 ounces at one time every 4 hours. 4. Do NOT drink alcohol or smoke 24 hours before surgery. STOP smoking for 14 days prior as it helps with breathing and healing after surgery. 5. If your arrival time is 3pm or later, you may eat a light breakfast before 8am (toast, bagel-no butter, black coffee, plain tea, fruit juice-no pulp) Please note special instructions, if applicable, below for medications. 6. If you are being admitted to the hospital,please leave personal belongings/luggage in your car until you have an assigned hospital room number. 7. Please wear comfortable clothes. Wear your glasses instead of contacts. We ask that all money, jewelry and valuables be left at home. Wear no make up, particularly mascara, the day of surgery. 8.  All body piercings, rings, and jewelry need to be removed and left at home. Please remove any nail polish or artificial nails from your fingernails. Please wear your hair loose or down.  Please no pony-tails, buns, or any metal hair accessories. If you shower the morning of surgery, please do not apply any lotions or powders afterwards. You may wear deodorant. Do not shave any body area within 24 hours of your surgery. 9. Please follow all instructions to avoid any potential surgical cancellation. 10. Should your physical condition change, (i.e. fever, cold, flu, etc.) please notify your surgeon as soon as possible. 11. It is important to be on time. If a situation occurs where you may be delayed, please call:  [phone] / [phone] on the day of surgery. 12. The Preadmission Testing staff can be reached at [phone]. 13. Special instructions: NONE    Current Outpatient Medications   Medication Sig    acetaminophen (TYLENOL) 500 mg tablet Take  by mouth every six (6) hours as needed for Pain.  fluticasone furoate (Arnuity Ellipta) 50 mcg/actuation dsdv Take  by inhalation.  albuterol (PROVENTIL HFA, VENTOLIN HFA, PROAIR HFA) 90 mcg/actuation inhaler Take  by inhalation as needed for Wheezing.  promethazine-codeine (PHENERGAN with CODEINE) 6.25-10 mg/5 mL syrup Take 5 mL by mouth four (4) times daily as needed for Cough.  metFORMIN ER (GLUCOPHAGE XR) 500 mg tablet Take 1,000 mg by mouth daily.  diclofenac EC (VOLTAREN) 75 mg EC tablet Take 75 mg by mouth two (2) times a day.  diclofenac EC (VOLTAREN) 75 mg EC tablet Take 1 Tablet by mouth two (2) times a day.  diclofenac (VOLTAREN) 1 % gel Apply 2 g to affected area four (4) times daily.  cholecalciferol (VITAMIN D3) 1,000 unit tablet cholecalciferol (vitamin D3) 1,000 unit (25 mcg) tablet    pravastatin (PRAVACHOL) 10 mg tablet Take 10 mg by mouth.  ibuprofen (ADVIL) 200 mg tablet Take 200 mg by mouth. (Patient not taking: Reported on 1/5/2022)     No current facility-administered medications for this encounter.        1. YOU MUST ONLY TAKE THESE MEDICATIONS THE MORNING OF SURGERY WITH A SIP OF WATER: PRAVASTATIN  2. MEDICATIONS TO TAKE THE MORNING OF SURGERY ONLY IF NEEDED: TYLENOL, ARNUITY ELLIPTA, PROAIR,   3. HOLD these prescription medications BEFORE Surgery: METFORMIN (STOP 3/30/22), DICLOFENAC (STOP 3/28/22)  4. Ask your surgeon/prescribing physician about when/if to STOP taking these medications: NONE  5. Stop any non-steroidal anti-inflammatory drugs (i.e. Ibuprofen, Naproxen, Advil, Aleve) 3 days before surgery. You may take Tylenol. STOP all vitamins and herbal supplements 1 week prior to  surgery. 6. If you are currently taking Plavix, Coumadin, or any other blood-thinning/anticoagulant medication contact your prescribing physician for instructions. Preventing Infections Before and After - Your Surgery    IMPORTANT INSTRUCTIONS    You play an important role in your health and preparation for surgery. To reduce the germs on your skin you will need to shower with CHG soap (Chorhexidine gluconate 4%) two times before surgery. CHG soap (Hibiclens, Hex-A-Clens or store brand)   CHG soap will be provided at your Preadmission Testing (PAT) appointment.  If you do not have a PAT appointment before surgery, you may arrange to  CHG soap from our office or purchase CHG soap at a pharmacy, grocery or department store.  You need to purchase TWO 4 ounce bottles to use for your 2 showers. Steps to follow:  1. Wash your hair with your normal shampoo and your body with regular soap and rinse well to remove shampoo and soap from your skin. 2. Wet a clean washcloth and turn off the shower. 3. Put CHG soap on washcloth and apply to your entire body from the neck down. Do not use on your head, face or private parts(genitals). Do not use CHG soap on open sores, wounds or areas of skin irritation. 4. Wash you body gently for 5 minutes. Do not wash your skin too hard. This soap does not create lather. Pay special attention to your underarms and from your belly button to your feet.   5. Turn the shower back on and rinse well to get CHG soap off your body. 6. Pat your skin dry with a clean, dry towel. Do not apply lotions or moisturizer. 7. Put on clean clothes and sleep on fresh bed sheets and do not allow pets to sleep with you. Shower with CHG soap 2 times before your surgery   The evening before your surgery   The morning of your surgery      Tips to help prevent infections after your surgery:  1. Protect your surgical wound from germs:  ? Hand washing is the most important thing you and your caregivers can do to prevent infections. ? Keep your bandage clean and dry! ? Do not touch your surgical wound. 2. Use clean, freshly washed towels and washcloths every time you shower; do not share bath linens with others. 3. Until your surgical wound is healed, wear clothing and sleep on bed linens each day that are clean and freshly washed. 4. Do not allow pets to sleep in your bed with you or touch your surgical wound. 5. Do not smoke - smoking delays wound healing. This may be a good time to stop smoking. 6. If you have diabetes, it is important for you to manage your blood sugar levels properly before your surgery as well as after your surgery. Poorly managed blood sugar levels slow down wound healing and prevent you from healing completely. Prevention of Infection  Testing for Staphylococcus aureus on your skin before surgery    Staphylococcus aureus (staph) is a common bacteria that is found on the body. It normally does not cause infection on healthy skin. Before surgery, you will be tested to see if you have staph by swabbing the inside of your nose. When you have an incision with surgery, the goal is to protect that incision from infection. Removal of the staph bacteria before surgery can decrease the risk of a surgical site infection. If your nose swab is positive for staph you will be called.  Your treatment will include 2 steps:   Prescription for Mupirocin ointment to be used in each nostril twice a day for 5 days.  [employer] Showering with Chlorhexidine (CHG) liquid soap for 5 days prior to surgery. How to use Mupirocin ointment in your nose  1.  the prescription from your pharmacy. You will receive a large tube of ointment which will be big enough for all of your treatments. You will apply this ointment to each nostril 2 times a day for 5 days. 2. Wash your hands with  gel or soap and water for 20 seconds before using ointment. 3. Place a pea-sized amount of ointment on a cotton Q-tip. 4. Apply ointment just inside of each nostril with the Q-tip. Do not push Q-tip or ointment deep inside you nose. 5. Press your nostrils together and massage for a few seconds. 6. Wash your hands with  gel or soap and water after you are finished. 7. Do not get ointment near your eyes. If it gets into your eyes, rinse them with cool water. 8. If you need to use nasal spray, clean the tip of the bottle with alcohol before use and do not use both at the same time. 9. If you are scheduled for COVID testing during the 5 days, do NOT apply morning dose until after the COVID test has been performed. How to use Chlorhexidine (CHG) 4% liquid soap  1. Purchase an 8 ounce bottle of CHG liquid soap (Chlorhexidine 4%, Hibiclens, Hex-A-Clens or store brand) at a pharmacy or grocery store. 2. Wash your hair with your normal shampoo and your body with regular soap and rinse well to remove shampoo and soap from your skin. 3. Wet a clean washcloth and turn off the shower. 4. Put CHG soap on washcloth and apply to your entire body from the neck down. Do not use on your head, face or private parts(genitals). Do not use CHG soap on open sores, wounds or areas of skin irritation. 5. Wash your body gently for 5 minutes. Do not wash your skin too hard. This soap does not create lather. Pay special attention to your underarms and from your belly button to your feet.   6. Turn the shower back on and rinse well to get CHG soap off your body. 7. Pat your skin dry with a clean, dry towel. Do not apply lotions or moisturizer. 8. Put on clean clothes and sleep on fresh bed sheets the night before surgery. Do not allow pets to sleep with you. Eating and Drinking Before Surgery     You may eat a regular dinner at the usual time on the day before your surgery.  Do NOT eat any solid foods after midnight unless your arrival time at the hospital is 3pm or later.  You may drink clear liquids only from 12 midnight until 1 hours prior to your arrival time at the hospital on the day of your surgery. Do NOT drink alcohol.  Clear liquids include:  o Water  o Fruit juices without pulp( i.e. apple juice)  o Carbonated beverages  o Black coffee (no cream/milk)  o Tea (no cream/milk)  o Gatorade   You may drink up to 12-16 ounces at one time every 4 hours between the hours of midnight and 1 hour before your arrival time at the hospital. Example- if your arrival time at the hospital is 6am, you may drink 12-16 ounces of clear liquids no later than 5am.   If your arrival time at the hospital is 3pm or later, you may eat a light breakfast before 8am.   A light breakfast includes:  o Toast or bagel (no butter)  o Black coffee (no cream/milk)  o Tea (no cream/milk)  o Fruit juices without pulp ( i.e. apple juice)  o Do NOT eat meat, eggs, vegetables or fruit   If you have any questions, please contact your surgeon's office. [facility]   Instructions for Pre-Surgery COVID-19 Testing     Across our ministry we have established standard guidelines to ensure the health and safety of our patients, residents and associates as we resume elective services for patients. All patients presenting for surgery are required to have a COVID-19 test result within 96 hours of their scheduled surgery. [facility] is providing this test free of charge to the patient.    Instructions for COVID-19 Testing:     Patients will receive a call from Pre-Admission Testing 4-5 days prior to surgery to schedule a date and time to come to the [address] [place] [address] Drive for their COVID-19 test   Patients are advised to self-quarantine after testing until their scheduled surgery   Once on site, patients will be registered and receive COVID test in their vehicle   If a patient is scheduled for normal Pre Admission Testing 96 hours from date of surgery, the patient will still have their COVID test done at the [address] [place] [address] located at [address] [place] [address] Positive results will be shared with the surgeon and anesthesiologist and may result in cancellation of the elective procedure    Testing Hours and Location:   Address:  [address] [facility] Pre Admission [address] [facility] in the Discharge Lot on [facility] (Map Attached)  [address] [facility], [address]   Hours: Monday- Friday 7a-3p    PAT Phone Number: [phone]            Patient Information Regarding COVID Restrictions    Patients are advised to self-quarantine after COVID testing up to the day of the scheduled procedure. Day of Procedure     Please park in the parking deck or any designated visitor parking lot.  Enter the facility through the Main Entrance of the hospital.   A temperature check and appropriate symptom/exposure screening will be done prior to entry to the facility.  On the day of surgery, please provide the cell phone number of the person who will be waiting for you to the Patient Access representative at the time of registration.  Please wear a mask on the day of your procedure.  We are now allowing one designated visitor per stay. Pediatric patients may have 2 designated visitors. This one person may come in with you on the day of your procedure.  No visitors under the age of 13.  The designated visitor must also wear a mask.    Once your procedure and the immediate recovery period is completed, a nurse in the recovery area will contact your designated visitor to inform them of your room number or to otherwise review other pertinent information regarding your care.  Social distancing practices are to be adhered to in waiting areas and the cafeteria. The patient was contacted in person. She verbalized understanding of all instructions and does not  need reinforcement.

## 2022-03-25 LAB
BACTERIA SPEC CULT: NORMAL
BACTERIA SPEC CULT: NORMAL
SERVICE CMNT-IMP: NORMAL

## 2022-03-25 NOTE — PERIOP NOTES
PAT Nurse Practitioner   Pre-Operative Chart Review/Assessment:-ORTHOPEDIC                Patient Name:  Lisa Olson                                                           Age:   74 y.o.    :  1955     Today's Date:  3/28/2022     Date of PAT:   3/24/2022      Date of Surgery:    3/31/2022      Procedure(s):  Right Total Knee Arthroplasty     Surgeon:   Dr. Maria E Lindquist                       PLAN:      1)  Medical Clearance:  Dr. Andrea Orlando      2)  Cardiac Clearance:  EKG and METs reviewed. No further cardiac evaluation requested. PAT EKG: unchanged from previous tracings, sinus bradycardia. 3)  Diabetic Treatment Consult:  Not indicated. A1c-5.4      4)  Sleep Apnea evaluation:   Not indicated. CHAD Score 3.       5) Treatment for MRSA/Staph Aureus:  Neg      6) Additional Concerns:  GERD, Pre-DM, obesity                Vital Signs:         Vitals:    22 0822   BP: (!) 150/76   Pulse: (!) 58   Temp: 98.6 °F (37 °C)   SpO2: 100%   Weight: 107.1 kg (236 lb 1.8 oz)   Height: 5' 4\" (1.626 m)            ____________________________________________  PAST MEDICAL HISTORY  Past Medical History:   Diagnosis Date    Arthritis     BOTH HIPS PAINFUL    Chronic pain     Diabetes ([address]     PRE    Difficulty sleeping     Frequent urination     GERD (gastroesophageal reflux disease)     High cholesterol     Spinal stenosis, lumbar       ____________________________________________  PAST SURGICAL HISTORY  Past Surgical History:   Procedure Laterality Date    HX HEENT      VOCAL CORD POLYP EXCISED    HX HIP REPLACEMENT Right 2012    HX ORTHOPAEDIC  1990s    FOOT SURGERY    HX ORTHOPAEDIC      back surgery    HX TONSILLECTOMY  CHILDHOOD      ____________________________________________  HOME MEDICATIONS  Current Outpatient Medications   Medication Sig    acetaminophen (TYLENOL) 500 mg tablet Take  by mouth every six (6) hours as needed for Pain.     fluticasone furoate (Arnuity Ellipta) 50 mcg/actuation dsdv Take  by inhalation.  albuterol (PROVENTIL HFA, VENTOLIN HFA, PROAIR HFA) 90 mcg/actuation inhaler Take  by inhalation as needed for Wheezing.  promethazine-codeine (PHENERGAN with CODEINE) 6.25-10 mg/5 mL syrup Take 5 mL by mouth four (4) times daily as needed for Cough.  metFORMIN ER (GLUCOPHAGE XR) 500 mg tablet Take 1,000 mg by mouth daily.  diclofenac EC (VOLTAREN) 75 mg EC tablet Take 75 mg by mouth two (2) times a day.  diclofenac EC (VOLTAREN) 75 mg EC tablet Take 1 Tablet by mouth two (2) times a day.  diclofenac (VOLTAREN) 1 % gel Apply 2 g to affected area four (4) times daily.  cholecalciferol (VITAMIN D3) 1,000 unit tablet cholecalciferol (vitamin D3) 1,000 unit (25 mcg) tablet    pravastatin (PRAVACHOL) 10 mg tablet Take 10 mg by mouth.  ibuprofen (ADVIL) 200 mg tablet Take 200 mg by mouth. (Patient not taking: Reported on 1/5/2022)     No current facility-administered medications for this encounter.      ____________________________________________  ALLERGIES  Allergies   Allergen Reactions    Aspirin Palpitations    Codeine-Guaifenesin Other (comments)     CAUSES HEART PALPITATIONS AT NIGHT    Milk Prot-Turm-Pepper-Pineappl Diarrhea     CAN EAT ICE CREAM & CHEESE & BUTTER. DRINKS LACTAID    Minocycline Other (comments)     Told by MD she is allergic. She does not know the reaction.       ____________________________________________  SOCIAL HISTORY  Social History     Tobacco Use    Smoking status: Never Smoker    Smokeless tobacco: Never Used   Substance Use Topics    Alcohol use: Yes     Comment: RARE ALCOHOL      ____________________________________________   Internal Administration   First Dose COVID-19, Pfizer Purple top, DILUTE for use, 12+ yrs, 30mcg/0.3mL dose  01/30/2021   Second Dose COVID-19, Pfizer Purple top, DILUTE for use, 12+ yrs, 30mcg/0.3mL dose  02/27/2021     Labs:     Hospital Outpatient Visit on 03/24/2022   Component Date Value Ref Range Status    Sodium 03/24/2022 140  136 - 145 mmol/L Final    Potassium 03/24/2022 4.4  3.5 - 5.1 mmol/L Final    Chloride 03/24/2022 108  97 - 108 mmol/L Final    CO2 03/24/2022 26  21 - 32 mmol/L Final    Anion gap 03/24/2022 6  5 - 15 mmol/L Final    Glucose 03/24/2022 99  65 - 100 mg/dL Final    BUN 03/24/2022 19  6 - 20 MG/DL Final    Creatinine 03/24/2022 0.82  0.55 - 1.02 MG/DL Final    BUN/Creatinine ratio 03/24/2022 23* 12 - 20   Final    GFR est AA 03/24/2022 >60  >60 ml/min/1.73m2 Final    GFR est non-AA 03/24/2022 >60  >60 ml/min/1.73m2 Final    Estimated GFR is calculated using the IDMS-traceable Modification of Diet in Renal Disease (MDRD) Study equation, reported for both  Americans (GFRAA) and non- Americans (GFRNA), and normalized to 1.73m2 body surface area. The physician must decide which value applies to the patient.  Calcium 03/24/2022 9.8  8.5 - 10.1 MG/DL Final    WBC 03/24/2022 6.5  3.6 - 11.0 K/uL Final    RBC 03/24/2022 3.99  3.80 - 5.20 M/uL Final    HGB 03/24/2022 12.4  11.5 - 16.0 g/dL Final    HCT 03/24/2022 38.0  35.0 - 47.0 % Final    MCV 03/24/2022 95.2  80.0 - 99.0 FL Final    MCH 03/24/2022 31.1  26.0 - 34.0 PG Final    MCHC 03/24/2022 32.6  30.0 - 36.5 g/dL Final    RDW 03/24/2022 15.0* 11.5 - 14.5 % Final    PLATELET 09/16/3820 337  150 - 400 K/uL Final    MPV 03/24/2022 11.3  8.9 - 12.9 FL Final    NRBC 03/24/2022 0.0  0  WBC Final    ABSOLUTE NRBC 03/24/2022 0.00  0.00 - 0.01 K/uL Final    Crossmatch Expiration 03/24/2022 04/03/2022,2359   Final    ABO/Rh(D) 03/24/2022 O POSITIVE   Final    Antibody screen 03/24/2022 NEG   Final    INR 03/24/2022 1.0  0.9 - 1.1   Final    A single therapeutic range for Vit K antagonists may not be optimal for all indications - see June, 2008 issue of Chest, American College of Chest Physicians Evidence-Based Clinical Practice Guidelines, 8th Edition.     Prothrombin time 03/24/2022 10.2 9.0 - 11.1 sec Final    Color 03/24/2022 YELLOW/STRAW    Final    Color Reference Range: Straw, Yellow or Dark Yellow    Appearance 03/24/2022 CLEAR  CLEAR   Final    Specific gravity 03/24/2022 1.016  1.003 - 1.030   Final    pH (UA) 03/24/2022 6.5  5.0 - 8.0   Final    Protein 03/24/2022 Negative  NEG mg/dL Final    Glucose 03/24/2022 Negative  NEG mg/dL Final    Ketone 03/24/2022 Negative  NEG mg/dL Final    Bilirubin 03/24/2022 Negative  NEG   Final    Blood 03/24/2022 Negative  NEG   Final    Urobilinogen 03/24/2022 0.2  0.2 - 1.0 EU/dL Final    Nitrites 03/24/2022 Negative  NEG   Final    Leukocyte Esterase 03/24/2022 SMALL* NEG   Final    UA:UC IF INDICATED 03/24/2022 CULTURE NOT INDICATED BY UA RESULT    Final    WBC 03/24/2022 0-4  0 - 4 /hpf Final    RBC 03/24/2022 0-5  0 - 5 /hpf Final    Epithelial cells 03/24/2022 FEW  FEW /lpf Final    Epithelial cell category consists of squamous cells and /or transitional urothelial cells. Renal tubular cells, if present, are separately identified as such.     Bacteria 03/24/2022 Negative  NEG /hpf Final    Hyaline cast 03/24/2022 0-2  0 - 5 /lpf Final    Ventricular Rate 03/24/2022 51  BPM Final    Atrial Rate 03/24/2022 51  BPM Final    P-R Interval 03/24/2022 190  ms Final    QRS Duration 03/24/2022 92  ms Final    Q-T Interval 03/24/2022 434  ms Final    QTC Calculation (Bezet) 03/24/2022 400  ms Final    Calculated P Axis 03/24/2022 49  degrees Final    Calculated R Axis 03/24/2022 4  degrees Final    Calculated T Axis 03/24/2022 39  degrees Final    Diagnosis 03/24/2022    Final                    Value:Sinus bradycardia  Possible Left atrial enlargement  Borderline ECG  When compared with ECG of 18-MAR-2015 10:09,  No significant change was found  Confirmed by Libia Bauman MD. ([id number]) on 3/24/2022 1:50:14 PM      Hemoglobin A1c 03/24/2022 5.4  4.0 - 5.6 % Final    Comment: NEW METHOD  PLEASE NOTE NEW REFERENCE RANGE  (NOTE)  HbA1C Interpretive Ranges  <5.7              Normal  5.7 - 6.4         Consider Prediabetes  >6.5              Consider Diabetes      Est. average glucose 03/24/2022 108  mg/dL Final    Special Requests: 03/24/2022 NO SPECIAL REQUESTS    Final    Culture result: 03/24/2022 MRSA NOT PRESENT    Final       Skin:     Denies open wounds, cuts, sores, rashes or other areas of concern in PAT assessment.           Meganjonathan Sánchez NP

## 2022-03-29 RX ORDER — CELECOXIB 200 MG/1
200 CAPSULE ORAL ONCE
Status: CANCELLED | OUTPATIENT
Start: 2022-03-29 | End: 2022-03-29

## 2022-03-29 RX ORDER — PREGABALIN 75 MG/1
75 CAPSULE ORAL ONCE
Status: CANCELLED | OUTPATIENT
Start: 2022-03-29 | End: 2022-03-29

## 2022-03-29 RX ORDER — ACETAMINOPHEN 500 MG
1000 TABLET ORAL ONCE
Status: CANCELLED | OUTPATIENT
Start: 2022-03-29 | End: 2022-03-29

## 2022-03-30 ENCOUNTER — ANESTHESIA EVENT (OUTPATIENT)
Dept: SURGERY | Age: 67
End: 2022-03-30
Payer: MEDICARE

## 2022-03-31 ENCOUNTER — HOSPITAL ENCOUNTER (OUTPATIENT)
Age: 67
Discharge: HOME HEALTH | End: 2022-04-01
Attending: ORTHOPAEDIC SURGERY | Admitting: ORTHOPAEDIC SURGERY
Payer: MEDICARE

## 2022-03-31 ENCOUNTER — ANESTHESIA (OUTPATIENT)
Dept: SURGERY | Age: 67
End: 2022-03-31
Payer: MEDICARE

## 2022-03-31 DIAGNOSIS — Z96.651 S/P TOTAL KNEE REPLACEMENT, RIGHT: Primary | ICD-10-CM

## 2022-03-31 PROBLEM — M17.0 PRIMARY OSTEOARTHRITIS OF KNEES, BILATERAL: Status: ACTIVE | Noted: 2022-03-31

## 2022-03-31 LAB
GLUCOSE BLD STRIP.AUTO-MCNC: 88 MG/DL (ref 65–117)
SERVICE CMNT-IMP: NORMAL

## 2022-03-31 PROCEDURE — 97161 PT EVAL LOW COMPLEX 20 MIN: CPT

## 2022-03-31 PROCEDURE — 77030039497 HC CST PAD STERILE CHCS -A: Performed by: ORTHOPAEDIC SURGERY

## 2022-03-31 PROCEDURE — 74011250636 HC RX REV CODE- 250/636: Performed by: ORTHOPAEDIC SURGERY

## 2022-03-31 PROCEDURE — 77030028907 HC WRP KNEE WO BGS SOLM -B

## 2022-03-31 PROCEDURE — 74011250637 HC RX REV CODE- 250/637

## 2022-03-31 PROCEDURE — 2709999900 HC NON-CHARGEABLE SUPPLY

## 2022-03-31 PROCEDURE — 76010000172 HC OR TIME 2.5 TO 3 HR INTENSV-TIER 1: Performed by: ORTHOPAEDIC SURGERY

## 2022-03-31 PROCEDURE — 74011000250 HC RX REV CODE- 250: Performed by: ORTHOPAEDIC SURGERY

## 2022-03-31 PROCEDURE — 77030040750 HC INSTR NAV SYS DISP ORLN -G: Performed by: ORTHOPAEDIC SURGERY

## 2022-03-31 PROCEDURE — 2709999900 HC NON-CHARGEABLE SUPPLY: Performed by: ORTHOPAEDIC SURGERY

## 2022-03-31 PROCEDURE — 76060000037 HC ANESTHESIA 3 TO 3.5 HR: Performed by: ORTHOPAEDIC SURGERY

## 2022-03-31 PROCEDURE — 77030039825 HC MSK NSL PAP SUPERNO2VA VYRM -B: Performed by: ANESTHESIOLOGY

## 2022-03-31 PROCEDURE — C1776 JOINT DEVICE (IMPLANTABLE): HCPCS | Performed by: ORTHOPAEDIC SURGERY

## 2022-03-31 PROCEDURE — 74011250636 HC RX REV CODE- 250/636: Performed by: ANESTHESIOLOGY

## 2022-03-31 PROCEDURE — 74011000258 HC RX REV CODE- 258: Performed by: ANESTHESIOLOGY

## 2022-03-31 PROCEDURE — 82962 GLUCOSE BLOOD TEST: CPT

## 2022-03-31 PROCEDURE — 76210000006 HC OR PH I REC 0.5 TO 1 HR: Performed by: ORTHOPAEDIC SURGERY

## 2022-03-31 PROCEDURE — 77030012935 HC DRSG AQUACEL BMS -B: Performed by: ORTHOPAEDIC SURGERY

## 2022-03-31 PROCEDURE — 27447 TOTAL KNEE ARTHROPLASTY: CPT | Performed by: ORTHOPAEDIC SURGERY

## 2022-03-31 PROCEDURE — 74011250637 HC RX REV CODE- 250/637: Performed by: STUDENT IN AN ORGANIZED HEALTH CARE EDUCATION/TRAINING PROGRAM

## 2022-03-31 PROCEDURE — 74011000250 HC RX REV CODE- 250: Performed by: ANESTHESIOLOGY

## 2022-03-31 PROCEDURE — 77030002933 HC SUT MCRYL J&J -A: Performed by: ORTHOPAEDIC SURGERY

## 2022-03-31 PROCEDURE — 74011250636 HC RX REV CODE- 250/636: Performed by: STUDENT IN AN ORGANIZED HEALTH CARE EDUCATION/TRAINING PROGRAM

## 2022-03-31 PROCEDURE — 77030003601 HC NDL NRV BLK BBMI -A

## 2022-03-31 PROCEDURE — 77030031139 HC SUT VCRL2 J&J -A: Performed by: ORTHOPAEDIC SURGERY

## 2022-03-31 PROCEDURE — 74011000250 HC RX REV CODE- 250

## 2022-03-31 PROCEDURE — 77030040922 HC BLNKT HYPOTHRM STRY -A

## 2022-03-31 PROCEDURE — 77030019905 HC CATH URETH INTMIT MDII -A: Performed by: ORTHOPAEDIC SURGERY

## 2022-03-31 PROCEDURE — 77030033067 HC SUT PDO STRATFX SPIR J&J -B: Performed by: ORTHOPAEDIC SURGERY

## 2022-03-31 PROCEDURE — 97116 GAIT TRAINING THERAPY: CPT

## 2022-03-31 PROCEDURE — 77030006835 HC BLD SAW SAG STRY -B: Performed by: ORTHOPAEDIC SURGERY

## 2022-03-31 PROCEDURE — 77030005513 HC CATH URETH FOL11 MDII -B: Performed by: ORTHOPAEDIC SURGERY

## 2022-03-31 PROCEDURE — 20610 DRAIN/INJ JOINT/BURSA W/O US: CPT | Performed by: ORTHOPAEDIC SURGERY

## 2022-03-31 PROCEDURE — 77030006807 HC BLD SAW RECIP KMET -B: Performed by: ORTHOPAEDIC SURGERY

## 2022-03-31 PROCEDURE — 77030000032 HC CUF TRNQT ZIMM -B: Performed by: ORTHOPAEDIC SURGERY

## 2022-03-31 PROCEDURE — 77030010507 HC ADH SKN DERMBND J&J -B: Performed by: ORTHOPAEDIC SURGERY

## 2022-03-31 PROCEDURE — 77030014077 HC TOWER MX CEM J&J -C: Performed by: ORTHOPAEDIC SURGERY

## 2022-03-31 PROCEDURE — 97530 THERAPEUTIC ACTIVITIES: CPT

## 2022-03-31 PROCEDURE — C1713 ANCHOR/SCREW BN/BN,TIS/BN: HCPCS | Performed by: ORTHOPAEDIC SURGERY

## 2022-03-31 PROCEDURE — 74011250636 HC RX REV CODE- 250/636

## 2022-03-31 PROCEDURE — 74011250637 HC RX REV CODE- 250/637: Performed by: ORTHOPAEDIC SURGERY

## 2022-03-31 PROCEDURE — 77030011992 HC AIRWY NASOPHGL TELE -A: Performed by: ANESTHESIOLOGY

## 2022-03-31 DEVICE — EMPOWR 3D KNEETM FEMUR, NP, 8R
Type: IMPLANTABLE DEVICE | Site: KNEE | Status: FUNCTIONAL
Brand: DJO SURGICAL

## 2022-03-31 DEVICE — IMPL CAPPED KNEE K1 TOTAL/HEMI STD CEMENTED DJO: Type: IMPLANTABLE DEVICE | Status: FUNCTIONAL

## 2022-03-31 DEVICE — SMARTSET GMV HIGH PERFORMANCE GENTAMICIN MEDIUM VISCOSITY BONE CEMENT 40G
Type: IMPLANTABLE DEVICE | Site: KNEE | Status: FUNCTIONAL
Brand: SMARTSET

## 2022-03-31 DEVICE — DJO EMPOWR KNEETM, FIN BP MINUS, NP 8R
Type: IMPLANTABLE DEVICE | Site: KNEE | Status: FUNCTIONAL
Brand: DJO SURGICAL

## 2022-03-31 DEVICE — EMPOWR 3D KNEETM INS, 8R 10MM, VE
Type: IMPLANTABLE DEVICE | Site: KNEE | Status: FUNCTIONAL
Brand: DJO SURGICAL

## 2022-03-31 DEVICE — DOMED TRI-PEG PATELLA, 32X8MM, E-PLUS
Type: IMPLANTABLE DEVICE | Site: KNEE | Status: FUNCTIONAL
Brand: DJO SURGICAL

## 2022-03-31 RX ORDER — FENTANYL CITRATE 50 UG/ML
25 INJECTION, SOLUTION INTRAMUSCULAR; INTRAVENOUS
Status: COMPLETED | OUTPATIENT
Start: 2022-03-31 | End: 2022-03-31

## 2022-03-31 RX ORDER — OXYCODONE HYDROCHLORIDE 5 MG/1
5 TABLET ORAL
Status: DISCONTINUED | OUTPATIENT
Start: 2022-03-31 | End: 2022-04-01 | Stop reason: HOSPADM

## 2022-03-31 RX ORDER — ONDANSETRON 2 MG/ML
4 INJECTION INTRAMUSCULAR; INTRAVENOUS AS NEEDED
Status: DISCONTINUED | OUTPATIENT
Start: 2022-03-31 | End: 2022-03-31 | Stop reason: HOSPADM

## 2022-03-31 RX ORDER — SODIUM CHLORIDE 0.9 % (FLUSH) 0.9 %
5-40 SYRINGE (ML) INJECTION AS NEEDED
Status: DISCONTINUED | OUTPATIENT
Start: 2022-03-31 | End: 2022-03-31 | Stop reason: HOSPADM

## 2022-03-31 RX ORDER — ONDANSETRON 2 MG/ML
4 INJECTION INTRAMUSCULAR; INTRAVENOUS
Status: DISCONTINUED | OUTPATIENT
Start: 2022-03-31 | End: 2022-04-01 | Stop reason: HOSPADM

## 2022-03-31 RX ORDER — GLYCOPYRROLATE 0.2 MG/ML
INJECTION INTRAMUSCULAR; INTRAVENOUS AS NEEDED
Status: DISCONTINUED | OUTPATIENT
Start: 2022-03-31 | End: 2022-03-31 | Stop reason: HOSPADM

## 2022-03-31 RX ORDER — AMOXICILLIN 250 MG
1 CAPSULE ORAL 2 TIMES DAILY
Status: DISCONTINUED | OUTPATIENT
Start: 2022-03-31 | End: 2022-04-01 | Stop reason: HOSPADM

## 2022-03-31 RX ORDER — OXYCODONE HYDROCHLORIDE 5 MG/1
5 TABLET ORAL ONCE
Status: COMPLETED | OUTPATIENT
Start: 2022-03-31 | End: 2022-03-31

## 2022-03-31 RX ORDER — ACETAMINOPHEN 500 MG
500 TABLET ORAL
Qty: 70 TABLET | Refills: 0 | Status: SHIPPED | OUTPATIENT
Start: 2022-03-31 | End: 2022-04-14

## 2022-03-31 RX ORDER — OXYCODONE HYDROCHLORIDE 5 MG/1
2.5 TABLET ORAL
Status: DISCONTINUED | OUTPATIENT
Start: 2022-03-31 | End: 2022-04-01 | Stop reason: HOSPADM

## 2022-03-31 RX ORDER — ROPIVACAINE HYDROCHLORIDE 5 MG/ML
INJECTION, SOLUTION EPIDURAL; INFILTRATION; PERINEURAL
Status: COMPLETED | OUTPATIENT
Start: 2022-03-31 | End: 2022-03-31

## 2022-03-31 RX ORDER — METHYLPREDNISOLONE ACETATE 40 MG/ML
INJECTION, SUSPENSION INTRA-ARTICULAR; INTRALESIONAL; INTRAMUSCULAR; SOFT TISSUE AS NEEDED
Status: DISCONTINUED | OUTPATIENT
Start: 2022-03-31 | End: 2022-03-31 | Stop reason: HOSPADM

## 2022-03-31 RX ORDER — BUPIVACAINE HYDROCHLORIDE 5 MG/ML
INJECTION, SOLUTION EPIDURAL; INTRACAUDAL
Status: COMPLETED | OUTPATIENT
Start: 2022-03-31 | End: 2022-03-31

## 2022-03-31 RX ORDER — SODIUM CHLORIDE, SODIUM LACTATE, POTASSIUM CHLORIDE, CALCIUM CHLORIDE 600; 310; 30; 20 MG/100ML; MG/100ML; MG/100ML; MG/100ML
50 INJECTION, SOLUTION INTRAVENOUS CONTINUOUS
Status: DISCONTINUED | OUTPATIENT
Start: 2022-03-31 | End: 2022-03-31 | Stop reason: HOSPADM

## 2022-03-31 RX ORDER — ACETAMINOPHEN 500 MG
1000 TABLET ORAL ONCE
Status: COMPLETED | OUTPATIENT
Start: 2022-03-31 | End: 2022-03-31

## 2022-03-31 RX ORDER — FENTANYL CITRATE 50 UG/ML
INJECTION, SOLUTION INTRAMUSCULAR; INTRAVENOUS
Status: DISPENSED
Start: 2022-03-31 | End: 2022-04-01

## 2022-03-31 RX ORDER — PROPOFOL 10 MG/ML
INJECTION, EMULSION INTRAVENOUS AS NEEDED
Status: DISCONTINUED | OUTPATIENT
Start: 2022-03-31 | End: 2022-03-31 | Stop reason: HOSPADM

## 2022-03-31 RX ORDER — CELECOXIB 200 MG/1
200 CAPSULE ORAL ONCE
Status: COMPLETED | OUTPATIENT
Start: 2022-03-31 | End: 2022-03-31

## 2022-03-31 RX ORDER — NALOXONE HYDROCHLORIDE 0.4 MG/ML
0.4 INJECTION, SOLUTION INTRAMUSCULAR; INTRAVENOUS; SUBCUTANEOUS AS NEEDED
Status: DISCONTINUED | OUTPATIENT
Start: 2022-03-31 | End: 2022-04-01 | Stop reason: HOSPADM

## 2022-03-31 RX ORDER — PRAVASTATIN SODIUM 20 MG/1
10 TABLET ORAL DAILY
Status: DISCONTINUED | OUTPATIENT
Start: 2022-04-01 | End: 2022-04-01 | Stop reason: HOSPADM

## 2022-03-31 RX ORDER — ACETAMINOPHEN 500 MG
500 TABLET ORAL
Status: DISCONTINUED | OUTPATIENT
Start: 2022-03-31 | End: 2022-04-01 | Stop reason: HOSPADM

## 2022-03-31 RX ORDER — PROPOFOL 10 MG/ML
INJECTION, EMULSION INTRAVENOUS
Status: DISCONTINUED | OUTPATIENT
Start: 2022-03-31 | End: 2022-03-31 | Stop reason: HOSPADM

## 2022-03-31 RX ORDER — LIDOCAINE HYDROCHLORIDE 10 MG/ML
0.1 INJECTION, SOLUTION EPIDURAL; INFILTRATION; INTRACAUDAL; PERINEURAL AS NEEDED
Status: DISCONTINUED | OUTPATIENT
Start: 2022-03-31 | End: 2022-03-31 | Stop reason: HOSPADM

## 2022-03-31 RX ORDER — SODIUM CHLORIDE, SODIUM LACTATE, POTASSIUM CHLORIDE, CALCIUM CHLORIDE 600; 310; 30; 20 MG/100ML; MG/100ML; MG/100ML; MG/100ML
INJECTION, SOLUTION INTRAVENOUS
Status: DISCONTINUED | OUTPATIENT
Start: 2022-03-31 | End: 2022-03-31 | Stop reason: HOSPADM

## 2022-03-31 RX ORDER — KETOROLAC TROMETHAMINE 30 MG/ML
15 INJECTION, SOLUTION INTRAMUSCULAR; INTRAVENOUS EVERY 6 HOURS
Status: DISCONTINUED | OUTPATIENT
Start: 2022-03-31 | End: 2022-04-01 | Stop reason: HOSPADM

## 2022-03-31 RX ORDER — PREGABALIN 75 MG/1
75 CAPSULE ORAL ONCE
Status: COMPLETED | OUTPATIENT
Start: 2022-03-31 | End: 2022-03-31

## 2022-03-31 RX ORDER — POLYETHYLENE GLYCOL 3350 17 G/17G
17 POWDER, FOR SOLUTION ORAL DAILY
Qty: 14 PACKET | Refills: 0 | Status: SHIPPED | OUTPATIENT
Start: 2022-04-01 | End: 2022-04-15

## 2022-03-31 RX ORDER — FACIAL-BODY WIPES
10 EACH TOPICAL DAILY PRN
Status: DISCONTINUED | OUTPATIENT
Start: 2022-04-02 | End: 2022-04-01 | Stop reason: HOSPADM

## 2022-03-31 RX ORDER — AMOXICILLIN 250 MG
1 CAPSULE ORAL 2 TIMES DAILY
Qty: 28 TABLET | Refills: 0 | Status: SHIPPED | OUTPATIENT
Start: 2022-03-31 | End: 2022-04-14

## 2022-03-31 RX ORDER — PHENYLEPHRINE HCL IN 0.9% NACL 0.4MG/10ML
SYRINGE (ML) INTRAVENOUS
Status: DISCONTINUED | OUTPATIENT
Start: 2022-03-31 | End: 2022-03-31 | Stop reason: HOSPADM

## 2022-03-31 RX ORDER — SODIUM CHLORIDE 0.9 % (FLUSH) 0.9 %
5-40 SYRINGE (ML) INJECTION AS NEEDED
Status: DISCONTINUED | OUTPATIENT
Start: 2022-03-31 | End: 2022-04-01 | Stop reason: HOSPADM

## 2022-03-31 RX ORDER — FENTANYL CITRATE 50 UG/ML
25 INJECTION, SOLUTION INTRAMUSCULAR; INTRAVENOUS
Status: DISCONTINUED | OUTPATIENT
Start: 2022-03-31 | End: 2022-04-01 | Stop reason: HOSPADM

## 2022-03-31 RX ORDER — SODIUM CHLORIDE 9 MG/ML
125 INJECTION, SOLUTION INTRAVENOUS CONTINUOUS
Status: DISCONTINUED | OUTPATIENT
Start: 2022-03-31 | End: 2022-04-01 | Stop reason: HOSPADM

## 2022-03-31 RX ORDER — ROPIVACAINE HYDROCHLORIDE 5 MG/ML
30 INJECTION, SOLUTION EPIDURAL; INFILTRATION; PERINEURAL AS NEEDED
Status: DISCONTINUED | OUTPATIENT
Start: 2022-03-31 | End: 2022-03-31 | Stop reason: HOSPADM

## 2022-03-31 RX ORDER — MIDAZOLAM HYDROCHLORIDE 1 MG/ML
1 INJECTION, SOLUTION INTRAMUSCULAR; INTRAVENOUS AS NEEDED
Status: DISCONTINUED | OUTPATIENT
Start: 2022-03-31 | End: 2022-03-31 | Stop reason: HOSPADM

## 2022-03-31 RX ORDER — HYDROXYZINE HYDROCHLORIDE 10 MG/1
10 TABLET, FILM COATED ORAL
Status: DISCONTINUED | OUTPATIENT
Start: 2022-03-31 | End: 2022-04-01 | Stop reason: HOSPADM

## 2022-03-31 RX ORDER — FENTANYL CITRATE 50 UG/ML
50 INJECTION, SOLUTION INTRAMUSCULAR; INTRAVENOUS AS NEEDED
Status: DISCONTINUED | OUTPATIENT
Start: 2022-03-31 | End: 2022-03-31 | Stop reason: HOSPADM

## 2022-03-31 RX ORDER — TRANEXAMIC ACID 100 MG/ML
INJECTION, SOLUTION INTRAVENOUS AS NEEDED
Status: DISCONTINUED | OUTPATIENT
Start: 2022-03-31 | End: 2022-03-31 | Stop reason: HOSPADM

## 2022-03-31 RX ORDER — ONDANSETRON 2 MG/ML
INJECTION INTRAMUSCULAR; INTRAVENOUS AS NEEDED
Status: DISCONTINUED | OUTPATIENT
Start: 2022-03-31 | End: 2022-03-31 | Stop reason: HOSPADM

## 2022-03-31 RX ORDER — POLYETHYLENE GLYCOL 3350 17 G/17G
17 POWDER, FOR SOLUTION ORAL DAILY
Status: DISCONTINUED | OUTPATIENT
Start: 2022-04-01 | End: 2022-04-01 | Stop reason: HOSPADM

## 2022-03-31 RX ORDER — HYDROMORPHONE HYDROCHLORIDE 1 MG/ML
0.5 INJECTION, SOLUTION INTRAMUSCULAR; INTRAVENOUS; SUBCUTANEOUS
Status: DISCONTINUED | OUTPATIENT
Start: 2022-03-31 | End: 2022-04-01 | Stop reason: HOSPADM

## 2022-03-31 RX ORDER — SODIUM CHLORIDE 0.9 % (FLUSH) 0.9 %
5-40 SYRINGE (ML) INJECTION EVERY 8 HOURS
Status: DISCONTINUED | OUTPATIENT
Start: 2022-03-31 | End: 2022-04-01 | Stop reason: HOSPADM

## 2022-03-31 RX ORDER — OXYCODONE HYDROCHLORIDE 5 MG/1
TABLET ORAL
Status: DISPENSED
Start: 2022-03-31 | End: 2022-04-01

## 2022-03-31 RX ORDER — LIDOCAINE HYDROCHLORIDE 20 MG/ML
INJECTION, SOLUTION EPIDURAL; INFILTRATION; INTRACAUDAL; PERINEURAL AS NEEDED
Status: DISCONTINUED | OUTPATIENT
Start: 2022-03-31 | End: 2022-03-31 | Stop reason: HOSPADM

## 2022-03-31 RX ORDER — SODIUM CHLORIDE 0.9 % (FLUSH) 0.9 %
5-40 SYRINGE (ML) INJECTION EVERY 8 HOURS
Status: DISCONTINUED | OUTPATIENT
Start: 2022-03-31 | End: 2022-03-31 | Stop reason: HOSPADM

## 2022-03-31 RX ADMIN — SODIUM CHLORIDE 125 ML/HR: 900 INJECTION, SOLUTION INTRAVENOUS at 13:00

## 2022-03-31 RX ADMIN — GLYCOPYRROLATE 0.2 MG: 0.2 INJECTION, SOLUTION INTRAMUSCULAR; INTRAVENOUS at 09:24

## 2022-03-31 RX ADMIN — KETOROLAC TROMETHAMINE 15 MG: 30 INJECTION, SOLUTION INTRAMUSCULAR; INTRAVENOUS at 19:29

## 2022-03-31 RX ADMIN — ACETAMINOPHEN 1000 MG: 500 TABLET ORAL at 08:36

## 2022-03-31 RX ADMIN — SODIUM CHLORIDE, POTASSIUM CHLORIDE, SODIUM LACTATE AND CALCIUM CHLORIDE: 600; 310; 30; 20 INJECTION, SOLUTION INTRAVENOUS at 09:18

## 2022-03-31 RX ADMIN — ACETAMINOPHEN 500 MG: 500 TABLET ORAL at 21:56

## 2022-03-31 RX ADMIN — PREGABALIN 75 MG: 75 CAPSULE ORAL at 08:36

## 2022-03-31 RX ADMIN — PROPOFOL 25 MG: 10 INJECTION, EMULSION INTRAVENOUS at 09:36

## 2022-03-31 RX ADMIN — OXYCODONE 5 MG: 5 TABLET ORAL at 15:53

## 2022-03-31 RX ADMIN — SODIUM CHLORIDE 125 ML/HR: 900 INJECTION, SOLUTION INTRAVENOUS at 22:01

## 2022-03-31 RX ADMIN — BUPIVACAINE HYDROCHLORIDE 10 MG: 5 INJECTION, SOLUTION EPIDURAL; INTRACAUDAL; PERINEURAL at 09:25

## 2022-03-31 RX ADMIN — ONDANSETRON HYDROCHLORIDE 4 MG: 2 INJECTION, SOLUTION INTRAMUSCULAR; INTRAVENOUS at 09:54

## 2022-03-31 RX ADMIN — DEXMEDETOMIDINE HYDROCHLORIDE 5 MCG: 100 INJECTION, SOLUTION, CONCENTRATE INTRAVENOUS at 09:45

## 2022-03-31 RX ADMIN — FENTANYL CITRATE 25 MCG: 50 INJECTION, SOLUTION INTRAMUSCULAR; INTRAVENOUS at 12:35

## 2022-03-31 RX ADMIN — FENTANYL CITRATE 25 MCG: 50 INJECTION, SOLUTION INTRAMUSCULAR; INTRAVENOUS at 12:30

## 2022-03-31 RX ADMIN — FENTANYL CITRATE 50 MCG: 50 INJECTION, SOLUTION INTRAMUSCULAR; INTRAVENOUS at 08:53

## 2022-03-31 RX ADMIN — FENTANYL CITRATE 25 MCG: 50 INJECTION, SOLUTION INTRAMUSCULAR; INTRAVENOUS at 13:30

## 2022-03-31 RX ADMIN — CELECOXIB 200 MG: 200 CAPSULE ORAL at 08:36

## 2022-03-31 RX ADMIN — GLYCOPYRROLATE 0.2 MG: 0.2 INJECTION, SOLUTION INTRAMUSCULAR; INTRAVENOUS at 09:46

## 2022-03-31 RX ADMIN — Medication 20 MCG/MIN: at 09:27

## 2022-03-31 RX ADMIN — LIDOCAINE HYDROCHLORIDE 60 MG: 20 INJECTION, SOLUTION EPIDURAL; INFILTRATION; INTRACAUDAL; PERINEURAL at 09:24

## 2022-03-31 RX ADMIN — WATER 2 G: 1 INJECTION INTRAMUSCULAR; INTRAVENOUS; SUBCUTANEOUS at 09:54

## 2022-03-31 RX ADMIN — PROPOFOL 50 MCG/KG/MIN: 10 INJECTION, EMULSION INTRAVENOUS at 09:24

## 2022-03-31 RX ADMIN — FENTANYL CITRATE 25 MCG: 50 INJECTION, SOLUTION INTRAMUSCULAR; INTRAVENOUS at 12:45

## 2022-03-31 RX ADMIN — SENNOSIDES AND DOCUSATE SODIUM 1 TABLET: 50; 8.6 TABLET ORAL at 19:21

## 2022-03-31 RX ADMIN — ACETAMINOPHEN 500 MG: 500 TABLET ORAL at 19:21

## 2022-03-31 RX ADMIN — LIDOCAINE HYDROCHLORIDE 0.1 ML: 10 INJECTION, SOLUTION EPIDURAL; INFILTRATION; INTRACAUDAL; PERINEURAL at 08:28

## 2022-03-31 RX ADMIN — ROPIVACAINE HYDROCHLORIDE 30 ML: 5 INJECTION, SOLUTION EPIDURAL; INFILTRATION; PERINEURAL at 09:10

## 2022-03-31 RX ADMIN — FENTANYL CITRATE 25 MCG: 50 INJECTION, SOLUTION INTRAMUSCULAR; INTRAVENOUS at 12:50

## 2022-03-31 RX ADMIN — OXYCODONE 5 MG: 5 TABLET ORAL at 19:21

## 2022-03-31 RX ADMIN — SODIUM CHLORIDE, PRESERVATIVE FREE 5 ML: 5 INJECTION INTRAVENOUS at 22:00

## 2022-03-31 RX ADMIN — DEXMEDETOMIDINE HYDROCHLORIDE 5 MCG: 100 INJECTION, SOLUTION, CONCENTRATE INTRAVENOUS at 09:37

## 2022-03-31 RX ADMIN — OXYCODONE 5 MG: 5 TABLET ORAL at 13:30

## 2022-03-31 RX ADMIN — MIDAZOLAM 2 MG: 1 INJECTION INTRAMUSCULAR; INTRAVENOUS at 08:53

## 2022-03-31 RX ADMIN — SODIUM CHLORIDE, POTASSIUM CHLORIDE, SODIUM LACTATE AND CALCIUM CHLORIDE 50 ML/HR: 600; 310; 30; 20 INJECTION, SOLUTION INTRAVENOUS at 08:28

## 2022-03-31 RX ADMIN — PROPOFOL 25 MG: 10 INJECTION, EMULSION INTRAVENOUS at 10:38

## 2022-03-31 RX ADMIN — PROPOFOL 25 MG: 10 INJECTION, EMULSION INTRAVENOUS at 09:24

## 2022-03-31 RX ADMIN — DEXMEDETOMIDINE HYDROCHLORIDE 5 MCG: 100 INJECTION, SOLUTION, CONCENTRATE INTRAVENOUS at 10:29

## 2022-03-31 RX ADMIN — TRANEXAMIC ACID 1 G: 100 INJECTION, SOLUTION INTRAVENOUS at 09:54

## 2022-03-31 RX ADMIN — CEFAZOLIN SODIUM 2 G: 1 INJECTION, POWDER, FOR SOLUTION INTRAMUSCULAR; INTRAVENOUS at 15:52

## 2022-03-31 NOTE — PROGRESS NOTES
Ortho NP Note    POD# 0  s/p RIGHT TOTAL KNEE ARTHROPLASTY AND LEFT KNEE CORTOSONE INJECTION  (SPINAL WBLOCK W/IV SEDATION)     Pt resting in bed. Currently rating pain 4/10. States she has taken oxycodone in the past and tolerated well. Understands scheduled and PRN pain management plan. Reports that she is ready to work with therapy to get up to [facility] as she feels she needs to void. Patient has not had something to eat/drink. No nausea. VSS Afebrile. Visit Vitals  BP (!) 156/83   Pulse (!) 55   Temp 97.7 °F (36.5 °C)   Resp 16   Ht 5' 4\" (1.626 m)   Wt 105.2 kg (232 lb)   SpO2 100%   BMI 39.82 kg/m²       Most Recent Labs:   Lab Results   Component Value Date/Time    HGB 12.4 03/24/2022 08:15 AM    Hemoglobin A1c 5.4 03/24/2022 08:15 AM    Hemoglobin A1c, External 5.6 11/12/2019 12:00 AM       Body mass index is 39.82 kg/m². Reference: BMI greater than 30 is classified as obesity and greater than 40 is classified as morbid obesity. STOP BANG Score: 3    Voiding status: remains due to void    Ace wrap + gauze to R LE c.d.i. Cryotherapy in place over incision. Bilateral LEs warm, dry. 1+ DP pulses  Sensation and motor intact. DF/PF/EHL 4+/5. Foot Pumps for mechanical DVT proph    Plan:  1) PT: starting today, WBAT  2) Yaquelin-op Antibiotics Ancef  3) Pain:  Received tylenol, lyrica, celebrex in preop. Perioperative anesthesia: Spinal + adductor canal block. Post operative analgesia includes scheduled tylenol, toradol and PRN oxycodone or dilaudid depending on pain severity  4) DVT Prophylaxis:  Eliquis 2.5 mg PO BID. Encouraged early mobilization, bed exercises, and SCD use. 5) Discharge plans: to home with family support and HHPT.   Pharmacy: [facility] with patient d/t plan for Eliquis 2/2 ASA allergy        Justine John NP

## 2022-03-31 NOTE — ANESTHESIA PROCEDURE NOTES
Spinal Block    Start time: 3/31/2022 9:20 AM  End time: 3/31/2022 9:25 AM  Performed by: Tiffany Lee MD  Authorized by: Tiffany Lee MD     Pre-procedure:   Indications: at surgeon's request and primary anesthetic  Preanesthetic Checklist: patient identified, risks and benefits discussed, anesthesia consent, site marked, patient being monitored and timeout performed    Timeout Time: 09:20 EDT          Spinal Block:   Patient Position:  Seated  Prep Region:  Lumbar  Prep: Betadine      Location:  L2-3  Technique:  Single shot        Needle:   Needle Type:  Pencan  Needle Gauge:  24 G  Attempts:  1      Events: CSF confirmed, no blood with aspiration and no paresthesia        Assessment:  Insertion:  Uncomplicated  Patient tolerance:  Patient tolerated the procedure well with no immediate complications

## 2022-03-31 NOTE — ANESTHESIA POSTPROCEDURE EVALUATION
Post-Anesthesia Evaluation and Assessment    Patient: Rosa Lopez MRN: [id number]  SSN: [id number]    YOB: 1955  Age: 74 y.o. Sex: female      I have evaluated the patient and they are stable and ready for discharge from the PACU. Cardiovascular Function/Vital Signs  Visit Vitals  /71   Pulse 68   Temp 36.4 °C (97.6 °F)   Resp 16   Ht 5' 4\" (1.626 m)   Wt 105.2 kg (232 lb)   SpO2 100%   BMI 39.82 kg/m²       Patient is status post Spinal anesthesia for Procedure(s):  RIGHT TOTAL KNEE ARTHROPLASTY AND LEFT KNEE CORTOSONE INJECTION  (SPINAL WBLOCK W/IV SEDATION). Nausea/Vomiting: None    Postoperative hydration reviewed and adequate. Pain:  Pain Scale 1: Numeric (0 - 10) (03/31/22 0757)  Pain Intensity 1: 0 (03/31/22 0757)   Managed    Neurological Status:   Neuro (WDL): Within Defined Limits (03/31/22 0812)   At baseline    Mental Status, Level of Consciousness: Alert and  oriented to person, place, and time    Pulmonary Status:   O2 Device: Nasal cannula (03/31/22 1219)   Adequate oxygenation and airway patent    Complications related to anesthesia: None    Post-anesthesia assessment completed. No concerns    Signed By: Wilfredo Henson MD     March 31, 2022              Procedure(s):  RIGHT TOTAL KNEE ARTHROPLASTY AND LEFT KNEE CORTOSONE INJECTION  ([address] [place] [address] W/IV SEDATION). MAC, regional, spinal    <BSHSIANPOST>    INITIAL Post-op Vital signs:   Vitals Value Taken Time   /76 03/31/22 1225   Temp 36.4 °C (97.6 °F) 03/31/22 1219   Pulse 63 03/31/22 1228   Resp 18 03/31/22 1228   SpO2 99 % 03/31/22 1228   Vitals shown include unvalidated device data.

## 2022-03-31 NOTE — ANESTHESIA PROCEDURE NOTES
Peripheral Block    Start time: 3/31/2022 9:00 AM  End time: 3/31/2022 9:10 AM  Performed by: Garnet Meigs, MD  Authorized by: Garnet Meigs, MD       Pre-procedure: Indications: at surgeon's request    Preanesthetic Checklist: patient identified, risks and benefits discussed, site marked, timeout performed, anesthesia consent given and patient being monitored    Timeout Time: 09:10 EDT          Block Type:   Block Type:   Adductor canal block  Laterality:  Right  Monitoring:  Oxygen, responsive to questions, standard ASA monitoring, continuous pulse ox, frequent vital sign checks and heart rate  Injection Technique:  Single shot  Procedures: ultrasound guided    Patient Position: supine  Prep: chlorhexidine    Location:  Mid thigh  Needle Type:  Stimuplex  Needle Gauge:  20 G  Needle Localization:  Ultrasound guidance  Medication Injected:  Ropivacaine (PF) (NAROPIN)(0.5%) 5 mg/mL injection, 30 mL  Med Admin Time: 3/31/2022 9:10 AM    Assessment:  Number of attempts:  1  Injection Assessment:  No paresthesia, negative aspiration for CSF, incremental injection every 5 mL, no intravascular symptoms, negative aspiration for blood, local visualized surrounding nerve on ultrasound and low pressure verified by pressure monitor  Patient tolerance:  Patient tolerated the procedure well with no immediate complications

## 2022-03-31 NOTE — PERIOP NOTES
9643U: RT leg adductor canal nerve block done by Dr Steffen Cruz using 30cc of 0.5% ropivicaine w/ US guidance, with pt monitored, O2 @ 2l/m/nc and IV sedation given. Pt tolerated procedure well. VSS post block: 131/69-57-(10-12), ToB4=843% on 2l/m/nc. Pt groggy but arouses easily to verbal stimuli. See anesthesia note.

## 2022-03-31 NOTE — PROGRESS NOTES
Problem: Mobility Impaired (Adult and Pediatric)  Goal: *Acute Goals and Plan of Care (Insert Text)  Description: FUNCTIONAL STATUS PRIOR TO ADMISSION: Patient was independent and active without use of DME.    HOME SUPPORT PRIOR TO ADMISSION: The patient lived with 79 yo mother but did not require assist.  Pt's niece is at her home taking care of her mother. At discharge pt has hired paid caregivers to assist in home. Physical Therapy Goals  Initiated 3/31/2022    1. Patient will move from supine to sit and sit to supine  and scoot up and down in bed with modified independence within 4 days. 2. Patient will perform sit to stand with modified independence within 4 days. 3. Patient will ambulate with modified independence for > 150 feet with the least restrictive device within 4 days. 4. Patient will ascend/descend 4 stairs with one handrail(s) with modified independence within 4 days. 5. Patient will perform home exercise program per protocol with supervision/set-up within 4 days. 6. Patient will demonstrate AROM 0-90 degrees in operative joint within 4 days. PHYSICAL THERAPY EVALUATION  Patient: Mahesh Hannah (56 y.o. female)  Date: 3/31/2022  Primary Diagnosis: Primary osteoarthritis of right knee [M17.11]  Primary osteoarthritis of knees, bilateral [M17.0]  Procedure(s) (LRB):  RIGHT TOTAL KNEE ARTHROPLASTY AND LEFT KNEE CORTOSONE INJECTION  (SPINAL WBLOCK W/IV SEDATION) (Right) Day of Surgery   Precautions:   WBAT,Fall    ASSESSMENT  Based on the objective data described below, the patient presents POD# 0 Right TKA and left knee cortisone injection with right knee pain, decreased AROM/strength and function right leg, decreased activity tolerance, decline in functional mobility and impaired standing balance gait with RW. Pt mobilized without difficulty - no c/o dizziness.   Anticipate pt will be able to advance amb distance, exercise and perform stairs during am session - and be cleared from PT for discharge. Current Level of Function Impacting Discharge (mobility/balance): Standby guard supine to/from bed; CGA for transfers/amb with RW    Functional Outcome Measure: The patient scored 55/100 on the Barthel Index outcome measure . Other factors to consider for discharge: Will be able to use stair chair lift in home, has paid caregivers arranged; s/p Right THA 2012     Patient will benefit from skilled therapy intervention to address the above noted impairments. PLAN :  Recommendations and Planned Interventions: bed mobility training, transfer training, gait training, therapeutic exercises, patient and family training/education, and therapeutic activities      Frequency/Duration: Patient will be followed by physical therapy:  twice daily to address goals. Recommendation for discharge: (in order for the patient to meet his/her long term goals)  Physical therapy at least 2 days/week in the home     This discharge recommendation:  Has been made in collaboration with the attending provider and/or case management    IF patient discharges home will need the following DME: patient owns DME required for discharge         SUBJECTIVE:   Patient stated I did the on-line course for joint replacement.     OBJECTIVE DATA SUMMARY:   HISTORY:    Past Medical History:   Diagnosis Date    Arthritis     BOTH HIPS PAINFUL    Chronic pain     Diabetes ([address]     PRE    Difficulty sleeping     Frequent urination     GERD (gastroesophageal reflux disease)     High cholesterol     Spinal stenosis, lumbar      Past Surgical History:   Procedure Laterality Date    HX HEENT  1980s    VOCAL CORD POLYP EXCISED    HX HIP REPLACEMENT Right 05/2012    HX ORTHOPAEDIC  1990s    FOOT SURGERY    HX ORTHOPAEDIC      back surgery    HX TONSILLECTOMY  CHILDHOOD       Personal factors and/or comorbidities impacting plan of care: as above    Home Situation  Home Environment: Private residence  # Steps to Enter: 0 (pt has stair chair lift from garage to main level)  One/Two Story Residence: Two story, live on 1st floor (has stair chair lift to 2nd floor)  Living Alone: No  Support Systems: Parent(s),Other Family Member(s) (81 yo mother lives with patient; niece helping out)  Patient Expects to be Discharged to[de-identified] Home with home health (Pt has hired paid caregivers to assist at discharge )  Current DME Used/Available at Home: [address], [address], straight,Crutches,Walker, rolling,Grab bars  Tub or Shower Type: Shower    EXAMINATION/PRESENTATION/DECISION MAKING:   Critical Behavior:  Neurologic State: Alert  Orientation Level: Oriented X4  Cognition: Appropriate decision making,Appropriate safety awareness  Safety/Judgement: Awareness of environment,Good awareness of safety precautions  Hearing: Auditory  Auditory Impairment: None  Skin:  Right leg covered with ace wrap - no drainage noted  Range Of Motion:  AROM: Within functional limits (except right leg)                       Strength:    Strength: Within functional limits (except right leg)                    Tone & Sensation:   Tone: Normal              Sensation: Intact               Coordination:  Coordination: Within functional limits  Functional Mobility:  Bed Mobility:     Supine to Sit: Stand-by assistance  Sit to Supine: Stand-by assistance  Scooting: Stand-by assistance  Transfers:  Sit to Stand: Contact guard assistance  Stand to Sit: Contact guard assistance  Stand Pivot Transfers: Contact guard assistance                    Balance:   Sitting: Intact; Without support  Standing: Impaired; With support  Standing - Static: Good;Constant support  Standing - Dynamic : Good;Constant support  Ambulation/Gait Training:  Distance (ft): 45 Feet (ft)  Assistive Device: Walker, rolling;Gait belt  Ambulation - Level of Assistance: Contact guard assistance        Gait Abnormalities: Antalgic;Decreased step clearance  Right Side Weight Bearing: As tolerated  Left Side Weight Bearing: Full  Base of Support: Center of gravity altered;Shift to left  Stance: Right decreased  Speed/Toya: Slow  Step Length: Right shortened;Left shortened  Swing Pattern: Right asymmetrical              Therapeutic Exercises:   Pt instructed and performed ankle pumps (x 10 once hr), quad sets, hamstring sets and heel slides ( 2-3 reps once hr). Pt also instructed and demonstrated proper use of incentive spirometer - to utilized once hr when awake - x 10. Functional Measure:  Barthel Index:    Bathin  Bladder: 10  Bowels: 10  Groomin  Dressin  Feeding: 10  Mobility: 0  Stairs: 0  Toilet Use: 5  Transfer (Bed to Chair and Back): 10  Total: 55/100       The Barthel ADL Index: Guidelines  1. The index should be used as a record of what a patient does, not as a record of what a patient could do. 2. The main aim is to establish degree of independence from any help, physical or verbal, however minor and for whatever reason. 3. The need for supervision renders the patient not independent. 4. A patient's performance should be established using the best available evidence. Asking the patient, friends/relatives and nurses are the usual sources, but direct observation and common sense are also important. However direct testing is not needed. 5. Usually the patient's performance over the preceding 24-48 hours is important, but occasionally longer periods will be relevant. 6. Middle categories imply that the patient supplies over 50 per cent of the effort. 7. Use of aids to be independent is allowed. Score Interpretation (from [address] [address])    Independent   60-79 Minimally independent   40-59 Partially dependent   20-39 Very dependent   <20 Totally dependent     -Sahara Shultz., Barthel, D.W. (1965). Functional evaluation: the Barthel Index. [address] [facility] ([address] (1997). The Barthel activities of daily living index: self-reporting versus actual performance in the old (> or = 75 years).  Journal of American Geriatric Society 45(7), 14 [facility], Julio CJERICA, Sheila Doe.Jasmina. (1999). Measuring the change in disability after inpatient rehabilitation; comparison of the responsiveness of the Barthel Index and Functional [place] Measure. Journal of Neurology, Neurosurgery, and Psychiatry, 66(4), 830-301. SILVANA Sosa, BENJAMIN Durham, & Kiera Kinsey M.A. (2004) Assessment of post-stroke quality of life in cost-effectiveness studies: The usefulness of the Barthel Index and the EuroQoL-5D. Quality of Life Research, 15, 323-33           Physical Therapy Evaluation Charge Determination   History Examination Presentation Decision-Making   LOW Complexity : Zero comorbidities / personal factors that will impact the outcome / POC LOW Complexity : 1-2 Standardized tests and measures addressing body structure, function, activity limitation and / or participation in recreation  LOW Complexity : Stable, uncomplicated  LOW Complexity : FOTO score of       Based on the above components, the patient evaluation is determined to be of the following complexity level: LOW     Pain Rating:  Right knee 5/10; after amb 3-4/10    Activity Tolerance:   Good - POD# 0 mobilized without difficulty     After treatment patient left in no apparent distress:   Supine in bed, Call bell within reach, Side rails x 3, and ice applied right knee    COMMUNICATION/EDUCATION:   The patients plan of care was discussed with: Registered nurse. Fall prevention education was provided and the patient/caregiver indicated understanding., Patient/family have participated as able in goal setting and plan of care. , and Patient/family agree to work toward stated goals and plan of care.     Thank you for this referral.  Magdi Kumar, PT   Time Calculation: 35 mins

## 2022-03-31 NOTE — ROUTINE PROCESS
Patient: Kanwal Flores MRN: [id number]  SSN: [id number]   YOB: 1955  Age: 74 y.o. Sex: female     Patient is status post Procedure(s):  RIGHT TOTAL KNEE ARTHROPLASTY AND LEFT KNEE CORTOSONE INJECTION  (SPINAL WBLOCK W/IV SEDATION). Surgeon(s) and Role:     Bg Mark MD - Primary    Local/Dose/Irrigation:  100 mL Surgical Pain Solution                  Peripheral IV 03/31/22 Distal;Left;Posterior Wrist (Active)   Site Assessment Clean, dry, & intact 03/31/22 0821   Phlebitis Assessment 0 03/31/22 0821   Infiltration Assessment 0 03/31/22 0821   Dressing Status Clean, dry, & intact 03/31/22 0821   Dressing Type Transparent 03/31/22 0821   Hub Color/Line Status Infusing 03/31/22 4112                           Dressing/Packing:  Incision 03/31/22 Knee Right-Dressing/Treatment: Ace wrap;Alginate with Ag;Cast padding; Liquid /adhesive (03/31/22 1100)    Splint/Cast: NO

## 2022-04-01 VITALS
RESPIRATION RATE: 14 BRPM | TEMPERATURE: 98.2 F | HEIGHT: 64 IN | WEIGHT: 232 LBS | DIASTOLIC BLOOD PRESSURE: 67 MMHG | HEART RATE: 66 BPM | SYSTOLIC BLOOD PRESSURE: 131 MMHG | OXYGEN SATURATION: 99 % | BODY MASS INDEX: 39.61 KG/M2

## 2022-04-01 LAB
ANION GAP SERPL CALC-SCNC: 2 MMOL/L (ref 5–15)
BUN SERPL-MCNC: 16 MG/DL (ref 6–20)
BUN/CREAT SERPL: 21 (ref 12–20)
CALCIUM SERPL-MCNC: 8.5 MG/DL (ref 8.5–10.1)
CHLORIDE SERPL-SCNC: 111 MMOL/L (ref 97–108)
CO2 SERPL-SCNC: 26 MMOL/L (ref 21–32)
CREAT SERPL-MCNC: 0.75 MG/DL (ref 0.55–1.02)
GLUCOSE SERPL-MCNC: 106 MG/DL (ref 65–100)
HGB BLD-MCNC: 9.8 G/DL (ref 11.5–16)
POTASSIUM SERPL-SCNC: 4.5 MMOL/L (ref 3.5–5.1)
SODIUM SERPL-SCNC: 139 MMOL/L (ref 136–145)

## 2022-04-01 PROCEDURE — 80048 BASIC METABOLIC PNL TOTAL CA: CPT

## 2022-04-01 PROCEDURE — 97116 GAIT TRAINING THERAPY: CPT

## 2022-04-01 PROCEDURE — 74011250636 HC RX REV CODE- 250/636: Performed by: ORTHOPAEDIC SURGERY

## 2022-04-01 PROCEDURE — 74011250637 HC RX REV CODE- 250/637: Performed by: ORTHOPAEDIC SURGERY

## 2022-04-01 PROCEDURE — 85018 HEMOGLOBIN: CPT

## 2022-04-01 PROCEDURE — 74011000250 HC RX REV CODE- 250: Performed by: ORTHOPAEDIC SURGERY

## 2022-04-01 PROCEDURE — 97530 THERAPEUTIC ACTIVITIES: CPT

## 2022-04-01 PROCEDURE — 36415 COLL VENOUS BLD VENIPUNCTURE: CPT

## 2022-04-01 RX ORDER — CELECOXIB 200 MG/1
200 CAPSULE ORAL 2 TIMES DAILY
Qty: 60 CAPSULE | Refills: 0 | Status: SHIPPED | OUTPATIENT
Start: 2022-04-01 | End: 2022-05-01

## 2022-04-01 RX ORDER — OXYCODONE HYDROCHLORIDE 5 MG/1
2.5-5 TABLET ORAL
Qty: 42 TABLET | Refills: 0 | Status: SHIPPED | OUTPATIENT
Start: 2022-04-01 | End: 2022-04-08

## 2022-04-01 RX ADMIN — SODIUM CHLORIDE, PRESERVATIVE FREE 10 ML: 5 INJECTION INTRAVENOUS at 06:04

## 2022-04-01 RX ADMIN — OXYCODONE 5 MG: 5 TABLET ORAL at 00:56

## 2022-04-01 RX ADMIN — APIXABAN 2.5 MG: 2.5 TABLET, FILM COATED ORAL at 11:01

## 2022-04-01 RX ADMIN — POLYETHYLENE GLYCOL 3350 17 G: 17 POWDER, FOR SOLUTION ORAL at 11:03

## 2022-04-01 RX ADMIN — OXYCODONE 5 MG: 5 TABLET ORAL at 09:29

## 2022-04-01 RX ADMIN — ACETAMINOPHEN 500 MG: 500 TABLET ORAL at 06:04

## 2022-04-01 RX ADMIN — SENNOSIDES AND DOCUSATE SODIUM 1 TABLET: 50; 8.6 TABLET ORAL at 11:03

## 2022-04-01 RX ADMIN — PRAVASTATIN SODIUM 10 MG: 20 TABLET ORAL at 11:02

## 2022-04-01 RX ADMIN — OXYCODONE 5 MG: 5 TABLET ORAL at 12:29

## 2022-04-01 RX ADMIN — KETOROLAC TROMETHAMINE 15 MG: 30 INJECTION, SOLUTION INTRAMUSCULAR; INTRAVENOUS at 00:56

## 2022-04-01 RX ADMIN — KETOROLAC TROMETHAMINE 15 MG: 30 INJECTION, SOLUTION INTRAMUSCULAR; INTRAVENOUS at 06:04

## 2022-04-01 RX ADMIN — CEFAZOLIN SODIUM 2 G: 1 INJECTION, POWDER, FOR SOLUTION INTRAMUSCULAR; INTRAVENOUS at 00:55

## 2022-04-01 RX ADMIN — ACETAMINOPHEN 500 MG: 500 TABLET ORAL at 11:02

## 2022-04-01 NOTE — OP NOTES
[address]   OPERATIVE REPORT    Name:  Yessi Escalera  MR#:  [id number]  :  1955  ACCOUNT #:  [de-identified]  DATE OF SERVICE:  2022      PREOPERATIVE DIAGNOSIS:  Osteoarthritis of both knees. POSTOPERATIVE DIAGNOSIS:  Osteoarthritis of both knees. PROCEDURES PERFORMED:  1. Right total knee arthroplasty. 2.  Intra-articular corticosteroid injection, left knee joint. SURGEON:  Narendra Vieira MD    ASSISTANT:  Nils Goldberg, SA    ANESTHESIA:  Spinal sedation as well as adductor canal block. COMPLICATIONS:  None. SPECIMENS REMOVED:  None. IMPLANTS:  Components implanted:  [employer] Empowr 3-D size 8 femur, size 8- tibial tray with 10-mm polyethylene insert, and 32-mm patella. ESTIMATED BLOOD LOSS:  300 mL. INDICATIONS:  The patient is a 68-year-old female with progressive bilateral knee pain, right much worse than left, due to severe osteoarthritis of both knees, symptoms have progressed despite comprehensive conservative treatment and she presents for right total knee replacement. She will also undergo intra-articular corticosteroid injection of the left knee intraoperatively, to temporize the left knee symptoms during right knee rehab. Risks, benefits, alternatives of procedures were reviewed with her in detail and she desires to proceed. PROCEDURE:  Anesthesia team placed an adductor canal block in the right thigh before taking the patient to the operating room and they also placed a spinal.  Preoperative IV antibiotics were administered. Padded pneumatic tourniquet was placed around the right upper thigh. Left lateral knee was prepped in the usual sterile fashion and injected with 40 mg of Depo-Medrol and 5 mL of sterile injectable saline under sterile conditions. Band-Aid was applied. Right lower extremity was prepped and draped in the usual sterile fashion. Tourniquet was inflated to 300.   Through a midline anterior knee incision, I performed a medial parapatellar arthrotomy. Progressive medial release was performed with solitary exposure and soft tissue balance throughout the procedure. As soon as the knee was flexed, the tourniquet was released. 10-mm distal femoral resection was performed using OrthAlign to navigate a neutral cut relative to mechanical axis of the femur. Femoral notch osteophytes and PCL were excised. Minimal additional medial release was required to produce a symmetrical extension gap with a 10-mm spacer block. Knee was placed in 90 degrees of flexion and gap  was inserted. Soft tissue tension was applied and block was adjusted to produce a 10-mm flexion space. Femoral rotation was drilled and the femur was sized and prepared for a size 8 component utilizing appropriate jig. Remaining posterior osteophytes were removed from the femur and subperiosteal posterior capsular release was performed. Patella was prepared for 32-mm component and tracked centrally using no thumbs technique. Tourniquet was re-inflated. Trials were removed and tibial preparation was completed. Bony surfaces were copiously irrigated by pulse lavage and dried before the real components were cemented into place using antibiotic-impregnated cement. Excess cement was removed. The knee was reduced in full extension with the real insert in place during cement setup. Periarticular soft tissues were injected with solution containing 0.5% ropivacaine with epinephrine as well as clonidine and Toradol. Tourniquet was released. Wound was irrigated. Arthrotomy was closed with a combination of heavy Vicryl sutures and a running #2 Stratafix suture. Skin and subcutaneous layers were closed in layered fashion with Vicryl and a running Monocryl subcuticular stitch. The wound was dressed with Dermabond and Aquacel occlusive dressing as well as sterile compressive dressing.   The patient's bladder was decompressed with straight catheterization before she was transported to the postanesthesia care unit in stable condition. All counts were correct at the end of the procedure.         Irvin Pardo MD      [id number]  D:  03/31/2022 18:09  T:  04/01/2022 0:17  JOB #:  [id number]  CC:  Washington Downey MD

## 2022-04-01 NOTE — PROGRESS NOTES
Problem: Mobility Impaired (Adult and Pediatric)  Goal: *Acute Goals and Plan of Care (Insert Text)  Description: FUNCTIONAL STATUS PRIOR TO ADMISSION: Patient was independent and active without use of DME.    HOME SUPPORT PRIOR TO ADMISSION: The patient lived with 79 yo mother but did not require assist.  Pt's niece is at her home taking care of her mother. At discharge pt has hired paid caregivers to assist in home. Physical Therapy Goals  Initiated 3/31/2022    1. Patient will move from supine to sit and sit to supine  and scoot up and down in bed with modified independence within 4 days. 2. Patient will perform sit to stand with modified independence within 4 days. 3. Patient will ambulate with modified independence for > 150 feet with the least restrictive device within 4 days. 4. Patient will ascend/descend 4 stairs with one handrail(s) with modified independence within 4 days. 5. Patient will perform home exercise program per protocol with supervision/set-up within 4 days. 6. Patient will demonstrate AROM 0-90 degrees in operative joint within 4 days. Outcome: Progressing Towards Goal   PHYSICAL THERAPY NOTE  Patient: Ayana Gaspar (25 y.o. female)  Date: 4/1/2022  Primary Diagnosis: Primary osteoarthritis of right knee [M17.11]  Primary osteoarthritis of knees, bilateral [M17.0]  Procedure(s) (LRB):  RIGHT TOTAL KNEE ARTHROPLASTY AND LEFT KNEE CORTOSONE INJECTION  (SPINAL WBLOCK W/IV SEDATION) (Right) 1 Day Post-Op   Precautions: WBAT,Fall    Seema ALEXIA Narendra was seen for morning PT session. She is walking 150 feet with the RW and SBA/Supervision. She is also demonstrating bed mobility and sit<>stand w/ Supervision-Mod I. Reports 8/10 pain  during gait however in NAD about it. Demonstrates step through, reciprocal gait pattern w/ gait. Patient was instructed in stair management and able to negotiate 4 stairs using both rails and SBA.  She reports having a chair lift at home she is able to access and utilize. Reviewed activity recommendations and restrictions with patient. Seema Mackey is clear for discharge home w/ HHPT from a mobility standpoint. RN is aware. Morning session functional mobility:  Bed Mobility:     Supine to Sit: Modified independent  Sit to Supine: Supervision     Transfers:  Sit to Stand: Supervision  Stand to Sit: Supervision                       Balance:   Sitting: Intact  Standing: Intact; With support (RW)  Standing - Static: Constant support;Good  Standing - Dynamic : Constant support;Good  Ambulation/Gait Training:  Distance (ft): 150 Feet (ft)  Assistive Device: Gait belt;Walker, rolling  Ambulation - Level of Assistance: Supervision;Stand-by assistance        Gait Abnormalities: Antalgic  Right Side Weight Bearing: As tolerated  Left Side Weight Bearing: Full  Base of Support: Widened     Speed/Toya: Pace decreased (<100 feet/min)  Step Length: Left shortened;Right shortened        Interventions: Safety awareness training        Stairs:  Number of Stairs Trained: 4  Stairs - Level of Assistance: Stand-by assistance  Rail Use: Both    Thank you,  Traci Elam,PTA   Time Calculation: 34 mins

## 2022-04-01 NOTE — PROGRESS NOTES
Pain well controlled. No cp/sob.  Up with PT yest.    Visit Vitals  /78 (BP 1 Location: Right upper arm, BP Patient Position: At rest)   Pulse (!) 58   Temp 97 °F (36.1 °C)   Resp 16   Ht 5' 4\" (1.626 m)   Wt 232 lb (105.2 kg)   SpO2 97%   BMI 39.82 kg/m²       Alert  R knee dressing dry  Calves soft NT  Motor 5/5  Pulses symmetrical    Recent Results (from the past 12 hour(s))   METABOLIC PANEL, BASIC    Collection Time: 04/01/22  6:12 AM   Result Value Ref Range    Sodium 139 136 - 145 mmol/L    Potassium 4.5 3.5 - 5.1 mmol/L    Chloride 111 (H) 97 - 108 mmol/L    CO2 26 21 - 32 mmol/L    Anion gap 2 (L) 5 - 15 mmol/L    Glucose 106 (H) 65 - 100 mg/dL    BUN 16 6 - 20 MG/DL    Creatinine 0.75 0.55 - 1.02 MG/DL    BUN/Creatinine ratio 21 (H) 12 - 20      GFR est AA >60 >60 ml/min/1.73m2    GFR est non-AA >60 >60 ml/min/1.73m2    Calcium 8.5 8.5 - 10.1 MG/DL   HEMOGLOBIN    Collection Time: 04/01/22  6:12 AM   Result Value Ref Range    HGB 9.8 (L) 11.5 - 16.0 g/dL         POD 1 R TKA; acute postop blood loss anemia (expected)  -PT  -eliquis  -pain control  -home today    Jackie Westbrook MD

## 2022-04-01 NOTE — PROGRESS NOTES
Ortho NP Note    POD# 1  s/p RIGHT TOTAL KNEE ARTHROPLASTY AND LEFT KNEE CORTOSONE INJECTION  ([address] W/IV SEDATION)   Pt seen with Dr Vivian Gong    Pt resting in bed. Just returned from [facility]. Reports pain with ambulation was 10/10 however now that she has returned to bed states she is overall comfortable. VSS Afebrile. Visit Vitals  /78 (BP 1 Location: Right upper arm, BP Patient Position: At rest)   Pulse (!) 58   Temp 97 °F (36.1 °C)   Resp 16   Ht 5' 4\" (1.626 m)   Wt 105.2 kg (232 lb)   SpO2 97%   BMI 39.82 kg/m²       Voiding status: spontaneous void  Output (mL)  Last Bowel Movement Date: 03/29/22 (03/31/22 4803)  Unmeasurable Output  Urine Occurrence(s): 2 (04/01/22 0200)      Labs    Lab Results   Component Value Date/Time    HGB 9.8 (L) 04/01/2022 06:12 AM      Lab Results   Component Value Date/Time    INR 1.0 03/24/2022 08:15 AM      Lab Results   Component Value Date/Time    Sodium 139 04/01/2022 06:12 AM    Potassium 4.5 04/01/2022 06:12 AM    Chloride 111 (H) 04/01/2022 06:12 AM    CO2 26 04/01/2022 06:12 AM    Glucose 106 (H) 04/01/2022 06:12 AM    BUN 16 04/01/2022 06:12 AM    Creatinine 0.75 04/01/2022 06:12 AM    Calcium 8.5 04/01/2022 06:12 AM     Recent Glucose Results:   Lab Results   Component Value Date/Time     (H) 04/01/2022 06:12 AM    GLUCPOC 88 03/31/2022 08:40 AM           Body mass index is 39.82 kg/m². : A BMI > 30 is classified as obesity and > 40 is classified as morbid obesity. ACE wrap still in place to ORLY SAEED c.d.i  Cryotherapy in place over incision  Calves soft and supple; No pain with passive stretch  Bilateral LEs warm, dry. 2+ DP pulses. Sensation and motor intact - PF/DF/EHL intact 5/5  Foot Pumps for mechanical DVT proph while in bed     PLAN:  1) PT: BID WBAT  2) Anticoagulation:  Eliquis 2.5 mg PO BID for DVT Prophylaxis. Encouraged early mobilization, bed exercises, and SCD use.   3) Pain - Multimodal approach including cryotherapy, scheduled Tylenol & Toradol with  PRN oxycodone & IV dilaudid depending on pain severity. For discharge, Celebrex, oxycodone, tylenol    4) Post operative anemia: Pre op Hgb at PAT: 12.4. EBL: 200. Today, 9.8. No active bleeding noted. Expected Acute blood loss post-op anemia. 5) Readniess for discharge: pt agreeable to use [address] with family member here to  medications today. Requesting same [address] as mother--CM aware. Discussed discharge including return precautions, medications ordered, wound care, and ongoing therapy--patient expressed understanding. [x] Vital Signs stable     [x] + Voiding    [x] Wound intact, drainage minimal    [x] Tolerating PO intake     [] Cleared by PT (OT if applicable)     [] Stair training completed (if applicable)    [] Independent / Contact Guard Assist (household distance)     [] Bed mobility     [] Car transfers     [] ADLs    [x] Adequate pain control on oral medication alone     Dispo pending PT clearance; plan for home with HH.       Monster Mims NP  Available via [employer]

## 2022-04-01 NOTE — DISCHARGE SUMMARY
Ortho Discharge Summary    Patient ID:  Liz Kennedy  [id number]  female  74 y.o.  1955    Admit date: 3/31/2022    Discharge date: 4/1/2022    Admitting Physician: Iron Duong MD     Consulting Physician(s):   Treatment Team: Attending Provider: Tonja Loera MD; Primary Nurse: Virginia López, RN; Primary Nurse: Rodolfo Giordano, RN; Physical Therapy Assistant: Traci Elam    Date of Surgery:   3/31/2022     Preoperative Diagnosis:  Primary osteoarthritis of right knee [M17.11]    Postoperative Diagnosis:   Primary osteoarthritis of right knee [M17.11]    Procedure(s):   RIGHT TOTAL KNEE ARTHROPLASTY AND LEFT KNEE CORTOSONE INJECTION  ([address] W/IV SEDATION)     Anesthesia Type:   Spinal     Surgeon: Tonja Loera MD                            HPI:  Pt is a 74 y.o. female who has a history of Primary osteoarthritis of right knee [M17.11]  with pain and limitations of activities of daily living who presents at this time for a right RIGHT TOTAL KNEE ARTHROPLASTY AND LEFT KNEE [address]  ([address] W/IV SEDATION) following the failure of conservative management. PMH:   Past Medical History:   Diagnosis Date    Arthritis     BOTH HIPS PAINFUL    Chronic pain     Diabetes ([address]     PRE    Difficulty sleeping     Frequent urination     GERD (gastroesophageal reflux disease)     High cholesterol     Spinal stenosis, lumbar        Body mass index is 39.82 kg/m². : A BMI > 30 is classified as obesity and > 40 is classified as morbid obesity. Medications upon admission :   Prior to Admission Medications   Prescriptions Last Dose Informant Patient Reported? Taking?   acetaminophen (TYLENOL) 500 mg tablet 3/30/2022 at Unknown time  Yes Yes   Sig: Take  by mouth every six (6) hours as needed for Pain. albuterol (PROVENTIL HFA, VENTOLIN HFA, PROAIR HFA) 90 mcg/actuation inhaler Not Taking at Unknown time  Yes No   Sig: Take  by inhalation as needed for Wheezing.    Patient not taking: Reported on 3/31/2022   cholecalciferol (VITAMIN D3) 1,000 unit tablet 3/24/2022 at Unknown time  Yes Yes   Sig: cholecalciferol (vitamin D3) 1,000 unit (25 mcg) tablet   diclofenac (VOLTAREN) 1 % gel   No No   Sig: Apply 2 g to affected area four (4) times daily. diclofenac EC (VOLTAREN) 75 mg EC tablet 3/24/2022 at Unknown time  Yes Yes   Sig: Take 75 mg by mouth two (2) times a day. diclofenac EC (VOLTAREN) 75 mg EC tablet   No No   Sig: Take 1 Tablet by mouth two (2) times a day. fluticasone furoate (Arnuity Ellipta) 50 mcg/actuation dsdv Not Taking at Unknown time  Yes No   Sig: Take  by inhalation. Patient not taking: Reported on 3/31/2022   ibuprofen (ADVIL) 200 mg tablet   Yes No   Sig: Take 200 mg by mouth. Patient not taking: Reported on 1/5/2022   metFORMIN ER (GLUCOPHAGE XR) 500 mg tablet 3/29/2022  Yes No   Sig: Take 1,000 mg by mouth daily. pravastatin (PRAVACHOL) 10 mg tablet 3/24/2022 at Unknown time  Yes Yes   Sig: Take 10 mg by mouth.   promethazine-codeine (PHENERGAN with CODEINE) 6.25-10 mg/5 mL syrup Not Taking at Unknown time  Yes No   Sig: Take 5 mL by mouth four (4) times daily as needed for Cough. Patient not taking: Reported on 3/31/2022      Facility-Administered Medications: None        Allergies: Allergies   Allergen Reactions    Aspirin Palpitations    Codeine-Guaifenesin Other (comments)     CAUSES HEART PALPITATIONS AT NIGHT    Milk Prot-Turm-Pepper-Pineappl Diarrhea     CAN EAT ICE CREAM & CHEESE & BUTTER. DRINKS LACTAID    Minocycline Other (comments)     Told by MD she is allergic. She does not know the reaction. Hospital Course: The patient underwent surgery. Complications:  None; patient tolerated the procedure well. Was taken to the PACU in stable condition and then transferred to the ortho floor. Mobilized with therapy on day of surgery with clearance on POD 1 for discharge to home with home health.       Perioperative Antibiotics:  Ancef Postoperative Pain Management:  Oxycodone & Tylenol with Celebrex at discharge    DVT Prophylaxis: Eliquis 2.5 mg PO BID     Postoperative transfusions:    Number of units banked PRBCs =   none     Post Op complications: none    Hemoglobin at discharge:    Lab Results   Component Value Date/Time    HGB 9.8 (L) 04/01/2022 06:12 AM    INR 1.0 03/24/2022 08:15 AM       Aquacel dressing remained in place - clean, dry and intact. No significant erythema or swelling. Wound appears to be healing without any evidence of infection. Neurovascular exam found to be within normal limits. Physical Therapy started following surgery and participated in bed mobility, transfers and ambulation. Gait:  Gait  Base of Support: Widened  Speed/Toya: Pace decreased (<100 feet/min)  Step Length: Left shortened,Right shortened  Swing Pattern: Right asymmetrical  Stance: Right decreased  Gait Abnormalities: Antalgic  Ambulation - Level of Assistance: Supervision,Stand-by assistance  Distance (ft): 150 Feet (ft)  Assistive Device: Gait belt,Walker, rolling  Rail Use: Both  Stairs - Level of Assistance: Stand-by assistance  Number of Stairs Trained: 4  Interventions: Safety awareness training            Interventions: Safety awareness training      Discharged to: Home. Condition on Discharge:   good    Discharge instructions:  - Anticoagulate with Eliquis 2.5 mg PO BID  - Take pain medications as prescribed  - Resume pre hospital diet      - Discharge activity: activity as tolerated  - Ambulate with assistive device as needed. - Weight bearing status as tolerated  - Wound Care Keep wound clean and dry. See discharge instruction sheet. -DISCHARGE MEDICATION LIST     Current Discharge Medication List      START taking these medications    Details   celecoxib (CELEBREX) 200 mg capsule Take 1 Capsule by mouth two (2) times a day for 30 days.   Qty: 60 Capsule, Refills: 0  Start date: 4/1/2022, End date: 5/1/2022 Associated Diagnoses: S/P total knee replacement, right      oxyCODONE IR (ROXICODONE) 5 mg immediate release tablet Take 0.5-1 Tablets by mouth every four (4) hours as needed for Pain for up to 7 days. Max Daily Amount: 30 mg.  Qty: 42 Tablet, Refills: 0  Start date: 4/1/2022, End date: 4/8/2022    Associated Diagnoses: S/P total knee replacement, right      apixaban (ELIQUIS) 2.5 mg tablet Take 1 Tablet by mouth two (2) times a day for 30 days. Qty: 60 Tablet, Refills: 0  Start date: 4/1/2022, End date: 5/1/2022      polyethylene glycol (MIRALAX) 17 gram packet Take 1 Packet by mouth daily for 14 days. Qty: 14 Packet, Refills: 0  Start date: 4/1/2022, End date: 4/15/2022      senna-docusate (PERICOLACE) 8.6-50 mg per tablet Take 1 Tablet by mouth two (2) times a day for 14 days. Qty: 28 Tablet, Refills: 0  Start date: 3/31/2022, End date: 4/14/2022         CONTINUE these medications which have CHANGED    Details   acetaminophen (TYLENOL) 500 mg tablet Take 1 Tablet by mouth every four (4) hours (while awake) for 14 days. Qty: 70 Tablet, Refills: 0  Start date: 3/31/2022, End date: 4/14/2022         CONTINUE these medications which have NOT CHANGED    Details   cholecalciferol (VITAMIN D3) 1,000 unit tablet cholecalciferol (vitamin D3) 1,000 unit (25 mcg) tablet      pravastatin (PRAVACHOL) 10 mg tablet Take 10 mg by mouth. Refills: 1      fluticasone furoate (Arnuity Ellipta) 50 mcg/actuation dsdv Take  by inhalation. albuterol (PROVENTIL HFA, VENTOLIN HFA, PROAIR HFA) 90 mcg/actuation inhaler Take  by inhalation as needed for Wheezing. metFORMIN ER (GLUCOPHAGE XR) 500 mg tablet Take 1,000 mg by mouth daily.          STOP taking these medications       diclofenac EC (VOLTAREN) 75 mg EC tablet Comments:   Reason for Stopping:         promethazine-codeine (PHENERGAN with CODEINE) 6.25-10 mg/5 mL syrup Comments:   Reason for Stopping:         diclofenac EC (VOLTAREN) 75 mg EC tablet Comments:   Reason for Stopping:         diclofenac (VOLTAREN) 1 % gel Comments:   Reason for Stopping:         ibuprofen (ADVIL) 200 mg tablet Comments:   Reason for Stopping:            per medical continuation form      -Follow up in office in 3-4 weeks      Signed:  Napoleon Babinski, NP  Orthopaedic Nurse Practitioner    4/1/2022  12:19 PM

## 2022-04-01 NOTE — PROGRESS NOTES
Patient was discharged before CM was able to complete CM assessment and arrange home health. CM called patient at home, and offered freedom of choice for home health. Patient prefers [facility] [address]. Referral was sent and accepted. Patient confirmed she has a rolling walker.        AMY Servin/CRM

## 2022-04-04 ENCOUNTER — PATIENT OUTREACH (OUTPATIENT)
Dept: CASE MANAGEMENT | Age: 67
End: 2022-04-04

## 2022-04-04 NOTE — PROGRESS NOTES
Post Discharge Follow-up contact after Joint Replacement    Patient discharged on 4/1/22  By  Nguyen Gee   following  right knee Arthroplasty. Spoke with patient today, who reports that \" the swelling concerns me, but I have been elevated and icing just as the therapist told me to do. \"  Denies Fever, Shortness of Breath or Chest Pain. Home Health has visited. Patient also reports:  Surgical dressing clean, dry, intact  Calf is non-tender, operative extremity has moderate swelling. Pain is well managed. Discussed use of ice & elevation. Patient is progressing with therapy and is exercising independently. Taking Eliquis for anticoagulation, oxycodone for pain. Patient is not experiencing symptoms of constipation & urinating without difficulty. Discussed side effects of anticoagulants & pain medications (bleeding/bruising, constipation, lightheaded/dizziness)  Follow up appointment is scheduled; but patient states plan to schedule. Discussed calling surgeon Dr Mell Bragg  for drainage, bleeding, swelling in operative extremity, fever or pain. Discussed calling PCP Dr Jayjay Stokes with other medical issues.

## 2022-04-04 NOTE — NURSE NAVIGATOR
[address]  AR Orthopaedic Pathway Handoff     FROM:                                TO: At [facility]                                                      ([facility] or Facility name)  Hilaria Foster [address]  [address] [place] [address]  Dept: [address]: [phone]                                      Room#:  558/01                                                       Nurse Navigator:  Carlos Alberto Green RN         SITUATION      ASAScore: ASA 3 - Patient with moderate systemic disease with functional limitations    Admitted:  3/31/2022  Hospital Day: 2      Attending Provider:  No att. providers found     Consultations:  None    PCP:  Moses Blackwood MD   [phone]     Admitting Dx:  Primary osteoarthritis of right knee [M17.11]  Primary osteoarthritis of knees, bilateral [M17.0]       Active Problems:    Primary osteoarthritis of knees, bilateral (3/31/2022)      4 Days Post-Op of   Procedure(s):  RIGHT TOTAL KNEE ARTHROPLASTY AND LEFT KNEE Marzette Franki INJECTION  ([address] W/IV SEDATION)   BY: Yohan Lundy MD             ON: 3/31/2022                  Code Status: Prior             Advance Directive? Verified (Send w/patient)     Isolation:  There are currently no Active Isolations       MDRO: No current active infections    BACKGROUND     Allergies: Allergies   Allergen Reactions    Aspirin Palpitations    Codeine-Guaifenesin Other (comments)     CAUSES HEART PALPITATIONS AT NIGHT    Milk Prot-Turm-Pepper-Pineappl Diarrhea     CAN EAT ICE CREAM & CHEESE & BUTTER. DRINKS LACTAID    Minocycline Other (comments)     Told by MD she is allergic. She does not know the reaction.        Past Medical History:   Diagnosis Date    Arthritis     BOTH HIPS PAINFUL    Chronic pain     Diabetes ([address]     PRE    Difficulty sleeping     Frequent urination     GERD (gastroesophageal reflux disease)     High cholesterol     Spinal stenosis, lumbar Past Surgical History:   Procedure Laterality Date    HX HEENT  1980s    VOCAL CORD POLYP EXCISED    HX HIP REPLACEMENT Right 05/2012    HX ORTHOPAEDIC  1990s    FOOT SURGERY    HX ORTHOPAEDIC      back surgery    HX TONSILLECTOMY  CHILDHOOD       Prior to Admission Medications   Prescriptions Last Dose Informant Patient Reported? Taking?   acetaminophen (TYLENOL) 500 mg tablet 3/30/2022 at Unknown time  Yes No   Sig: Take  by mouth every six (6) hours as needed for Pain. albuterol (PROVENTIL HFA, VENTOLIN HFA, PROAIR HFA) 90 mcg/actuation inhaler Not Taking at Unknown time  Yes No   Sig: Take  by inhalation as needed for Wheezing. Patient not taking: Reported on 3/31/2022   cholecalciferol (VITAMIN D3) 1,000 unit tablet 3/24/2022 at Unknown time  Yes Yes   Sig: cholecalciferol (vitamin D3) 1,000 unit (25 mcg) tablet   diclofenac (VOLTAREN) 1 % gel   No No   Sig: Apply 2 g to affected area four (4) times daily. diclofenac EC (VOLTAREN) 75 mg EC tablet 3/24/2022 at Unknown time  Yes No   Sig: Take 75 mg by mouth two (2) times a day. diclofenac EC (VOLTAREN) 75 mg EC tablet   No No   Sig: Take 1 Tablet by mouth two (2) times a day. fluticasone furoate (Arnuity Ellipta) 50 mcg/actuation dsdv Not Taking at Unknown time  Yes No   Sig: Take  by inhalation. Patient not taking: Reported on 3/31/2022   ibuprofen (ADVIL) 200 mg tablet   Yes No   Sig: Take 200 mg by mouth. Patient not taking: Reported on 1/5/2022   metFORMIN ER (GLUCOPHAGE XR) 500 mg tablet 3/29/2022  Yes No   Sig: Take 1,000 mg by mouth daily. pravastatin (PRAVACHOL) 10 mg tablet 3/24/2022 at Unknown time  Yes Yes   Sig: Take 10 mg by mouth.   promethazine-codeine (PHENERGAN with CODEINE) 6.25-10 mg/5 mL syrup Not Taking at Unknown time  Yes No   Sig: Take 5 mL by mouth four (4) times daily as needed for Cough.    Patient not taking: Reported on 3/31/2022      Facility-Administered Medications: None       Vaccinations: Immunization History   Administered Date(s) Administered    COVID-19, Pfizer Purple top, DILUTE for use, 12+ yrs, 30mcg/0.3mL dose 01/30/2021, 02/27/2021, 09/28/2021    Influenza Vaccine 09/01/2012         ASSESSMENT   Age: 74 y.o. Gender: female        Height: Height: 5' 4\" (162.6 cm)                    Weight:Weight: 105.2 kg (232 lb)     No data found. Active Orders   There are no active orders of the following types: Diet. Orientation: Orientation Level: Oriented X4    Active Lines/Drains:  (Peg Tube / Gloria / CL or S/L?):no    Urinary Status: Voiding      Last BM: Last Bowel Movement Date: 04/12/22     Skin Integrity: Incision (comment)             Mobility: Slightly limited   Weight Bearing Status: WBAT (Weight Bearing as Tolerated)      Gait Training  Assistive Device: Gait belt,Walker, rolling  Ambulation - Level of Assistance: Supervision,Stand-by assistance  Distance (ft): 150 Feet (ft)  Stairs - Level of Assistance: Stand-by assistance  Number of Stairs Trained: 4  Rail Use: Both  Interventions: Safety awareness training     On Anticoagulation?  YES  Eliquis                                         Pain Medications given:  oxycodone                                   Lab Results   Component Value Date/Time    Glucose 106 (H) 04/01/2022 06:12 AM    Hemoglobin A1c 5.4 03/24/2022 08:15 AM    INR 1.0 03/24/2022 08:15 AM    INR 1.0 03/18/2015 10:57 AM    HGB 9.8 (L) 04/01/2022 06:12 AM    HGB 12.4 03/24/2022 08:15 AM    HGB 12.3 04/08/2015 04:55 AM    HGB 11.4 (L) 04/07/2015 04:25 AM    Hemoglobin A1c, External 5.6 11/12/2019 12:00 AM       Readmission Risks:  Score:         RECOMMENDATION     See After Visit Summary (AVS) for:  · Discharge instructions  · After [address]   · Medication Reconciliation          [facility] Orthopaedic Nurse Navigator  FABIO Arteaga, RN-BC       Office  [phone]  Cell      [phone]  Fax      [phone]  [email or web address] E Scale

## 2022-04-13 DIAGNOSIS — Z96.651 STATUS POST RIGHT KNEE REPLACEMENT: Primary | ICD-10-CM

## 2022-04-13 RX ORDER — OXYCODONE HYDROCHLORIDE 5 MG/1
2.5-5 TABLET ORAL
Qty: 42 TABLET | Refills: 0 | Status: SHIPPED | OUTPATIENT
Start: 2022-04-13 | End: 2022-04-20

## 2022-04-22 ENCOUNTER — OFFICE VISIT (OUTPATIENT)
Dept: ORTHOPEDIC SURGERY | Age: 67
End: 2022-04-22
Payer: MEDICARE

## 2022-04-22 DIAGNOSIS — Z09 SURGERY FOLLOW-UP: ICD-10-CM

## 2022-04-22 DIAGNOSIS — Z96.651 HISTORY OF TOTAL KNEE ARTHROPLASTY, RIGHT: Primary | ICD-10-CM

## 2022-04-22 PROCEDURE — 99024 POSTOP FOLLOW-UP VISIT: CPT | Performed by: PHYSICIAN ASSISTANT

## 2022-04-22 NOTE — LETTER
4/22/2022    Patient: Tiff Riggs   YOB: 1955   Date of Visit: 4/22/2022     Juvencio Faith MD  [facility] [address] [address]  Via Fax: [phone]    Dear Juvencio Faith MD,      Thank you for referring Ms. Seema Mackey to [facility] for evaluation. My notes for this consultation are attached. If you have questions, please do not hesitate to call me. I look forward to following your patient along with you.       Sincerely,    Milan Alvarado PA-C

## 2022-04-22 NOTE — PROGRESS NOTES
Seema Mackey (: 1955) is a 74 y.o. female, patient, here for evaluation of the following chief complaint(s):  Knee Pain (total knee arthroplasty)       SUBJECTIVE/OBJECTIVE:  Seema Mackey presents today for first postop visit status post right total knee arthroplasty. She is working with home health physical therapy and is scheduled through next week. She is still taking oxycodone and Tylenol before bed. On Eliquis as directed for DVT prophylaxis. PHYSICAL EXAM:  Vitals: There were no vitals taken for this visit. There is no height or weight on file to calculate BMI. 74y.o. year old F in no acute distress. Ambulates with a limp on the right, cane in the ipsilateral hand that we corrected to the contralateral hand. She feels a little bit more unsteady with a cane on the opposite side but will try to use it that way to normalize her gait pattern. Right knee neutral alignment. Mild to moderate residual effusion. Well-healing, well approximated anterior incision with some skin glue and eschar remaining. No surrounding warmth, erythema, drainage. Range of motion 090/95, preop 01 20. Motor 5/5. No distal edema. IMAGING:  Radiographs: XR Results (most recent):  Results from Appointment encounter on 22    XR KNEE RT 3 V    Narrative  3 views of the right knee today including AP, lateral and sunrise using digital radiography demonstrate satisfactory position and alignment of cemented cruciate substituting components. No evidence of loosening. Patella tracks centrally. ASSESSMENT/PLAN:  1. History of total knee arthroplasty, right  -     XR KNEE RT 3 V; Future  -     REFERRAL TO PHYSICAL THERAPY  2. Surgery follow-up    First postop visit status post right total knee arthroplasty. Transition outpatient physical therapy. We discussed her concerns regarding pain at night. Continue Eliquis prescription until its finished.   After that, she can resume aspirin and restart anti-inflammatories as needed. Follow-up in 4 weeks for clinical recheck, sooner if needed. Return in about 4 weeks (around 5/20/2022) for POV #2 with Dr. Vivian Gong. Review Of Systems     Patient denies any recent fever, chills, nausea, vomiting, chest pain, or shortness of breath. Allergies   Allergen Reactions    Aspirin Palpitations    Codeine-Guaifenesin Other (comments)     CAUSES HEART PALPITATIONS AT NIGHT    Milk Prot-Turm-Pepper-Pineappl Diarrhea     CAN EAT ICE CREAM & CHEESE & BUTTER. DRINKS LACTAID    Minocycline Other (comments)     Told by MD she is allergic. She does not know the reaction. Current Outpatient Medications   Medication Sig    celecoxib (CELEBREX) 200 mg capsule Take 1 Capsule by mouth two (2) times a day for 30 days.  apixaban (ELIQUIS) 2.5 mg tablet Take 1 Tablet by mouth two (2) times a day for 30 days.  metFORMIN ER (GLUCOPHAGE XR) 500 mg tablet Take 1,000 mg by mouth daily.  cholecalciferol (VITAMIN D3) 1,000 unit tablet cholecalciferol (vitamin D3) 1,000 unit (25 mcg) tablet    pravastatin (PRAVACHOL) 10 mg tablet Take 10 mg by mouth. No current facility-administered medications for this visit.        Past Medical History:   Diagnosis Date    Arthritis     BOTH HIPS PAINFUL    Chronic pain     Diabetes (HCC)     PRE    Difficulty sleeping     Frequent urination     GERD (gastroesophageal reflux disease)     High cholesterol     Spinal stenosis, lumbar         Past Surgical History:   Procedure Laterality Date    HX HEENT  1980s    VOCAL CORD POLYP EXCISED    HX HIP REPLACEMENT Right 05/2012    HX ORTHOPAEDIC  1990s    FOOT SURGERY    HX ORTHOPAEDIC      back surgery    HX TONSILLECTOMY  CHILDHOOD       Family History   Problem Relation Age of Onset    Other Mother         CARDIAC ARREST DURING HIATAL HERNIA/REFLUX SURGERY;  ALSO, DOES NOT EVARISTO IV DYE    Hypertension Mother     Heart Disease Mother     Kidney Disease Mother    Monica Morelos Anesth Problems Mother         UNSURE WHAT THE PROBLEM IS. IT WAS AFTER SURGERY IN THE MID 80'S.  Hypertension Father     Dementia Father     Heart Disease Father     Elevated Lipids Father         Social History     Socioeconomic History    Marital status:      Spouse name: Not on file    Number of children: Not on file    Years of education: Not on file    Highest education level: Not on file   Occupational History    Not on file   Tobacco Use    Smoking status: Never Smoker    Smokeless tobacco: Never Used   Substance and Sexual Activity    Alcohol use: Yes     Comment: RARE ALCOHOL    Drug use: No    Sexual activity: Not Currently   Other Topics Concern    Not on file   Social History Narrative    Not on file     Social Determinants of Health     Financial Resource Strain:     Difficulty of Paying Living Expenses: Not on file   Food Insecurity:     Worried About Running Out of Food in the Last Year: Not on file    Deshawn of Food in the Last Year: Not on file   Transportation Needs:     Lack of Transportation (Medical): Not on file    Lack of Transportation (Non-Medical):  Not on file   Physical Activity:     Days of Exercise per Week: Not on file    Minutes of Exercise per Session: Not on file   Stress:     Feeling of Stress : Not on file   Social Connections:     Frequency of Communication with Friends and Family: Not on file    Frequency of Social Gatherings with Friends and Family: Not on file    Attends Synagogue Services: Not on file    Active Member of Clubs or Organizations: Not on file    Attends Club or Organization Meetings: Not on file    Marital Status: Not on file   Intimate Partner Violence:     Fear of Current or Ex-Partner: Not on file    Emotionally Abused: Not on file    Physically Abused: Not on file    Sexually Abused: Not on file   Housing Stability:     Unable to Pay for Housing in the Last Year: Not on file    Number of [place] in the Last Year: Not on file    Unstable Housing in the Last Year: Not on file         Orders Placed This Encounter    XR KNEE RT 3 V     4B     Standing Status:   Future     Number of Occurrences:   1     Standing Expiration Date:   5/22/2022    REFERRAL TO PHYSICAL THERAPY     Referral Priority:   Routine     Referral Type:   PT/OT/ST     Referral Reason:   Specialty Services Required     Number of Visits Requested:   [phone] Parisa Summers M.D. was available for immediate consultation as the supervising physician. An electronic signature was used to authenticate this note.   -- Moon De PA-C

## 2022-05-02 ENCOUNTER — OFFICE VISIT (OUTPATIENT)
Dept: ORTHOPEDIC SURGERY | Age: 67
End: 2022-05-02

## 2022-05-02 DIAGNOSIS — M25.561 ACUTE PAIN OF RIGHT KNEE: ICD-10-CM

## 2022-05-02 DIAGNOSIS — M17.11 PRIMARY OSTEOARTHRITIS OF RIGHT KNEE: Primary | ICD-10-CM

## 2022-05-02 DIAGNOSIS — Z96.651 STATUS POST RIGHT KNEE REPLACEMENT: Primary | ICD-10-CM

## 2022-05-02 PROCEDURE — 97110 THERAPEUTIC EXERCISES: CPT | Performed by: PHYSICAL THERAPIST

## 2022-05-02 PROCEDURE — 97161 PT EVAL LOW COMPLEX 20 MIN: CPT | Performed by: PHYSICAL THERAPIST

## 2022-05-02 NOTE — PROGRESS NOTES
Seema Mackey (: 1955) is a 74 y.o. Knee Pain       Patient Name: Elle Conklin  Date:2022  : 1955  [x]  Patient  Verified  Payor: Renato Kessler / Plan: [place] MED[place]ARE PART [place] & B / Product Type: Medicare /    Roxanne Gunter MD  Total Treatment Time (min): 60  Total Timed Codes (min): 45  1:1 Treatment Time ( [address] only): 39  Visit #:       Treatment Area: Right knee    ASSESSMENT/PLAN:  Below is the assessment and plan developed based on review of pertinent history, physical exam, labs, studies, and medications. Patient comes in today 1 month status post right total hip replacement and presents to physical therapy deficits including swelling, range of motion, strength, mobility, and flexibility. She will benefit from a physical therapy program to address above-mentioned deficits. Short-term goals: To become independent with today's prescribed home exercise program in 1 week. Long-term goals: To progress with a physical therapy program to the patient demonstrates functional right knee range of motion and strength allowing her to negotiate going up and down stairs using alternate step pattern as well as tolerate walking distances of up to 1 mile reporting no pain or instability in the next 8 to 12 weeks. Patient will be seen twice a week for up to 20 visits  with focus on progressive restoration of range of motion and strength, balance, and functional mobility. Therapeutic applications will include but are not limited to:Manual therapy, joint mobilization, myofascial release, therapeutic exercises. Modalities including ultrasound and electric stimulation heat and ice. Kinesiotape and Griard taping for joint reeducation and approximation of tissue for neuromuscular reeducation. 1. Primary osteoarthritis of right knee  2. Acute pain of right knee      Return in about 3 days (around 2022). SUBJECTIVE:  HPI  Patient reports improvement since surgery.   She participated in 3 weeks of home PT. They were able to get her to 105 degrees of knee bend. She has been walking short distances throughout the day. Still has difficulty sleeping at night second to knee stiffness and discomfort. Patient is retired and lives with her 25-year-old mother. Long-term goals are to return to travel, swimming and normal daily activities. She does have a gym member ship. Medical history includes left knee DJD, right total hip replacement, and lumbar fusion. She is taking Tylenol for pain. She is finishing her Eliquis prescription. She has not been icing. See patient's medical chart for detailed list of current medications as well as significant past medical history. OBJECTIVE:  Evaluation(20 minutes)  Patient comes in today demonstrating slight antalgic gait using a cane in her left hand. Relative weakness with modified step down and step up testing on the right side. Relative difficulty with single-leg stance on the right side. Right knee: PROM measures 0 degrees extension and 110 degrees of flexion. Surgical incisions healing nicely with no signs of infection. Mild subdermal adhesion around surgical scar line. Patient has moderate effusion measuring 2.0 cm greater than contralateral side at mid patella. She has a weak quadriceps contraction. She is able to perform repetitive supine straight leg raise with some difficulty. Calf is nontender. Flexibility restriction to hamstring, gastroc, and quadriceps. Relative hypomobility to patellofemoral joint. Pulses intact throughout right lower extremity. She does have diminished sensation to light touch to her lateral knee. No extremity functional scale: 17/80      Exercise(mins 25)  Today's interventions we initiated exercises for range of motion, strengthening, flexibility, and gait training for patient perform at home on daily basis.   Today's exercises include recumbent bike, straight leg raise, standing marching, mini squatting, calf stretch, standing hip abduction, and step knee flexion stretching. An electronic signature was used to authenticate this note.   -- Talisha Johnson, PT

## 2022-05-05 ENCOUNTER — OFFICE VISIT (OUTPATIENT)
Dept: ORTHOPEDIC SURGERY | Age: 67
End: 2022-05-05
Payer: MEDICARE

## 2022-05-05 DIAGNOSIS — M25.561 ACUTE PAIN OF RIGHT KNEE: ICD-10-CM

## 2022-05-05 DIAGNOSIS — M17.11 PRIMARY OSTEOARTHRITIS OF RIGHT KNEE: Primary | ICD-10-CM

## 2022-05-05 PROCEDURE — 97110 THERAPEUTIC EXERCISES: CPT

## 2022-05-05 NOTE — PROGRESS NOTES
Seema Mackey (: 1955) is a 74 y.o. Knee Pain       Patient Name: Cindy Gray  Date:2022  : 1955  [x]  Patient  Verified  Payor: [place] MEDICARE / Plan: [place] MEDICARE PART A & B / Product Type: Medicare /    No ref. provider found  Total Treatment Time (min): 60  Total Timed Codes (min): 45  1:1 Treatment Time ( only): 39  Visit #: 2 of 25      Treatment Area: Right knee    ASSESSMENT/PLAN:  Below is the assessment and plan developed based on review of pertinent history, physical exam, labs, studies, and medications. Patient is (I) with HEP prescribed at initial evaluation. Continues to have pain that wakes her up at night. Tenderness to palpation along medial and lateral joint lines and increased tension on hamstring insertion. Will work on flexibility to improve muscle extensibility in order to decrease pain and return to swimming and ambulation endurance. Encouraged patient to start a walking program in order to increase endurance, patient verbalized understanding to start slow an will follow up next session. 1. Primary osteoarthritis of right knee  2. Acute pain of right knee      Return in about 4 days (around 2022) for continued therapy for knee. SUBJECTIVE:  HPI  Stated that she is getting very little sleep due to discomfort. Reports trying a body pillow and knee pillow but nothing is helping. She reports increased swelling in her ankle. Reports having ankle surgery in . Stated that she was able to bike at the gym yesterday all the way around.      OBJECTIVE:      ROM: Not assessed       Exercise(mins 25)    PT Exercise Log         Activity/Exercise Date  22     Activity/Exercise  All exercises were supervised with verbal and tactile cues provided were necessary to perform the exercises with 100% accuracy       Bike [x]       Slant [x]    Shuttle heel raise     [x]    Shuttle quad     []    TKE   []    Single-leg Raise   [x]    Long arc quad   [] [place]   [x]    Treadmill   []    Cup Walk   []    Standing Abduction    [x]      Straight leg raise []        Quad stretch []    Stools scoot []    Balance board []    Step downs   []      Mini Squat [x]    Bungee walk   []    Fitter   []    Elliptical     []      Knee flexion stretch                    [x]                              []                            []       -- Co-treated by SEJAL Barrios

## 2022-05-05 NOTE — Clinical Note
Charges Entered Within the Last 7 Days (Filtered by [facility] AMB DEP PROFILE CHARGE CAPTURE PREF LIST FILTER)      Description Code Dx Service Date Service Prov Modifiers Qty Status               PA THERAPEUTIC EXERCISES 04023 CPT®  05/05/2022 Amauri Rides, PTA GP 3 Pend    PA MANUAL THER TECH,1+REGIONS,EA 15 MIN 85725 CPT®  05/05/2022 Amauri Rides, PTA GP 1 Pend

## 2022-05-05 NOTE — Clinical Note
Charges Entered Within the Last 7 Days (Filtered by [facility] AMB DEP PROFILE CHARGE CAPTURE PREF LIST FILTER)      Description Code Dx Service Date Service Prov Modifiers Qty Status               SD THERAPEUTIC EXERCISES 14370 CPT®  05/05/2022 Britany Alert, PTA GP 3 Pend    SD MANUAL THER TECH,1+REGIONS,EA 15 MIN 34847 CPT®  05/05/2022 Britany Alert, PTA GP 1 Pend

## 2022-05-09 ENCOUNTER — OFFICE VISIT (OUTPATIENT)
Dept: ORTHOPEDIC SURGERY | Age: 67
End: 2022-05-09
Payer: MEDICARE

## 2022-05-09 DIAGNOSIS — M17.11 PRIMARY OSTEOARTHRITIS OF RIGHT KNEE: Primary | ICD-10-CM

## 2022-05-09 DIAGNOSIS — M25.561 ACUTE PAIN OF RIGHT KNEE: ICD-10-CM

## 2022-05-09 PROCEDURE — 97140 MANUAL THERAPY 1/> REGIONS: CPT

## 2022-05-09 PROCEDURE — 97110 THERAPEUTIC EXERCISES: CPT

## 2022-05-09 NOTE — Clinical Note
Charges Requiring Review      Description Code Dx Service Date Service Prov Modifiers Qty Status               82859 Manual Therapy ea 15min 1+ Region 39651 CPT®  05/09/2022 Alisha López, PTA GP 1 New    19611 Therapeutic Exercise w/ GP Modifier 11153 CPT®  05/09/2022 Alisha López, PTA GP 2 New

## 2022-05-09 NOTE — PROGRESS NOTES
Seema Mackey (: 1955) is a 74 y.o. Knee Pain       Patient Name: Rody Ray  Date:2022  : 1955  [x]  Patient  Verified  Payor: [place] / Plan: [place] MEDICARE PART A & B / Product Type: Medicare /    Nguyen Gee MD  Total Treatment Time (min): 60  Total Timed Codes (min): 45  1:1 Treatment Time ( [address] only): 39  Visit #: 3 of 25      Treatment Area: Right knee    ASSESSMENT/PLAN:  Below is the assessment and plan developed based on review of pertinent history, physical exam, labs, studies, and medications. 1. Primary osteoarthritis of right knee  2. Acute pain of right knee    Patient is progressing ambulation tolerance. Noted decreased quad strength while completing car transfer, requiring assistance from UE and momentum to swing leg out of vehicle. Will work on LE strength and balance in order to improve patient function for safe community ambulation. Return in about 2 days (around 2022) for continued therapy for knee. SUBJECTIVE:  Knee Pain      Patient stated that she is having trouble sleeping do to discomfort. Reports going walking this morning at the park and then going to the grocery store. She c/o feeling achey at times around the patella. She is having a hard time lifting her right leg to get out of her car. Stated that she feels unsteady when she is walking. OBJECTIVE:    Manual: (15 min)  Patella mobs; STM/ Myofascial release to IT band, posterior knee, patellofemoral;  PROM Knee flexion with patellar tracking and extension; Hamstring Stretch.     ROM: Not assessed       Exercise(mins 30)    PT Exercise Log         Activity/Exercise Date  22     Activity/Exercise  All exercises were supervised with verbal and tactile cues provided were necessary to perform the exercises with 100% accuracy       Bike [x]       Slant [x]    Shuttle heel raise     [x]    Shuttle quad     []    TKE   []    Single-leg Raise   [x]    Long arc quad   []    [facility] [x]    Treadmill   []    Cup Walk   []    Standing Abduction    [x]      Straight leg raise [x] 3x10       Quad stretch []    Stools scoot []    Balance board []    Seated Marches   [x] EOB 3x10 e     Sit to Stand [x] 2x15   Bungee walk   []    Hip Flexion S   [x]    IT band S off table     [x]      Knee flexion stretch                    [x]                              []                            []       -- Co-treated by SEJAL Jackson

## 2022-05-11 ENCOUNTER — OFFICE VISIT (OUTPATIENT)
Dept: ORTHOPEDIC SURGERY | Age: 67
End: 2022-05-11
Payer: MEDICARE

## 2022-05-11 DIAGNOSIS — M17.11 PRIMARY OSTEOARTHRITIS OF RIGHT KNEE: Primary | ICD-10-CM

## 2022-05-11 DIAGNOSIS — M25.561 ACUTE PAIN OF RIGHT KNEE: ICD-10-CM

## 2022-05-11 PROCEDURE — 97110 THERAPEUTIC EXERCISES: CPT | Performed by: PHYSICAL THERAPIST

## 2022-05-11 PROCEDURE — 97140 MANUAL THERAPY 1/> REGIONS: CPT | Performed by: PHYSICAL THERAPIST

## 2022-05-11 NOTE — PROGRESS NOTES
Seema Mackey (: 1955) is a 74 y.o. Knee Pain       Patient Name: Adan Armstrong  MWKR:[id number]  : 1955  [x]  Patient  Verified  Payor: VA MEDICARE / Plan: VA MEDICARE PART A & B / Product Type: Medicare /    No ref. provider found  Total Treatment Time (min): 60  Total Timed Codes (min): 45  1:1 Treatment Time ( only): 39  Visit #: 3 of 25      Treatment Area: Right knee    ASSESSMENT/PLAN:  Below is the assessment and plan developed based on review of pertinent history, physical exam, labs, studies, and medications. 1. Primary osteoarthritis of right knee  2. Acute pain of right knee    Improved functional right knee range of motion and strength with ADLs. She is now able to perform tasks such as washing her feet and more easily able to get out of her car. Continue with current plan of care and progress towards long-term goals. Return in about 5 days (around 2022). SUBJECTIVE:  Knee Pain      Doing better. She was able to wash her right foot in the shower for the first time in years. OBJECTIVE:    Manual: (15 min)  Patella mobs; STM/ Myofascial release to IT band, posterior knee, patellofemoral;  PROM Knee flexion with patellar tracking and extension; Hamstring Stretch.     ROM: -5/120      Exercise(mins 30)    PT Exercise Log         Activity/Exercise Date  22     Activity/Exercise  All exercises were supervised with verbal and tactile cues provided were necessary to perform the exercises with 100% accuracy       Bike [x]       Slant [x]    Shuttle heel raise     [x]    Shuttle quad     []    TKE   []    Single-leg Raise   [x]    Long arc quad   [x]    Marches   [x]    Treadmill   [x]    Cup Walk   []    Standing Abduction    [x]      Straight leg raise [x] 3x10       Quad stretch []    Stools scoot []    Balance board []    Seated Marches   [x] EOB 3x10 e     Sit to Stand [x] 2x15   Bungee walk   []    Hip Flexion S   [x]    IT band S off table     [x] Knee flexion stretch                    [x]                              []                            []

## 2022-05-16 ENCOUNTER — OFFICE VISIT (OUTPATIENT)
Dept: ORTHOPEDIC SURGERY | Age: 67
End: 2022-05-16
Payer: MEDICARE

## 2022-05-16 DIAGNOSIS — Z96.651 STATUS POST RIGHT KNEE REPLACEMENT: Primary | ICD-10-CM

## 2022-05-16 DIAGNOSIS — M25.561 ACUTE PAIN OF RIGHT KNEE: ICD-10-CM

## 2022-05-16 PROCEDURE — 97140 MANUAL THERAPY 1/> REGIONS: CPT

## 2022-05-16 PROCEDURE — 97110 THERAPEUTIC EXERCISES: CPT

## 2022-05-16 NOTE — Clinical Note
CAPTURE PREF LIST FILTER)      Description Code Dx Service Date Service Prov Modifiers Qty Status               IA MANUAL THER TECH,1+REGIONS,EA 15 MIN 41595 CPT®  05/16/2022 Keturah Renner PTA GP 1 Pend    IA THERAPEUTIC EXERCISES 21400 CPT®  05/16/2022 Keturah Renner PTA GP 2 Pend

## 2022-05-16 NOTE — PROGRESS NOTES
Seema Mackey (: 1955) is a 74 y.o. Knee Pain       Patient Name: Heather Pabon  WUEM:  : 1955  [x]  Patient  Verified  Payor: VA MEDICARE / Plan: VA MEDICARE PART A & B / Product Type: Medicare /    No ref. provider found  Total Treatment Time (min): 60  Total Timed Codes (min): 45  1:1 Treatment Time ( only): 39  Visit #: 5 of 25      Treatment Area: Right knee    ASSESSMENT/PLAN:  Below is the assessment and plan developed based on review of pertinent history, physical exam, labs, studies, and medications. 1. Status post right knee replacement  2. Acute pain of right knee    Patient is doing well improving quad strength. Continues to have increased knee pain during transitions after short periods of sitting. Tender to touch along medial knee joint line. Continue with current plan of care and progress towards long-term goals. Return in about 2 days (around 2022) for continue therapy for knee pain. SUBJECTIVE:  Knee Pain    Stated that she is doing much better. She does have pain on both knees when first standing after sitting 15+ min. Reports that her left knee is hurting more today. OBJECTIVE:    Manual: (15 min)  Patella mobs; STM/ Myofascial release to IT band, posterior knee, patellofemoral;  PROM Knee flexion with patellar tracking and extension; Hamstring Stretch.     ROM: Will take next session      Exercise(mins 30)    PT Exercise Log         Activity/Exercise Date  22     Activity/Exercise  All exercises were supervised with verbal and tactile cues provided were necessary to perform the exercises with 100% accuracy       Bike [x] Warmed up 1 min then moved bike closer      Slant [x]    Shuttle heel raise     [x]    Shuttle quad     []    TKE   []    Single-leg Raise   [x]    Long arc quad   [x] Increased weight to 5#   Marches   [x]    Treadmill   [x]    Cup Walk   []    Standing Abduction    [x]      Straight leg raise [x] 3x10       Quad stretch []    Stools scoot []    Balance board []    Seated Marches   [x] EOB 3x10 e     Sit to Stand [x] 2x15   Bungee walk   []    Hip Flexion S   [x]    IT band S off table     [x]      Knee flexion stretch                    [x]                              []                            []

## 2022-05-18 ENCOUNTER — OFFICE VISIT (OUTPATIENT)
Dept: ORTHOPEDIC SURGERY | Age: 67
End: 2022-05-18
Payer: MEDICARE

## 2022-05-18 DIAGNOSIS — M25.561 ACUTE PAIN OF RIGHT KNEE: Primary | ICD-10-CM

## 2022-05-18 DIAGNOSIS — M17.11 PRIMARY OSTEOARTHRITIS OF RIGHT KNEE: ICD-10-CM

## 2022-05-18 PROCEDURE — 97110 THERAPEUTIC EXERCISES: CPT | Performed by: PHYSICAL THERAPIST

## 2022-05-18 PROCEDURE — 97140 MANUAL THERAPY 1/> REGIONS: CPT | Performed by: PHYSICAL THERAPIST

## 2022-05-18 NOTE — PROGRESS NOTES
Seema Mackey (: 1955) is a 74 y.o. Knee Pain       Patient Name: Lyubov Lares  Date:2022  : 1955  [x]  Patient  Verified  Payor: Joana Huy / Plan: VA MEDICARE PART A & B / Product Type: Medicare /    Enrike Velez MD  Total Treatment Time (min): 60  Total Timed Codes (min): 60  1:1 Treatment Time ( [address] only): 60  Visit #: 6 of 25      Treatment Area: Right knee    ASSESSMENT/PLAN:  Below is the assessment and plan developed based on review of pertinent history, physical exam, labs, studies, and medications. 1. Acute pain of right knee  2. Primary osteoarthritis of right knee    Still some weakness and instability with eccentric quad work today. We will have her work on some modified stepdown exercises at home with her goal being able to descend stairs without handrail assistance. Continue with current plan of care and progress towards long-term goals. Return in about 6 days (around 2022) for continued therapy for knee. SUBJECTIVE:  Knee Pain      Full to walk a mile yesterday. She went to the gym and did the bike later that day. Her knees were fairly sore that night. OBJECTIVE:    Manual: (15 min)  Patella mobs; STM/ Myofascial release to IT band, VLO, and rectus femoris. Hamstring Stretch. Friction massage to proximal half of scar line.     ROM: -3/120      Exercise(mins 45)    PT Exercise Log         Activity/Exercise Date  22     Activity/Exercise  All exercises were supervised with verbal and tactile cues provided were necessary to perform the exercises with 100% accuracy       Bike [x]       Slant [x]    Shuttle heel raise     [x]    Shuttle quad     [x]    TKE   [x]    Single-leg Raise   []    Long arc quad   [x]    Marches   [x]    Treadmill   []    Cup Walk   []    Standing Abduction    [x]      Straight leg raise [x] 3x10       Quad stretch []    Stools scoot []    Balance board []    Seated Marches   [x] EOB 3x10 e     Sit to Stand [x] 2x15 Up downs   [x] 2 inches   Hip Flexion S   [x]    IT band S off table     []      Knee flexion stretch                    [x]            Step ups                  [x]     6 inches                       []

## 2022-05-19 NOTE — PROGRESS NOTES
Seema Mackey (: 1955) is a 74 y.o. female, patient, here for evaluation of the following chief complaint(s):  Surgical Follow-up (RTK #2 F/U)       SUBJECTIVE/OBJECTIVE:  Seema Mackey presents today for routine follow-up approximately 7 weeks out from right total knee replacement. Overall she is happy with her progress. Steady improvement in discomfort, swelling, knee range of motion, and overall mobility. However, continues to have gelling symptoms. PHYSICAL EXAM:  Vitals: Ht 5' 4\" (1.626 m)   Wt 232 lb (105.2 kg)   BMI 39.82 kg/m²   Body mass index is 39.82 kg/m². 74y.o. year old F, no distress. Right anterior knee incision is well healed. Mild residual local knee swelling and warmth. Range of motion is 0 to approximately 120 degrees. This is the same as preoperative range of motion. Trace distal edema. No calf tenderness. IMAGING:  Radiographs: No new images today. ASSESSMENT/PLAN:  1. Status post right knee replacement  -     meloxicam (Mobic) 15 mg tablet; Take 1 Tablet by mouth daily. , Normal, Disp-30 Tablet, R-0  2. Surgery follow-up    Satisfactory progress. We will try a short course of prescription anti-inflammatories for some of her gelling symptoms and aching at night. Continue outpatient therapy with transition to home exercise program over the next several weeks. Return in 10 months for routine 1 year postop x-ray right knee, sooner if needed for left knee osteoarthritis. No follow-ups on file. Review Of Systems  ROS     Positive for: Musculoskeletal    Last edited by Dirk Carrasco RN on 2022  8:35 AM. (History)         Patient denies any recent fever, chills, nausea, vomiting, chest pain, or shortness of breath.     Allergies   Allergen Reactions    Aspirin Palpitations    Codeine-Guaifenesin Other (comments)     CAUSES HEART PALPITATIONS AT NIGHT    Milk Prot-Turm-Pepper-Pineappl Diarrhea     CAN EAT ICE CREAM & CHEESE & BUTTER. DRINKS LACTAID    Minocycline Other (comments)     Told by MD she is allergic. She does not know the reaction. Current Outpatient Medications   Medication Sig    meloxicam (Mobic) 15 mg tablet Take 1 Tablet by mouth daily.  metFORMIN ER (GLUCOPHAGE XR) 500 mg tablet Take 1,000 mg by mouth daily.  cholecalciferol (VITAMIN D3) 1,000 unit tablet cholecalciferol (vitamin D3) 1,000 unit (25 mcg) tablet    pravastatin (PRAVACHOL) 10 mg tablet Take 10 mg by mouth. No current facility-administered medications for this visit. Past Medical History:   Diagnosis Date    Arthritis     BOTH HIPS PAINFUL    Chronic pain     Diabetes ([facility] [address]     PRE    Difficulty sleeping     Frequent urination     GERD (gastroesophageal reflux disease)     High cholesterol     Spinal stenosis, lumbar         Past Surgical History:   Procedure Laterality Date    HX HEENT  1980s    VOCAL CORD POLYP EXCISED    HX HIP REPLACEMENT Right 05/2012    HX ORTHOPAEDIC  1990s    FOOT SURGERY    HX ORTHOPAEDIC      back surgery    HX TONSILLECTOMY  CHILDHOOD       Family History   Problem Relation Age of Onset    Other Mother         CARDIAC ARREST DURING HIATAL HERNIA/REFLUX SURGERY;  ALSO, DOES NOT EVARISTO IV DYE    Hypertension Mother     Heart Disease Mother     Kidney Disease Mother     Anesth Problems Mother         UNSURE WHAT THE PROBLEM IS. IT WAS AFTER SURGERY IN THE MID 80'S.  Hypertension Father     Dementia Father     Heart Disease Father     Elevated Lipids Father         Social History     Socioeconomic History    Marital status:      Spouse name: Not on file    Number of children: Not on file    Years of education: Not on file    Highest education level: Not on file   Occupational History    Not on file   Tobacco Use    Smoking status: Never Smoker    Smokeless tobacco: Never Used   Substance and Sexual Activity    Alcohol use: Yes     Comment: RARE ALCOHOL    Drug use:  No  Sexual activity: Not Currently   Other Topics Concern    Not on file   Social History Narrative    Not on file     Social Determinants of Health     Financial Resource Strain:     Difficulty of Paying Living Expenses: Not on file   Food Insecurity:     Worried About Running Out of Food in the Last Year: Not on file    Deshawn of Food in the Last Year: Not on file   Transportation Needs:     Lack of Transportation (Medical): Not on file    Lack of Transportation (Non-Medical): Not on file   Physical Activity:     Days of Exercise per Week: Not on file    Minutes of Exercise per Session: Not on file   Stress:     Feeling of Stress : Not on file   Social Connections:     Frequency of Communication with Friends and Family: Not on file    Frequency of Social Gatherings with Friends and Family: Not on file    Attends Jainism Services: Not on file    Active Member of [address] [place] [address] [employer] or Organizations: Not on file    Attends Club or Organization Meetings: Not on file    Marital Status: Not on file   Intimate Partner Violence:     Fear of Current or Ex-Partner: Not on file    Emotionally Abused: Not on file    Physically Abused: Not on file    Sexually Abused: Not on file   Housing Stability:     Unable to Pay for Housing in the Last Year: Not on file    Number of [place] in the Last Year: Not on file    Unstable Housing in the Last Year: Not on file       Orders Placed This Encounter    meloxicam (Mobic) 15 mg tablet     Sig: Take 1 Tablet by mouth daily. Dispense:  30 Tablet     Refill:  0        An electronic signature was used to authenticate this note.   -- Meghna Kaplan MD

## 2022-05-20 ENCOUNTER — OFFICE VISIT (OUTPATIENT)
Dept: ORTHOPEDIC SURGERY | Age: 67
End: 2022-05-20
Payer: MEDICARE

## 2022-05-20 VITALS — BODY MASS INDEX: 39.61 KG/M2 | WEIGHT: 232 LBS | HEIGHT: 64 IN

## 2022-05-20 DIAGNOSIS — Z96.651 STATUS POST RIGHT KNEE REPLACEMENT: Primary | ICD-10-CM

## 2022-05-20 DIAGNOSIS — Z09 SURGERY FOLLOW-UP: ICD-10-CM

## 2022-05-20 PROCEDURE — 99024 POSTOP FOLLOW-UP VISIT: CPT | Performed by: ORTHOPAEDIC SURGERY

## 2022-05-20 RX ORDER — MELOXICAM 15 MG/1
15 TABLET ORAL DAILY
Qty: 30 TABLET | Refills: 0 | Status: SHIPPED | OUTPATIENT
Start: 2022-05-20 | End: 2022-07-25 | Stop reason: SDUPTHER

## 2022-05-20 NOTE — LETTER
5/20/2022    Patient: Rich Malave   YOB: 1955   Date of Visit: 5/20/2022     Bridger Reyes MD  [facility] [address] [address]  Via Fax: [phone]    Dear Bridger Reyes MD,      Thank you for referring Ms. Seema Mackey to [facility] for evaluation. My notes for this consultation are attached. If you have questions, please do not hesitate to call me. I look forward to following your patient along with you.       Sincerely,    Bradley Cabrera MD

## 2022-05-23 ENCOUNTER — OFFICE VISIT (OUTPATIENT)
Dept: ORTHOPEDIC SURGERY | Age: 67
End: 2022-05-23
Payer: MEDICARE

## 2022-05-23 DIAGNOSIS — M17.11 PRIMARY OSTEOARTHRITIS OF RIGHT KNEE: ICD-10-CM

## 2022-05-23 DIAGNOSIS — M25.561 ACUTE PAIN OF RIGHT KNEE: Primary | ICD-10-CM

## 2022-05-23 PROCEDURE — 97140 MANUAL THERAPY 1/> REGIONS: CPT

## 2022-05-23 PROCEDURE — 97110 THERAPEUTIC EXERCISES: CPT

## 2022-05-23 NOTE — PROGRESS NOTES
Seema Mackey (: 1955) is a 74 y.o. Knee Pain       Patient Name: Ayana Gaspar  Date:2022  : 1955  [x]  Patient  Verified  Payor: Mauro Negro / Plan: [place] MEDICARE PART [place] & B / Product Type: Medicare /    Kulwant Robertson,*  Total Treatment Time (min): 60  Total Timed Codes (min): 55  1:1 Treatment Time ( [address] only): 54  Visit #:        Treatment Area: Right knee    ASSESSMENT/PLAN:  Below is the assessment and plan developed based on review of pertinent history, physical exam, labs, studies, and medications. 1. Acute pain of right knee  2. Primary osteoarthritis of right knee    Patient is making steady range of motion gains reporting that ADLs are getting easier. Continues to have tenderness around her patella with sedentary behavior after 15 minutes that increases with knee extension. Continue with current plan of care and progress towards long-term goals. Return in about 2 days (around 2022) for continued therapy for knee. SUBJECTIVE:  Knee Pain      Stated that she walked 1 mile at the park today and .5 miles in her neighborhood. She would like to get back to the pool at her gym. Reports that she is getting in and out of her care more easily but c/o of sharp knee pain after sit to stands from HEP over the weekend. OBJECTIVE:    Manual: (15 min)  STM/ Myofascial release to IT band, VLO, and rectus femoris. Hamstring Stretch. Friction massage to proximal half of scar line, passive range of motion in supine.     ROM: -3/120    K tape around patella     Exercise(mins 40)    PT Exercise Log         Activity/Exercise Date  22     Activity/Exercise  All exercises were supervised with verbal and tactile cues provided were necessary to perform the exercises with 100% accuracy       Bike [x]       Slant [x]    Shuttle heel raise     [x]    Shuttle quad     [x]    TKE   [x]    Single-leg Raise   []    Long arc quad   [x]    Marches   [x] Treadmill   []    Cup Walk   []    Standing Abduction    [x]      Straight leg raise [x] 3x10       Quad stretch []    Stools scoot []    Balance board []    Seated Marches   [x] EOB 3x10 e     Sit to Stand [x] 2x15   Up downs   [x] 2 inches   Hip Flexion S   [x]    IT band S off table     []      Knee flexion stretch                    [x]            Step ups                  [x]     6 inches                       []       --Co-treated by SEJAL Medrano

## 2022-05-23 NOTE — Clinical Note
CAPTURE PREF LIST FILTER)      Description Code Dx Service Date Service Prov Modifiers Qty Status               LA MANUAL THER TECH,1+REGIONS,EA 15 MIN 69377 CPT®  05/23/2022 Dilcia Hare PTA GP 1 Pend    LA THERAPEUTIC EXERCISES 64858 CPT®  05/23/2022 Dilcia Hare PTA GP 3 Pend

## 2022-05-26 ENCOUNTER — OFFICE VISIT (OUTPATIENT)
Dept: ORTHOPEDIC SURGERY | Age: 67
End: 2022-05-26
Payer: MEDICARE

## 2022-05-26 DIAGNOSIS — M25.561 ACUTE PAIN OF RIGHT KNEE: Primary | ICD-10-CM

## 2022-05-26 DIAGNOSIS — M17.11 PRIMARY OSTEOARTHRITIS OF RIGHT KNEE: ICD-10-CM

## 2022-05-26 PROCEDURE — 97035 APP MDLTY 1+ULTRASOUND EA 15: CPT | Performed by: PHYSICAL THERAPIST

## 2022-05-26 PROCEDURE — 97110 THERAPEUTIC EXERCISES: CPT | Performed by: PHYSICAL THERAPIST

## 2022-05-26 NOTE — PROGRESS NOTES
Seema Mackey (: 1955) is a 74 y.o. Knee Pain       Patient Name: Cornelius Brock  Date:2022  : 1955  [x]  Patient  Verified  Payor: VA MEDICARE / Plan: VA MEDICARE PART A & B / Product Type: Medicare /    No ref. provider found  Total Treatment Time (min): 60  Total Timed Codes (min): 55  1:1 Treatment Time ( only): 54  Visit #: 8 of 25       Treatment Area: Right knee    ASSESSMENT/PLAN:  Below is the assessment and plan developed based on review of pertinent history, physical exam, labs, studies, and medications. 1. Acute pain of right knee  2. Primary osteoarthritis of right knee    Patient has excellent functional range of motion. She was able to descend stairs using alternate step pattern with handrail assist.  Eccentric control needs to improve. We will have her work on some modified stepdown exercises at home. Continue with current plan of care and progress towards long-term goals. Return in about 5 days (around 2022) for continued therapy for knee. SUBJECTIVE:  Knee Pain      Knee getting better with sit to stand transition. Still some soreness on the inside and medial tibia. OBJECTIVE:    Manual: (5 min)  Hamstring Stretch. Friction massage to proximal half of scar line, passive range of motion in supine. ROM: -3/120    Ultrasound (8 minutes)  Medial joint line at 50% duty cycle and 1.0 MHz.     Exercise(mins 45)    PT Exercise Log         Activity/Exercise Date  22     Activity/Exercise  All exercises were supervised with verbal and tactile cues provided were necessary to perform the exercises with 100% accuracy       Bike [x]       Slant [x]    Shuttle heel raise     [x]    Shuttle quad     [x]    TKE   [x]    Single-leg Raise   []    Long arc quad   [x]    Marches   [x]    Treadmill   []    Stairwell   [x]    Standing Abduction    [x]      Straight leg raise [x] 3x10       Quad stretch []    Stools scoot []    Balance board []    Seated [place]ola   [x] EOB 3x10 e     Sit to Stand [x] 2x15   Up downs   [x] 2 inches   Hip Flexion S   [x]    IT band S off table     []      Knee flexion stretch                    [x]            Step ups                  [x]     6 inches     Step downs                  [x] 2 inches      --Co-treated by SEJAL Wright

## 2022-06-01 ENCOUNTER — OFFICE VISIT (OUTPATIENT)
Dept: ORTHOPEDIC SURGERY | Age: 67
End: 2022-06-01
Payer: MEDICARE

## 2022-06-01 DIAGNOSIS — M17.11 PRIMARY OSTEOARTHRITIS OF RIGHT KNEE: ICD-10-CM

## 2022-06-01 DIAGNOSIS — M25.561 ACUTE PAIN OF RIGHT KNEE: Primary | ICD-10-CM

## 2022-06-01 PROCEDURE — 97110 THERAPEUTIC EXERCISES: CPT

## 2022-06-01 PROCEDURE — 97140 MANUAL THERAPY 1/> REGIONS: CPT

## 2022-06-01 PROCEDURE — 97035 APP MDLTY 1+ULTRASOUND EA 15: CPT

## 2022-06-01 NOTE — Clinical Note
Charges Requiring Review      Description Code Dx Service Date Service Prov Modifiers Qty Status               99308 Therapeutic Exercise w/ GP Modifier 40689 CPT®  06/01/2022 Janeth Navarro PTA GP 2 New    83265 Manual Therapy ea 15min 1+ Region 55942 CPT®  06/01/2022 Janeth Navarro PTA GP 1 New

## 2022-06-01 NOTE — PROGRESS NOTES
Seema Mackey (: 1955) is a 74 y.o. Ankle Pain       Patient Name: Naomie Yepez  Date:2022  : 1955  [x]  Patient  Verified  Payor: VA MEDICARE / Plan: VA MEDICARE PART A & B / Product Type: Medicare /    No ref. provider found  Total Treatment Time (min): 60  Total Timed Codes (min): 48  1:1 Treatment Time ( only): 48  Visit #:  25       Treatment Area: Right knee    ASSESSMENT/PLAN:  Below is the assessment and plan developed based on review of pertinent history, physical exam, labs, studies, and medications. 1. Acute pain of right knee  2. Primary osteoarthritis of right knee    Patient is doing well with added lateral movements today with resistance and eccentric quad control on 4 inch step. Continues to have pain on the medial side of her knee and decreased quad strength on her right side. Encouraged patient to decrease use of stair lift and to use right hand rail, at all times for safety, to improve quad strength at home. Continue with current plan of care and progress towards long-term goals. Return in about 6 days (around 2022) for continue therapy for knee pain . SUBJECTIVE:  Knee Pain    Ankle Pain      Stated that most of her HEP is getting easier. Stairs are still challenging and she still swings her leg to get in and out of car but there is less discomfort. She stated that she really felt discomfort while doing lateral swaying during choir . Stated that she is using her stair lift at home. OBJECTIVE:    Manual: (8 min)  STM to medial joint line, distal quad. Friction massage to scar line, passive range of motion in supine. ROM: not taken    Ultrasound (8 minutes)  Medial joint line at 50% duty cycle and 1.0 MHz.     Exercise(mins 40)    PT Exercise Log         Activity/Exercise Date  22     Activity/Exercise  All exercises were supervised with verbal and tactile cues provided were necessary to perform the exercises with 100% accuracy       Bike [x] 6 min       Slant [x] 1 min = 1 min march   Shuttle heel raise     [x]    Shuttle quad     [x]    TKE   [x]    Single-leg Raise   []    Long arc quad   []    Marches   [x]    Treadmill   []    Stairwell   [x]    Band abd walk B   [x] 2x10     Straight leg raise [x] 3x10       Quad stretch []    Stools scoot []    Balance board []    Standing Marches   [x]  3x10 e     Sit to Stand [x] 2x15   Up downs   [x] 2 inches   Hip Flexion S   [x]    IT band S off table     []      Knee flexion stretch                    [x]            Step ups                  [x]     6 inches     Step downs                  [x] 2 inches      --Co-treated by SEJAL Chaves

## 2022-06-02 ENCOUNTER — OFFICE VISIT (OUTPATIENT)
Dept: ORTHOPEDIC SURGERY | Age: 67
End: 2022-06-02
Payer: MEDICARE

## 2022-06-02 DIAGNOSIS — M25.561 ACUTE PAIN OF RIGHT KNEE: Primary | ICD-10-CM

## 2022-06-02 DIAGNOSIS — M17.11 PRIMARY OSTEOARTHRITIS OF RIGHT KNEE: ICD-10-CM

## 2022-06-02 PROCEDURE — 97110 THERAPEUTIC EXERCISES: CPT

## 2022-06-02 PROCEDURE — 97140 MANUAL THERAPY 1/> REGIONS: CPT

## 2022-06-02 NOTE — PROGRESS NOTES
Seema Mackey (: 1955) is a 74 y.o. Knee Pain       Patient Name: Zuri Esposito  [id number]  : 1955  [x]  Patient  Verified  Payor: VA MEDICARE / Plan: VA MEDICARE PART A & B / Product Type: Medicare /    No ref. provider found  Total Treatment Time (min): 60  Total Timed Codes (min): 48  1:1 Treatment Time ( only): 48  Visit #:  of 25       Treatment Area: Right knee    ASSESSMENT/PLAN:  Below is the assessment and plan developed based on review of pertinent history, physical exam, labs, studies, and medications. 1. Acute pain of right knee  2. Primary osteoarthritis of right knee    Patient is slowly getting feeling back on lateral joint line. She tolerated soft tissue massage and friction massage with much less apprehension and grimmising. She is making progress with quadriceps strength, is -1 degree from achieving TKE, and continues to report decreased pain with daily activities. Encouraged patient to stretch at home in order to improve muscle extensibility and decrease tension around medial joint line. Continue with current plan of care and progress towards long-term goals. Return in about 5 days (around 2022) for continued therapy for knee. SUBJECTIVE:  Ankle Pain    Knee Pain      No new complaints. OBJECTIVE:    Manual: (8 min)  STM to medial joint line, distal quad. Friction massage to scar line, passive range of motion in supine.     ROM: not taken      Exercise(mins 40)    PT Exercise Log         Activity/Exercise Date  22     Activity/Exercise  All exercises were supervised with verbal and tactile cues provided were necessary to perform the exercises with 100% accuracy       Bike [x] 6 min       Slant [x] 1 min = 1 min march   Shuttle heel raise     [x]    Shuttle quad     [x]    TKE   [x]    Single-leg Raise   []    Long arc quad   []    Treadmill   []    Stairwell   []    Band abd walk B   [x] 2x10     Straight leg raise [x] 3x10       Quad stretch []    Stools scoot []    Balance board []    Standing Marches   [x]  3x10 e     Squats  [x] 2x15   Up downs   [x] 2 inches   Hip Flexion S   [x]    IT band S off table     []      Knee flexion stretch                    [x]           --Co-treated by SEJAL Del Cid

## 2022-06-02 NOTE — Clinical Note
Charges Requiring Review      Description Code Dx Service Date Service Prov Modifiers Qty Status               00019 Therapeutic Exercise w/ GP Modifier 49737 CPT®  06/02/2022 Joy Sanz, PTA GP 2 New    14416 Manual Therapy ea 15min 1+ Region 18360 CPT®  06/02/2022 Joy Sanz, SOPHY GP 1 New

## 2022-06-09 ENCOUNTER — OFFICE VISIT (OUTPATIENT)
Dept: ORTHOPEDIC SURGERY | Age: 67
End: 2022-06-09
Payer: MEDICARE

## 2022-06-09 DIAGNOSIS — M17.11 PRIMARY OSTEOARTHRITIS OF RIGHT KNEE: Primary | ICD-10-CM

## 2022-06-09 DIAGNOSIS — M25.561 ACUTE PAIN OF RIGHT KNEE: ICD-10-CM

## 2022-06-09 PROCEDURE — 97110 THERAPEUTIC EXERCISES: CPT

## 2022-06-09 PROCEDURE — 97140 MANUAL THERAPY 1/> REGIONS: CPT

## 2022-06-09 NOTE — PROGRESS NOTES
Seema Mackey (: 1955) is a 74 y.o. Knee Pain       Patient Name: Luke Wilkins  Date:2022  : 1955  [x]  Patient  Verified  Payor: VA MEDICARE / Plan: VA MEDICARE PART A & B / Product Type: Medicare /    No ref. provider found  Total Treatment Time (min): 60  Total Timed Codes (min): 60  1:1 Treatment Time ( only): 60  Visit #: 10 of 25       Treatment Area: Right knee    ASSESSMENT/PLAN:  Below is the assessment and plan developed based on review of pertinent history, physical exam, labs, studies, and medications. 1. Primary osteoarthritis of right knee  2. Acute pain of right knee    Patient is making steady strength gains and has progressed to more functional strengthening to improve  bed mobility and transfers with decreased pain. Continues to have low tolerance to ambulation and pain that wakes her at night. Noted mild edema at lateral joint line. Will work on stability and balance on uneven surfaces next session. Continue with current plan of care and progress towards long-term goals. Return in about 4 days (around 2022) for continued therapy for knee. SUBJECTIVE:  Knee Pain    Ankle Pain      Patient stated that she was able to go walking 1.5 miles without pain. Reports that she is having aching pain during the night that wakes her up because she cant get comfortable on her side with body pillow any longer. Pain with transitions, getting up from chair and moving around in the bed. OBJECTIVE:    Manual: (15 min)  STM to medial joint line, distal quad. Friction massage to scar line, passive range of motion in supine. Stretch to Hamstring, Quad, Iliopsoas, Piriformis, and IT band post workout.       ROM: Supine Passive   Right 0-120   Left 0-115     Exercise(mins 45)    PT Exercise Log         Activity/Exercise Date  22     Activity/Exercise  All exercises were supervised with verbal and tactile cues provided were necessary to perform the exercises with 100% accuracy       Bike [x] 6 min       Slant [x] 1 min    Shuttle heel raise     [x]    Shuttle quad     [x]    TKE   [x]    Single-leg Raise   []    Long arc quad   []    Treadmill   []    Stairwell   []    Band abd walk B   [x] 2x10     Straight leg raise [x] 3x10       Quad stretch []    Stools scoot []    Balance board []    Standing Marches    [x]  3x10 e     Squats  [x] 2x15   Up downs   [x] 2 inches   Hip Flexion S   [x]    IT band S off table     []      Knee flexion stretch                    [x]           --Co-treated by [employer] Time WineSEJAL

## 2022-06-09 NOTE — Clinical Note
Charges Requiring Review      Description Code Dx Service Date Service Prov Modifiers Qty Status               40540 Therapeutic Exercise w/ GP Modifier 59551 CPT®  06/09/2022 Zofia Padilla, PTA GP 3 New    50476 Manual Therapy ea 15min 1+ Region 68160 CPT®  06/09/2022 Zofia Padilla, SOPHY GP 1 New

## 2022-06-10 ENCOUNTER — OFFICE VISIT (OUTPATIENT)
Dept: ORTHOPEDIC SURGERY | Age: 67
End: 2022-06-10
Payer: MEDICARE

## 2022-06-10 DIAGNOSIS — M17.11 PRIMARY OSTEOARTHRITIS OF RIGHT KNEE: ICD-10-CM

## 2022-06-10 DIAGNOSIS — Z96.651 STATUS POST RIGHT KNEE REPLACEMENT: Primary | ICD-10-CM

## 2022-06-10 PROCEDURE — 97110 THERAPEUTIC EXERCISES: CPT

## 2022-06-10 PROCEDURE — 97140 MANUAL THERAPY 1/> REGIONS: CPT

## 2022-06-10 NOTE — PROGRESS NOTES
Seema Mackey (: 1955) is a 74 y.o. Knee Pain       Patient Name: Kanwal Flores  Date:6/10/2022  : 1955  [x]  Patient  Verified  Payor: VA MEDICARE / Plan: VA MEDICARE PART A & B / Product Type: Medicare /    No ref. provider found  Total Treatment Time (min): 53  Total Timed Codes (min): 53  1:1 Treatment Time ( only): 48  Visit #:  of 25       Treatment Area: Right knee    ASSESSMENT/PLAN:  Below is the assessment and plan developed based on review of pertinent history, physical exam, labs, studies, and medications. 1. Status post right knee replacement  2. Primary osteoarthritis of right knee    Patient had increased soreness after yesterdays session. We focused on balance and gait today and she was able to perform SLS for 6 seconds, and tandem stance for 15+. We will continue to work on improving her single leg stance and LE stability to decrease risk of falls with community ambulation. Continue with current plan of care and progress towards long-term goals. Return in about 3 days (around 2022) for continue therapy for knee pain. SUBJECTIVE:  Knee Pain    Ankle Pain      Patient was sore after yesterdays session. Squats are hard on both knees. OBJECTIVE:    Manual: (8 min)  STM to medial joint line, distal quad. Friction massage to scar line, passive range of motion in supine.      ROM: Supine Passive 22  Right 0-120   Left 0-115     Declined Ice*    Exercise(mins 45)    PT Exercise Log         Activity/Exercise Date  06/10/22     Activity/Exercise  All exercises were supervised with verbal and tactile cues provided were necessary to perform the exercises with 100% accuracy       Bike [x] 6 min       Slant [x] 1 min    Shuttle heel raise     [x]    Shuttle quad     []    TKE   []    Single-leg stance balance B   [x] 10x10'   Standing Hip ABD/EXT [x] 3x10   Treadmill   []    Cups with curb   [x] 5x   Band abd walk B   [] 2x10     Hamstring S c band [x] 3x30' []    Tandem stance/ Walk [x] 10x10'  5x with CGA   Balance board [x] A/P 30x   Standing Marches    [x]  3x10 e     Squats  [] 2x15   Up downs   [] 2 inches   Hip Flexion S   []    IT band S off table     []      Knee flexion stretch                    []           --Co-treated by SEJAL Leroy

## 2022-06-10 NOTE — Clinical Note
Charges Entered Within the Last 7 Days (Filtered by [facility] AMB DEP PROFILE CHARGE CAPTURE PREF LIST FILTER)      Description Code Dx Service Date Service Prov Modifiers Qty Status               NE MANUAL THER TECH,1+REGIONS,EA 15 MIN 15458 CPT®  06/10/2022 David Bermudez PTA GP 1 Pend    NE THERAPEUTIC EXERCISES 21805 CPT®  06/10/2022 David Bermudez PTA GP 3 Pend

## 2022-06-13 ENCOUNTER — OFFICE VISIT (OUTPATIENT)
Dept: ORTHOPEDIC SURGERY | Age: 67
End: 2022-06-13
Payer: MEDICARE

## 2022-06-13 DIAGNOSIS — M17.11 PRIMARY OSTEOARTHRITIS OF RIGHT KNEE: Primary | ICD-10-CM

## 2022-06-13 DIAGNOSIS — M25.561 ACUTE PAIN OF RIGHT KNEE: ICD-10-CM

## 2022-06-13 PROCEDURE — 97110 THERAPEUTIC EXERCISES: CPT

## 2022-06-13 PROCEDURE — 97140 MANUAL THERAPY 1/> REGIONS: CPT

## 2022-06-13 NOTE — Clinical Note
Charges Entered Within the Last 7 Days (Filtered by [facility] AMB DEP PROFILE CHARGE CAPTURE PREF LIST FILTER)      Description Code Dx Service Date Service Prov Modifiers Qty Status               AK MANUAL THER TECH,1+REGIONS,EA 15 MIN 97560 CPT®  06/13/2022 Stephany Evans PTA GP 1 Pend    AK THERAPEUTIC EXERCISES 22010 CPT®  06/13/2022 Stephany Evans PTA GP 2 Pend

## 2022-06-13 NOTE — PROGRESS NOTES
Seema Mackey (: 1955) is a 74 y.o. Knee Pain       Patient Name: Vish Ornelas  VDFR:  : 1955  [x]  Patient  Verified  Payor: Severiano Quinton / Plan: [place] MEDICARE PART A & B / Product Type: Medicare /    Cecelia Kehr, MD  Total Treatment Time (min): 60  Total Timed Codes (min): 45  1:1 Treatment Time ( [address] only): 39  Visit #: 766 of 25       Treatment Area: Right knee    ASSESSMENT/PLAN:  Below is the assessment and plan developed based on review of pertinent history, physical exam, labs, studies, and medications. 1. Primary osteoarthritis of right knee  2. Acute pain of right knee    Patient is making steady strength progress and reports being able to complete tasks that she did prior to surgery. Continues to have mild discomfort with sitting for prolonged periods of time. Continue with current plan of care and progress towards long-term goals. Return in about 2 days (around 6/15/2022) for continued therapy for knee. SUBJECTIVE:  Knee Pain    Ankle Pain      Reports that she is having less pain at night. She has a mild ache at times. She is now able to put her socks on as she did prior to surgery. She went walking 2+ miles over the weekend with no issues. OBJECTIVE:    Manual: (15 min)  Extensive retro-grade massage to LE with elevation.  Stretch to Hamstring, Piriformis,     ROM: Not accessed     Exercise(mins 30)    PT Exercise Log         Activity/Exercise Date  22     Activity/Exercise  All exercises were supervised with verbal and tactile cues provided were necessary to perform the exercises with 100% accuracy       Bike [x] 6 min       Slant [x] 1 min    Shuttle heel raise     [x]    Single L Stance B     [x] 10x10' e   Cups   [x] 5x   Single-leg Raise   []    Long arc quad   []    Treadmill   []    Stairwell   []    Stairs  [] 3x15     Sit to Stand [x] 3x10       Quad stretch []    Stools scoot []    Balance board []    Standing Marches    [x]  3x10 e Standing Hip Adb, Ext [x] 1# 2x15   Step ups/ downs   [x] 3x15   Hip Flexion S   [x]    IT band S off table     []      Knee flexion stretch                    []           --Co-treated by SEJAL Reaves

## 2022-06-15 ENCOUNTER — OFFICE VISIT (OUTPATIENT)
Dept: ORTHOPEDIC SURGERY | Age: 67
End: 2022-06-15
Payer: MEDICARE

## 2022-06-15 DIAGNOSIS — M17.11 PRIMARY OSTEOARTHRITIS OF RIGHT KNEE: Primary | ICD-10-CM

## 2022-06-15 DIAGNOSIS — M25.561 ACUTE PAIN OF RIGHT KNEE: ICD-10-CM

## 2022-06-15 PROCEDURE — 97110 THERAPEUTIC EXERCISES: CPT

## 2022-06-15 PROCEDURE — 97140 MANUAL THERAPY 1/> REGIONS: CPT

## 2022-06-15 NOTE — Clinical Note
Charges Requiring Review      Description Code Dx Service Date Service Prov Modifiers Qty Status               02574 Therapeutic Exercise w/ GP Modifier 31757 CPT®  06/15/2022 Zofia Padilla, SOPHY GP 2 New    59756 Manual Therapy ea 15min 1+ Region 66211 CPT®  06/15/2022 Zofia Padilla PTA GP 1 New

## 2022-06-15 NOTE — PROGRESS NOTES
Seema Mackey (: 1955) is a 74 y.o. Knee Pain       Patient Name: Kanwal Flores  WQEA:  : 1955  [x]  Patient  Verified  Payor: VA MEDICARE / Plan: VA MEDICARE PART A & B / Product Type: Medicare /    No ref. provider found  Total Treatment Time (min): 45  Total Timed Codes (min): 45  1:1 Treatment Time ( only): 39  Visit #: 14 of 25       Treatment Area: Right knee    ASSESSMENT/PLAN:  Below is the assessment and plan developed based on review of pertinent history, physical exam, labs, studies, and medications. 1. Primary osteoarthritis of right knee  2. Acute pain of right knee    Patient is making great overall progress with ability to tolerate sitting longer with decreased knee pain. Noted decreased gluteus kristen strength during bridges and had crapping in her feet for 10 min's after this exercise. Was able to complete with VC given to push through heels. Continues to have tenderness to touch around scar line with mild swelling under and around patella. Educated patient on stretching and water intake to decrease muscle cramping, patient verbalized understanding. Return in about 5 days (around 2022) for Continue therapy for knee. SUBJECTIVE:  Knee Pain    Ankle Pain      Patient stated that she is able to sit through bible study much easier with less knee pain. She is still having some discomfort during transitions but this is improving. She is not icing at home. OBJECTIVE:    Manual: (15 min)  Extensive retro-grade massage to LE with elevation. Knee to chest stretch with passive range of motion. Gastrocnemius stretch, 1st Metatarsal Stretch. STM to distal IT band, quadriceps, anterior knee joint around patellar joint.      ROM: Not accessed     Exercise(mins 30)    PT Exercise Log         Activity/Exercise Date  06/15/22     Activity/Exercise  All exercises were supervised with verbal and tactile cues provided were necessary to perform the exercises with 100% accuracy       Bike [x] 6 min       Slant [x] 1 min    Shuttle heel raise     [x]    Single L Stance B - no UE support     [x] 10x10' e   Cups   [x] 5x   Single-leg Raise   []    Long arc quad   []    Treadmill   []    Stairwell   []    Stairs  [] 3x15     Sit to Stand [x] 3x10       Quad stretch []    Stools scoot []    Balance board []    Standing Marches    [x]  3x10 e     Standing Hip Adb, Ext [x] 1# 2x15   Step ups/ downs   [] 3x15   Hip Flexion S   [x]    IT band S off table     []      Knee flexion stretch                    []           --Co-treated by SEJAL Rojas

## 2022-06-21 ENCOUNTER — OFFICE VISIT (OUTPATIENT)
Dept: ORTHOPEDIC SURGERY | Age: 67
End: 2022-06-21
Payer: MEDICARE

## 2022-06-21 DIAGNOSIS — M25.561 ACUTE PAIN OF RIGHT KNEE: ICD-10-CM

## 2022-06-21 DIAGNOSIS — M17.11 PRIMARY OSTEOARTHRITIS OF RIGHT KNEE: Primary | ICD-10-CM

## 2022-06-21 PROCEDURE — 97140 MANUAL THERAPY 1/> REGIONS: CPT

## 2022-06-21 PROCEDURE — 97110 THERAPEUTIC EXERCISES: CPT

## 2022-06-21 NOTE — PROGRESS NOTES
Seema Mackey (: 1955) is a 74 y.o. Knee Pain       Patient Name: Naomie Yepez  Date:2022  : 1955  [x]  Patient  Verified  Payor: Yumiko López / Plan: VA MEDICARE PART A & B / Product Type: Medicare /    No ref. provider found  Total Treatment Time (min): 70  Total Timed Codes (min): 55  1:1 Treatment Time ( only): 54  Visit #: 15 of 25       Treatment Area: Right knee    ASSESSMENT/PLAN:  Below is the assessment and plan developed based on review of pertinent history, physical exam, labs, studies, and medications. 1. Primary osteoarthritis of right knee  2. Acute pain of right knee    Patient is making steady LE strength gains and was able to perform a tandem walk today with no LOB. She continues to have discomfort around posterior and lateral joint line, increased after sitting for long periods of time. Mild edema under patella. Demonstrated hamstring, calf, and iliopsoas stretch for patient to perform at home to improve muscle extensibility. Continue with current plan of care and progress toward long-term goals. Return in about 9 days (around 2022) for continue therapy for knee pain. SUBJECTIVE:  Knee Pain    Ankle Pain      Stated that she is having some discomfort with putting her left heel on her right knee to put shoes on, \"just feels weird, like I can feel the hardware. \" Reports that stairs are getting easier but she has to stop at times to correct her reciprocal gait pattern. Stated that she is performing 30 min bike ride, on reciprocal bike at [facility], and performing LE press with no increase in pain. Reports that sitting for longer periods of time is better but it is still difficult to get up. OBJECTIVE:    Manual: (15 min)  Retro-grade massage to peripatella . Knee to chest stretch with passive range of motion. Friction massage to scar tissue. STM to distal IT band, lateral joint line. Hamstring, and IT band stretch.      ROM: not specifically measured - ( -3-130 )     Modalities:   Ice to anterior knee elevated-( 15 min)     Exercise(mins 40)    PT Exercise Log         Activity/Exercise Date  06/21/22     Activity/Exercise  All exercises were supervised with verbal and tactile cues provided were necessary to perform the exercises with 100% accuracy       Bike [x] 6 min       Slant [x] 1 min    Shuttle heel raise     [x]    Single L Stance B - no UE support     [x] 10x10' e   Cups   [x] 5x   Single-leg Raise   []    Long arc quad   []    Treadmill   []    Stairwell   []    Stairs  [] 3x15     Sit to Stand [x] 3x10       Quad stretch []    Stools scoot []    Tandem Walk [x] No UE support   5x   Standing Marches    [x]  3x10 e     Standing Hip Adb, Ext [x] 1# 2x15   Step ups/ downs   [x] 3x15   Hip Flexion S   [x]    IT band S off table     []      Knee flexion stretch                    []           --Co-treated by SEJAL Aburto

## 2022-06-24 ENCOUNTER — OFFICE VISIT (OUTPATIENT)
Dept: ORTHOPEDIC SURGERY | Age: 67
End: 2022-06-24
Payer: MEDICARE

## 2022-06-24 ENCOUNTER — OFFICE VISIT (OUTPATIENT)
Dept: ORTHOPEDIC SURGERY | Age: 67
End: 2022-06-24

## 2022-06-24 VITALS — BODY MASS INDEX: 39.09 KG/M2 | HEIGHT: 64 IN | WEIGHT: 229 LBS

## 2022-06-24 DIAGNOSIS — M25.561 ACUTE PAIN OF RIGHT KNEE: ICD-10-CM

## 2022-06-24 DIAGNOSIS — Z09 SURGERY FOLLOW-UP: ICD-10-CM

## 2022-06-24 DIAGNOSIS — M17.12 PRIMARY OSTEOARTHRITIS OF LEFT KNEE: ICD-10-CM

## 2022-06-24 DIAGNOSIS — Z96.651 STATUS POST RIGHT KNEE REPLACEMENT: Primary | ICD-10-CM

## 2022-06-24 DIAGNOSIS — M17.11 PRIMARY OSTEOARTHRITIS OF RIGHT KNEE: Primary | ICD-10-CM

## 2022-06-24 PROCEDURE — 3017F COLORECTAL CA SCREEN DOC REV: CPT | Performed by: ORTHOPAEDIC SURGERY

## 2022-06-24 PROCEDURE — 97110 THERAPEUTIC EXERCISES: CPT

## 2022-06-24 PROCEDURE — 97140 MANUAL THERAPY 1/> REGIONS: CPT

## 2022-06-24 PROCEDURE — 1123F ACP DISCUSS/DSCN MKR DOCD: CPT | Performed by: ORTHOPAEDIC SURGERY

## 2022-06-24 PROCEDURE — G8417 CALC BMI ABV UP PARAM F/U: HCPCS | Performed by: ORTHOPAEDIC SURGERY

## 2022-06-24 PROCEDURE — 99213 OFFICE O/P EST LOW 20 MIN: CPT | Performed by: ORTHOPAEDIC SURGERY

## 2022-06-24 PROCEDURE — G8427 DOCREV CUR MEDS BY ELIG CLIN: HCPCS | Performed by: ORTHOPAEDIC SURGERY

## 2022-06-24 PROCEDURE — G8399 PT W/DXA RESULTS DOCUMENT: HCPCS | Performed by: ORTHOPAEDIC SURGERY

## 2022-06-24 PROCEDURE — 1101F PT FALLS ASSESS-DOCD LE1/YR: CPT | Performed by: ORTHOPAEDIC SURGERY

## 2022-06-24 PROCEDURE — 1090F PRES/ABSN URINE INCON ASSESS: CPT | Performed by: ORTHOPAEDIC SURGERY

## 2022-06-24 PROCEDURE — G8536 NO DOC ELDER MAL SCRN: HCPCS | Performed by: ORTHOPAEDIC SURGERY

## 2022-06-24 PROCEDURE — G8432 DEP SCR NOT DOC, RNG: HCPCS | Performed by: ORTHOPAEDIC SURGERY

## 2022-06-24 NOTE — PROGRESS NOTES
Seema Mackey (: 1955) is a 74 y.o. Knee Pain       Patient Name: Liz [place]ms  Date:2022  : 1955  [x]  Patient  Verified  Payor: Shadi Dill / Plan: VA MEDICARE PART A & B / Product Type: Medicare /    Tonja Loera MD  Total Treatment Time (min): 45  Total Timed Codes (min): 45  1:1 Treatment Time ( [address] only): 39  Visit #: 15 of 25       Treatment Area: Right knee    ASSESSMENT/PLAN:  Below is the assessment and plan developed based on review of pertinent history, physical exam, labs, studies, and medications. 1. Primary osteoarthritis of right knee  2. Acute pain of right knee    Patient is doing fairly well with functional stability, range of motion, strength, and is able to complete ADLs independently. She is navigating stairs appropriatly and demonstraits a non-antalgic gait pattern. Continues to have increased tension around her right knee replacement with tenderness to touch during manual therapy. She would benefit from continued therapy in order to improve her range of motion and quality of life as well as her ability to care for her 80year old mother. Return in about 2 years (around 2024) for Continued Therapy for Knee. SUBJECTIVE:  Knee Pain    Ankle Pain      Stated that she walked 2.5 miles today with decreased rest breaks and felt really good. Reports that she feels her replacement when she is doing daily things. OBJECTIVE:    Manual: (15 min)  Retro-grade massage for LE from ankle joint line to distal quad and hamstring. ROM: Supine, contralateral leg bent- Passive range of motion right knee flexion 126    MMT:  4/5 Strength Hamstrings, Quad     Is able to perform sit to stand from standard chair with no UE support and rise from supine to sitting with no assistance.      Modalities:  No ice today- appointment conflict*    Exercise(mins 30)    PT Exercise Log         Activity/Exercise Date  22     Activity/Exercise  All exercises were supervised with verbal and tactile cues provided were necessary to perform the exercises with 100% accuracy       Bike [] 6 min       Slant [x] 1 min    Shuttle heel raise     [x] 36#   Single L Stance B - no UE support     [x] 10x10' e   Cups   [x] 5x   Single-leg Raise   []    Long arc quad   []    Treadmill   []    Stairwell   []    Stairs  [] 3x15     Sit to Stand [x] 3x10       Quad stretch []    Stools scoot []    Tandem Walk [x] No UE support   5x   Standing Marches    [x]  3x10 e     Standing Hip Adb, Ext [x] 2# 2x15   Step ups/ downs   [x] 3x15   Hip Flexion S   []    IT band S off table     []      Knee flexion stretch                    []           --Co-treated by SEJAL Johnson

## 2022-06-24 NOTE — PROGRESS NOTES
Seema Mackey (: 1955) is a 74 y.o. female, patient, here for evaluation of the following chief complaint(s):  Surgical Follow-up (RTK # # F/U (3/31/22))       SUBJECTIVE/OBJECTIVE:  Seema Mackey presents today for third postop visit status post right TKA on 3/30/22. Overall she is doing well, but comes in with some minor concerns. Relates significant improvement in overall mobility, knee range of motion, strength, endurance. She is still doing formal PT. Notes sensitivity over the anterior knee with blankets at night. Relates persistent difficulty with stairs, but overall moving around better. Relates mild persistent swelling. Still taking meloxicam.  She walked 2.2 miles this morning without difficulty. Known osteoarthritis in the left knee which has been nagging her. PHYSICAL EXAM:  Vitals: Ht 5' 4\" (1.626 m)   Wt 229 lb (103.9 kg)   BMI 39.31 kg/m²   Body mass index is 39.31 kg/m². 74y.o. year old F in no acute distress. Ambulates with a slight limp at startup. Neutral alignment of the right knee, healed anterior incision. Mild residual local soft tissue swelling, no effusion. ROM 0-125, preop 0-120. Right knee stable throughout arc of motion. Neutral alignment left knee. No focal tenderness. Crepitus with motion. Motor 5/5. No distal edema. IMAGING:  Radiographs: none today. Previous x-rays demonstrate severe medial compartment osteoarthritis in the left knee. ASSESSMENT/PLAN:  1. Status post right knee replacement  2. Surgery follow-up  3. Primary osteoarthritis of left knee    3 months s/p right TKA. Slow but steady progress. She was reassured. Everything she is experiencing is within the normal spectrum of recovery after knee replacement. Continue formal PT and d/c with HEP when she's ready. Her goal is to get back to 3 miles walking and maybe swimming. Follow up in about 9 months for routine one year POV, sooner if needed.       She may consider injection in the left prior to vacation in October. Return in about 9 months (around 3/24/2023) for routine one year postop visit. Review Of Systems  ROS     Positive for: Musculoskeletal    Last edited by Felipe Lizama RN on 6/24/2022 11:28 AM. (History)         Patient denies any recent fever, chills, nausea, vomiting, chest pain, or shortness of breath. Allergies   Allergen Reactions    Aspirin Palpitations    Codeine-Guaifenesin Other (comments)     CAUSES HEART PALPITATIONS AT NIGHT    Milk Prot-Turm-Pepper-Pineappl Diarrhea     CAN EAT ICE CREAM & CHEESE & BUTTER. DRINKS LACTAID    Minocycline Other (comments)     Told by MD she is allergic. She does not know the reaction. Current Outpatient Medications   Medication Sig    meloxicam (Mobic) 15 mg tablet Take 1 Tablet by mouth daily.  metFORMIN ER (GLUCOPHAGE XR) 500 mg tablet Take 1,000 mg by mouth daily.  cholecalciferol (VITAMIN D3) 1,000 unit tablet cholecalciferol (vitamin D3) 1,000 unit (25 mcg) tablet    pravastatin (PRAVACHOL) 10 mg tablet Take 10 mg by mouth. No current facility-administered medications for this visit.        Past Medical History:   Diagnosis Date    Arthritis     BOTH HIPS PAINFUL    Chronic pain     Diabetes (HCC)     PRE    Difficulty sleeping     Frequent urination     GERD (gastroesophageal reflux disease)     High cholesterol     Spinal stenosis, lumbar         Past Surgical History:   Procedure Laterality Date    HX HEENT  1980s    VOCAL CORD POLYP EXCISED    HX HIP REPLACEMENT Right 05/2012    HX ORTHOPAEDIC  1990s    FOOT SURGERY    HX ORTHOPAEDIC      back surgery    HX TONSILLECTOMY  CHILDHOOD       Family History   Problem Relation Age of Onset    Other Mother         CARDIAC ARREST DURING HIATAL HERNIA/REFLUX SURGERY;  ALSO, DOES NOT EVARISTO IV DYE    Hypertension Mother     Heart Disease Mother     Kidney Disease Mother     Anesth Problems Mother         UNSURE WHAT THE PROBLEM IS. IT WAS AFTER SURGERY IN THE MID 80'S.  Hypertension Father     Dementia Father     Heart Disease Father     Elevated Lipids Father         Social History     Socioeconomic History    Marital status:      Spouse name: Not on file    Number of children: Not on file    Years of education: Not on file    Highest education level: Not on file   Occupational History    Not on file   Tobacco Use    Smoking status: Never Smoker    Smokeless tobacco: Never Used   Substance and Sexual Activity    Alcohol use: Yes     Comment: RARE ALCOHOL    Drug use: No    Sexual activity: Not Currently   Other Topics Concern    Not on file   Social History Narrative    Not on file     Social Determinants of Health     Financial Resource Strain:     Difficulty of Paying Living Expenses: Not on file   Food Insecurity:     Worried About Running Out of Food in the Last Year: Not on file    Deshawn of Food in the Last Year: Not on file   Transportation Needs:     Lack of Transportation (Medical): Not on file    Lack of Transportation (Non-Medical):  Not on file   Physical Activity:     Days of Exercise per Week: Not on file    Minutes of Exercise per Session: Not on file   Stress:     Feeling of Stress : Not on file   Social Connections:     Frequency of Communication with Friends and Family: Not on file    Frequency of Social Gatherings with Friends and Family: Not on file    Attends Anabaptist Services: Not on file    Active Member of Clubs or Organizations: Not on file    Attends Club or Organization Meetings: Not on file    Marital Status: Not on file   Intimate Partner Violence:     Fear of Current or Ex-Partner: Not on file    Emotionally Abused: Not on file    Physically Abused: Not on file    Sexually Abused: Not on file   Housing Stability:     Unable to Pay for Housing in the Last Year: Not on file    Number of [place] in the Last Year: Not on file    Unstable Housing in the Last Year: Not on file         No orders of the defined types were placed in this encounter. Luis Armando Wasserman. Ignacio Moreno M.D. An electronic signature was used to authenticate this note.

## 2022-06-24 NOTE — Clinical Note
Charges Entered Within the Last 7 Days (Filtered by [facility] AMB DEP PROFILE CHARGE CAPTURE PREF LIST FILTER)      Description Code Dx Service Date Service Prov Modifiers Qty Status               [place] MANUAL THER TECH,1+REGIONS,EA 15 MIN 64306 CPT®  06/24/2022 Ala Pod, PTA GP 1 Pend    [place] THERAPEUTIC EXERCISES 59553 CPT®  06/24/2022 Ala Pod, PTA GP 2 Pend

## 2022-06-24 NOTE — LETTER
6/24/2022    Patient: Zuri Esposito   YOB: 1955   Date of Visit: 6/24/2022     Delon Cat MD  [address] [address]  Via Fax: [phone]    Dear Delon Cat MD,      Thank you for referring Ms. Seema Mackey to [facility] for evaluation. My notes for this consultation are attached. If you have questions, please do not hesitate to call me. I look forward to following your patient along with you.       Sincerely,    Swati Buckley MD

## 2022-06-28 ENCOUNTER — OFFICE VISIT (OUTPATIENT)
Dept: ORTHOPEDIC SURGERY | Age: 67
End: 2022-06-28
Payer: MEDICARE

## 2022-06-28 DIAGNOSIS — M17.11 PRIMARY OSTEOARTHRITIS OF RIGHT KNEE: Primary | ICD-10-CM

## 2022-06-28 DIAGNOSIS — M25.561 ACUTE PAIN OF RIGHT KNEE: ICD-10-CM

## 2022-06-28 PROCEDURE — 97140 MANUAL THERAPY 1/> REGIONS: CPT | Performed by: PHYSICAL THERAPIST

## 2022-06-28 PROCEDURE — 97110 THERAPEUTIC EXERCISES: CPT | Performed by: PHYSICAL THERAPIST

## 2022-06-28 NOTE — PROGRESS NOTES
Seema Mackey (: 1955) is a 74 y.o. Knee Pain       Patient Name: Luke Wilkins  Date:2022  : 1955  [x]  Patient  Verified  Payor: Yash Taurus / Plan: VA MEDICARE PART A & B / Product Type: Medicare /    Ross Beal MD  Total Treatment Time (min): 45  Total Timed Codes (min): 45  1:1 Treatment Time ( [address] only): 39  Visit #: 16 of 25       Treatment Area: Right knee    ASSESSMENT/PLAN:  Below is the assessment and plan developed based on review of pertinent history, physical exam, labs, studies, and medications. 1. Primary osteoarthritis of right knee  2. Acute pain of right knee    Patient has excellent terminal knee extension however she is much tighter through her right hamstring with a history of right total hip replacement. We will have her continue to address this with stretching at home to avoid future mechanical issues. She is doing well with her range of motion and is progressing with her strength. We will see her once a week for the next 2 to 4 weeks and progress towards long-term goals. Return in about 1 week (around 2022) for continued therapy for knee. SUBJECTIVE:  Knee Pain    Ankle Pain      Patient had follow-up with Dr. Sarah Perla. X-rays look good. She is to finish out physical therapy and transition towards home exercise program over the next month. OBJECTIVE:    Manual: (15 min)  Right hamstring stretch with long axis traction, soft tissue massage, myofascial release, and contract relax techniques. Knee-to-chest stretch and piriformis stretching. Right hip external rotation stretching.     ROM: Supine, contralateral leg bent- Passive range of motion right knee flexion 125          Modalities:  No ice today    Exercise(mins 30)    PT Exercise Log         Activity/Exercise Date  22     Activity/Exercise  All exercises were supervised with verbal and tactile cues provided were necessary to perform the exercises with 100% accuracy       Bike [] 6 min       Slant [x] 1 min    Shuttle heel raise     [x] 36#   Single L Stance B - no UE support     [x] 10x10' e   Cups   [x] 5x   Single-leg Raise   [x]    Long arc quad   [x]    Treadmill   []    Stairwell   []    Stairs  [] 3x15     Sit to Stand [x] 3x10       Quad stretch []    Stools scoot []    Tandem Walk [x] No UE support   5x   Standing Marches    [x]  3x10 e     Standing Hip Adb, Ext [x] 2# 2x15   Step ups/ downs   [x] 3x15   Hip Flexion S   []    IT band S off table     []      Knee flexion stretch                    []           --Co-treated by SEJAL Reaves

## 2022-07-05 ENCOUNTER — OFFICE VISIT (OUTPATIENT)
Dept: ORTHOPEDIC SURGERY | Age: 67
End: 2022-07-05
Payer: MEDICARE

## 2022-07-05 DIAGNOSIS — M25.561 ACUTE PAIN OF RIGHT KNEE: ICD-10-CM

## 2022-07-05 DIAGNOSIS — M17.11 PRIMARY OSTEOARTHRITIS OF RIGHT KNEE: Primary | ICD-10-CM

## 2022-07-05 PROCEDURE — 97110 THERAPEUTIC EXERCISES: CPT

## 2022-07-05 PROCEDURE — 97140 MANUAL THERAPY 1/> REGIONS: CPT

## 2022-07-05 NOTE — PROGRESS NOTES
Seema Mackey (: 1955) is a 74 y.o. Knee Pain       Patient Name: Luis French  Date:2022  : 1955  [x]  Patient  Verified  Payor: VA MEDICARE / Plan: VA MEDICARE PART A & B / Product Type: Medicare /    Hannah Green,*  Total Treatment Time (min): 45  Total Timed Codes (min): 45  1:1 Treatment Time ( [address] only): 39  Visit #: 17 of 25       Treatment Area: Right knee    ASSESSMENT/PLAN:  Below is the assessment and plan developed based on review of pertinent history, physical exam, labs, studies, and medications. 1. Primary osteoarthritis of right knee  2. Acute pain of right knee      Patient is doing well overall and is able to perform iADLs with less than 3/10 pain. She is now attending her local gym and walking outside in her park, complying with HEP. Continue with current plan of care and progress patient to increase quality of life. Return in about 1 week (around 2022) for continued therapy for knee. SUBJECTIVE:  Knee Pain    Ankle Pain      Patient stated that she is doing 20 min's on the bike at the gym and walking the path. She repots that she feels she is able to get in and out of her care at her prior level of function since starting therapy. OBJECTIVE:    Manual: (15 min)  Right hamstring stretch, Retrograde soft tissue massage to right LE elevated. Knee-to-chest stretch and piriformis stretching. Right hip external rotation stretching at EOB.      ROM: Not accessed       Modalities:  No ice today    Exercise(mins 30)    PT Exercise Log         Activity/Exercise Date  22     Activity/Exercise  All exercises were supervised with verbal and tactile cues provided were necessary to perform the exercises with 100% accuracy       Bike [] 6 min       Slant [x] 1 min    Shuttle heel raise     [x] 36#   Single L Stance B - no UE support     [x] 10x10' e   Cups   [x] 5x   Single-leg Raise   [x]    Long arc quad   [x]    Treadmill   []    Stairwell   [] Stairs  [] 3x15     Sit to Stand [x] 3x10       Quad stretch []    Stools scoot []    Tandem Walk [x] No UE support   5x   Standing Marches    []  3x10 e     Standing Hip Adb, Ext [x] 2# 2x15   Step ups/ downs   [x] 3x15   Hip Flexion S   []    IT band S off table     []      Knee flexion stretch                    []           --Co-treated by SEJAL Del Cid

## 2022-07-05 NOTE — Clinical Note
Charges Requiring Review      Description Code Dx Service Date Service Prov Modifiers Qty Status               84681 Therapeutic Exercise w/ GP Modifier 48461 CPT®  07/05/2022 Prisca Patiño, SOPHY GP 2 New    81777 Manual Therapy ea 15min 1+ Region 77976 CPT®  07/05/2022 Prisca Patiño, SOPHY GP 1 New

## 2022-07-12 ENCOUNTER — OFFICE VISIT (OUTPATIENT)
Dept: ORTHOPEDIC SURGERY | Age: 67
End: 2022-07-12
Payer: MEDICARE

## 2022-07-12 DIAGNOSIS — M17.11 PRIMARY OSTEOARTHRITIS OF RIGHT KNEE: Primary | ICD-10-CM

## 2022-07-12 DIAGNOSIS — M25.561 ACUTE PAIN OF RIGHT KNEE: ICD-10-CM

## 2022-07-12 PROCEDURE — 97110 THERAPEUTIC EXERCISES: CPT

## 2022-07-12 PROCEDURE — 97140 MANUAL THERAPY 1/> REGIONS: CPT

## 2022-07-12 NOTE — Clinical Note
Charges Requiring Review      Description Code Dx Service Date Service Prov Modifiers Qty Status               70317 Therapeutic Exercise w/ GP Modifier 07532 CPT®  07/12/2022 Lissette Jennings, PTA GP 2 New    30773 Manual Therapy ea 15min 1+ Region 74917 CPT®  07/12/2022 Lissette Jennings, PTA GP 1 New

## 2022-07-12 NOTE — PROGRESS NOTES
Seema Mackey (: 1955) is a 74 y.o. Knee Pain       Patient Name: Lisandra Monson  Date:2022  : 1955  [x]  Patient  Verified  Payor: Chris Hassan / Plan: [place] MEDICARE PART A & B / Product Type: Medicare /    Keith Phillip,*  Total Treatment Time (min): 45  Total Timed Codes (min): 45  1:1 Treatment Time ( [address] only): 39  Visit #: 18 of      Treatment Area: Right knee    ASSESSMENT/PLAN:  Below is the assessment and plan developed based on review of pertinent history, physical exam, labs, studies, and medications. 1. Primary osteoarthritis of right knee  2. Acute pain of right knee      Patient is doing well overall with the ability to complete iADLS, walk more than 2 miles, and independlety excersie at [facility] with mild pain. Continues to have increased tension and tenderness to touch on her posterior joint line. Educated patient that she may have discomfort up to a year, and provided stretches for her to add to her HEP in order to improve muscle extensibility. We will meet with her 1 more visit to follow up and transitition her to an independent program.     Return in about 2 days (around 2022) for Continued therapy for knee. SUBJECTIVE:  Knee Pain    Ankle Pain    Patient stated that she still has trouble getting up and down stairs, \"it just doesn't feel normal. Reports the feeling of her knee just feeling like its not part of her. Stated that she is able to walk her 1.5 mile outdoor trail with less rest breaks, and can sit for longer periods of time without pain. Stated that she is not doing much stretching at home. OBJECTIVE:    Manual: (15 min)  Knee-to-chest stretch and piriformis stretching. Hamstring and piriformis stretching in supine. STM/  myofascial release to distal quad/hamstring/ lateral and medial joint lines.      ROM: Passive       Modalities:  No ice today    Exercise(mins 30)    PT Exercise Log         Activity/Exercise Date  22 Activity/Exercise  All exercises were supervised with verbal and tactile cues provided were necessary to perform the exercises with 100% accuracy       TM incline 2.0 [x] 6 min       Slant 3 way [x] 1 min    Shuttle heel raise     [x] 36#   Single L Stance B - no UE support     [x] 10x10' e   Cups   [x] 5x   Single-leg Raise   [x] Holds to fatigue       [x]    Treadmill   []    Stairwell   []    Stairs  [] 3x15     Sit to Stand - butt taps [x] 3x10       Quad stretch []    Stools scoot []    Tandem Walk [x] No UE support   5x   Standing Marches - toe tap at TM   [x]  3x10 e     Standing Hip Adb, Ext [x] 2# 2x15   Step ups/ downs   [x] 3x15   Hip Flexion S   []    IT band S off table     []      Knee flexion stretch                    [x]           --Co-treated by SEJAL Gloria

## 2022-07-25 DIAGNOSIS — Z96.651 STATUS POST RIGHT KNEE REPLACEMENT: ICD-10-CM

## 2022-07-25 RX ORDER — MELOXICAM 15 MG/1
15 TABLET ORAL DAILY
Qty: 30 TABLET | Refills: 0 | Status: SHIPPED | OUTPATIENT
Start: 2022-07-25 | End: 2022-08-22

## 2022-07-26 ENCOUNTER — OFFICE VISIT (OUTPATIENT)
Dept: ORTHOPEDIC SURGERY | Age: 67
End: 2022-07-26
Payer: MEDICARE

## 2022-07-26 DIAGNOSIS — M25.561 ACUTE PAIN OF RIGHT KNEE: ICD-10-CM

## 2022-07-26 DIAGNOSIS — Z96.651 STATUS POST RIGHT KNEE REPLACEMENT: Primary | ICD-10-CM

## 2022-07-26 PROCEDURE — 97110 THERAPEUTIC EXERCISES: CPT

## 2022-07-26 PROCEDURE — 97140 MANUAL THERAPY 1/> REGIONS: CPT

## 2022-07-26 NOTE — PROGRESS NOTES
Seema Mackey (: 1955) is a 74 y.o. Knee Pain       Patient Name: Ricardo Bonilla  Date:2022  : 1955  [x]  Patient  Verified  Payor: Danilo Racer / Plan: [place] MEDICARE PART A & B / Product Type: Medicare /    Nayana Simeon,*  Total Treatment Time (min): 45  Total Timed Codes (min): 45  1:1 Treatment Time ([facility] only): 39  Visit #:      Treatment Area: Right knee    ASSESSMENT/PLAN:  Below is the assessment and plan developed based on review of pertinent history, physical exam, labs, studies, and medications. 1. Status post right knee replacement  2. Acute pain of right knee      Patient has achieved 126 degrees of knee flexion and is able to complete iADLs independently. Tolerated lateral strengthening this session with no pain. She does have mild soreness after independent work outs at her local gym, but I have assured her that this is normal and encouraged her to ice and take NSAIDS as needed to decrease this. I gave patient handout of an updated dynamic LE HEP to follow independently at this time. She will follow up if needed with any questions or concerns. Return if symptoms worsen or fail to improve. SUBJECTIVE:  Knee Pain    Ankle Pain    Patient stated that she is feeling more fluid movement going up and down stairs. She is working out at her local gym doing cardio on the bike and squats on the press machine. Reports that she isnt feeling pain any longer but does have some soreness on her quadriceps after workouts. OBJECTIVE:    Manual: (15 min)  Knee-to-chest stretch and piriformis stretching. Hamstring, IT band, and piriformis stretching in supine. STM/  myofascial release to distal quad/hamstring/ lateral and medial joint lines.      ROM: Passive Range of motion right   0-126       Modalities:  No ice today    Exercise(mins 30)    PT Exercise Log         Activity/Exercise Date  22     Activity/Exercise  All exercises were supervised with verbal and tactile cues provided were necessary to perform the exercises with 100% accuracy       TM incline 2.0 [x] 6 min       Slant 3 way [x] 1 min    Shuttle heel raise     [] 36#   Single L Stance B - no UE support     [x] 10x10' e   Cups   [] 5x   Single-leg Raise   [] Holds to fatigue       [x]    Treadmill   []    Stairwell   []    Stairs  [] 3x15     Sit to Stand - butt taps [x] 3x10       Quad stretch []    Stools scoot []    Tandem Walk [x] No UE support   5x   Standing Marches - toe tap at TM   [x]  3x10 e     Standing Hip Adb, Ext [x] 2# 2x15   Step ups/ downs   [x] 3x15   Hip Flexion S   []    IT band S off table     []      Knee flexion stretch                    [x]           --Co-treated by SEJAL Nathan

## 2022-08-22 ENCOUNTER — OFFICE VISIT (OUTPATIENT)
Dept: ORTHOPEDIC SURGERY | Age: 67
End: 2022-08-22
Payer: MEDICARE

## 2022-08-22 VITALS — HEIGHT: 64 IN | WEIGHT: 228 LBS | BODY MASS INDEX: 38.93 KG/M2

## 2022-08-22 DIAGNOSIS — M17.12 PRIMARY OSTEOARTHRITIS OF LEFT KNEE: Primary | ICD-10-CM

## 2022-08-22 PROCEDURE — 20610 DRAIN/INJ JOINT/BURSA W/O US: CPT | Performed by: PHYSICIAN ASSISTANT

## 2022-08-22 RX ORDER — IBUPROFEN 200 MG
400 TABLET ORAL
COMMUNITY
End: 2022-08-22

## 2022-08-22 RX ORDER — BUPIVACAINE HYDROCHLORIDE 2.5 MG/ML
5 INJECTION, SOLUTION INFILTRATION; PERINEURAL ONCE
Status: COMPLETED | OUTPATIENT
Start: 2022-08-22 | End: 2022-08-22

## 2022-08-22 RX ORDER — DICLOFENAC SODIUM 10 MG/G
GEL TOPICAL 4 TIMES DAILY
COMMUNITY

## 2022-08-22 RX ORDER — TRIAMCINOLONE ACETONIDE 40 MG/ML
40 INJECTION, SUSPENSION INTRA-ARTICULAR; INTRAMUSCULAR ONCE
Status: COMPLETED | OUTPATIENT
Start: 2022-08-22 | End: 2022-08-22

## 2022-08-22 RX ORDER — MELOXICAM 15 MG/1
15 TABLET ORAL DAILY
Qty: 30 TABLET | Refills: 1 | Status: SHIPPED | OUTPATIENT
Start: 2022-08-22

## 2022-08-22 RX ORDER — METHYLPREDNISOLONE 4 MG/1
TABLET ORAL
Qty: 1 DOSE PACK | Refills: 0 | Status: SHIPPED | OUTPATIENT
Start: 2022-08-22

## 2022-08-22 RX ADMIN — BUPIVACAINE HYDROCHLORIDE 12.5 MG: 2.5 INJECTION, SOLUTION INFILTRATION; PERINEURAL at 17:23

## 2022-08-22 RX ADMIN — TRIAMCINOLONE ACETONIDE 40 MG: 40 INJECTION, SUSPENSION INTRA-ARTICULAR; INTRAMUSCULAR at 17:23

## 2022-08-22 NOTE — PROGRESS NOTES
Seema Mackey (: 1955) is a 74 y.o. female, patient, here for evaluation of the following chief complaint(s):  Knee Pain (left)       SUBJECTIVE/OBJECTIVE:  Seema Mackey presents today complaining of left knee pain. She was very busy last week, and noticed significant increase in pain on  night. She ran out of meloxicam and switch to diclofenac. She is also status post right total knee arthroplasty done March of this year. She was quite proud of herself, having been back to the gym twice a week to work on stationary bike and strengthening. She went back to using a cane since the left knee flared up. Left knee pain is diffuse without radiation. PHYSICAL EXAM:  Vitals: Ht 5' 4\" (1.626 m)   Wt 228 lb (103.4 kg)   BMI 39.14 kg/m²   Body mass index is 39.14 kg/m². 74y.o. year old F in no acute distress. Obese, but otherwise appears well. Ambulates with a limp on the left, cane in the contralateral hand. Left knee with neutral alignment. Skin warm, dry without effusion. Mild medial joint line tenderness. Range of motion limited due to pain, lacking a few degrees of extension with flexion 210 today. Motor 5/5. No distal edema. IMAGING:  Radiographs: Prior x-rays reviewed which reveal left knee with severe loss of medial joint space, moderate patellofemoral osteoarthritis. ASSESSMENT/PLAN:  1. Primary osteoarthritis of left knee  -     methylPREDNISolone (MEDROL DOSEPACK) 4 mg tablet; Per dose pack instructions, Normal, Disp-1 Dose Pack, R-0  -     meloxicam (Mobic) 15 mg tablet; Take 1 Tablet by mouth daily. , Normal, Disp-30 Tablet, R-1  -     bupivacaine HCl (MARCAINE) 0.25 % (2.5 mg/mL) injection 12.5 mg; 12.5 mg (5 mL), Other, ONCE, 1 dose, On 22 at 1800  -     triamcinolone acetonide (KENALOG-40) 40 mg/mL injection 40 mg; 40 mg, Intra artICUlar, ONCE, 1 dose, On 22 at 1800    The xray and exam findings were discussed with the patient today.   Severe left knee osteoarthritis with acute exacerbation. We discussed continuation of conservative treatment measures to include anti-inflammatories, Medrol Dosepak, and injections. She has an upcoming trip to [place] in October. We will move forward with cortisone injection today, and I will refill her Meloxicam to take daily. If pain persists closer to her trip in October, she will do a 6-day steroid taper for acute relief. If pain persists after that, she understands she has to wait 12 weeks to discuss surgical intervention. Follow-up as needed. Regardless, we should see her in March of next year for routine 1 year postop visit right knee. Discussed risks/benefits of cortisone injection and patient gave verbal consent. Under sterile conditions, the left knee was injected with 5cc 0.25% Bupivacaine and 1cc 40mg Triamcinolone Acetonide (Kenalog) intra-articularly, tolerated the procedure well. Return if symptoms worsen or fail to improve. Review Of Systems     Patient denies any recent fever, chills, nausea, vomiting, chest pain, or shortness of breath. Allergies   Allergen Reactions    Aspirin Palpitations    Codeine-Guaifenesin Other (comments)     CAUSES HEART PALPITATIONS AT NIGHT    Milk Prot-Turm-Pepper-Pineappl Diarrhea     CAN EAT ICE CREAM & CHEESE & BUTTER. DRINKS LACTAID    Minocycline Other (comments)     Told by MD she is allergic. She does not know the reaction. Current Outpatient Medications   Medication Sig    ibuprofen (AdviL) 200 mg tablet Take 400 mg by mouth every six (6) hours as needed for Pain. diclofenac (VOLTAREN) 1 % gel Apply  to affected area four (4) times daily. methylPREDNISolone (MEDROL DOSEPACK) 4 mg tablet Per dose pack instructions    meloxicam (Mobic) 15 mg tablet Take 1 Tablet by mouth daily. metFORMIN ER (GLUCOPHAGE XR) 500 mg tablet Take 1,000 mg by mouth daily.     cholecalciferol (VITAMIN D3) 1,000 unit tablet cholecalciferol (vitamin D3) 1,000 unit (25 mcg) tablet    pravastatin (PRAVACHOL) 10 mg tablet Take 10 mg by mouth. No current facility-administered medications for this visit. Past Medical History:   Diagnosis Date    Arthritis     BOTH HIPS PAINFUL    Chronic pain     Diabetes (HCC)     PRE    Difficulty sleeping     Frequent urination     GERD (gastroesophageal reflux disease)     High cholesterol     Spinal stenosis, lumbar         Past Surgical History:   Procedure Laterality Date    HX HEENT  1980s    VOCAL CORD POLYP EXCISED    HX HIP REPLACEMENT Right 05/2012    HX ORTHOPAEDIC  1990s    FOOT SURGERY    HX ORTHOPAEDIC      back surgery    HX TONSILLECTOMY  CHILDHOOD       Family History   Problem Relation Age of Onset    Other Mother         CARDIAC ARREST DURING HIATAL HERNIA/REFLUX SURGERY;  ALSO, DOES NOT EVARISTO IV DYE    Hypertension Mother     Heart Disease Mother     Kidney Disease Mother     Anesth Problems Mother         UNSURE WHAT THE PROBLEM IS. IT WAS AFTER SURGERY IN THE MID 80'S.     Hypertension Father     Dementia Father     Heart Disease Father     Elevated Lipids Father         Social History     Socioeconomic History    Marital status:      Spouse name: Not on file    Number of children: Not on file    Years of education: Not on file    Highest education level: Not on file   Occupational History    Not on file   Tobacco Use    Smoking status: Never    Smokeless tobacco: Never   Substance and Sexual Activity    Alcohol use: Yes     Comment: RARE ALCOHOL    Drug use: No    Sexual activity: Not Currently   Other Topics Concern    Not on file   Social History Narrative    Not on file     Social Determinants of Health     Financial Resource Strain: Not on file   Food Insecurity: Not on file   Transportation Needs: Not on file   Physical Activity: Not on file   Stress: Not on file   Social Connections: Not on file   Intimate Partner Violence: Not on file   Housing Stability: Not on file       Orders Placed This Encounter    methylPREDNISolone (MEDROL DOSEPACK) 4 mg tablet     Sig: Per dose pack instructions     Dispense:  1 Dose Pack     Refill:  0    meloxicam (Mobic) 15 mg tablet     Sig: Take 1 Tablet by mouth daily. Dispense:  30 Tablet     Refill:  1    bupivacaine HCl (MARCAINE) 0.25 % (2.5 mg/mL) injection 12.5 mg    triamcinolone acetonide (KENALOG-40) 40 mg/mL injection 40 mg      Fer Pratt M.D. was available for immediate consultation as the supervising physician. An electronic signature was used to authenticate this note.   -- Win Townsend PA-C

## 2022-08-22 NOTE — LETTER
8/22/2022    Patient: Juliet Powell   YOB: 1955   Date of Visit: 8/22/2022     Konrad Goldberg MD  [address] [place] [phone]  Via Fax: [phone]    Dear Konrad Goldberg MD,      Thank you for referring Ms. Seema Mackey to [facility] for evaluation. My notes for this consultation are attached. If you have questions, please do not hesitate to call me. I look forward to following your patient along with you.       Sincerely,    Tyson Ruiz MD

## 2022-10-20 DIAGNOSIS — Z96.651 STATUS POST RIGHT KNEE REPLACEMENT: Primary | ICD-10-CM

## 2022-10-20 RX ORDER — AMOXICILLIN 500 MG/1
CAPSULE ORAL
Qty: 4 CAPSULE | Refills: 1 | Status: SHIPPED | OUTPATIENT
Start: 2022-10-20

## 2022-10-20 NOTE — TELEPHONE ENCOUNTER
Patient called stating that she needs a prescription for amoxicillin for upcoming colonoscopy, per instructions from gastroenterologist. Patient verified pharmacy.

## 2022-11-04 DIAGNOSIS — M17.12 PRIMARY OSTEOARTHRITIS OF LEFT KNEE: Primary | ICD-10-CM

## 2022-11-04 RX ORDER — MELOXICAM 15 MG/1
15 TABLET ORAL DAILY
Qty: 30 TABLET | Refills: 1 | Status: SHIPPED | OUTPATIENT
Start: 2022-11-04

## 2022-12-16 ENCOUNTER — OFFICE VISIT (OUTPATIENT)
Dept: ORTHOPEDIC SURGERY | Age: 67
End: 2022-12-16
Payer: MEDICARE

## 2022-12-16 VITALS — BODY MASS INDEX: 39.14 KG/M2 | HEIGHT: 64 IN

## 2022-12-16 DIAGNOSIS — M17.12 PRIMARY OSTEOARTHRITIS OF LEFT KNEE: Primary | ICD-10-CM

## 2022-12-16 DIAGNOSIS — Z96.651 STATUS POST RIGHT KNEE REPLACEMENT: ICD-10-CM

## 2022-12-16 RX ORDER — METHYLPREDNISOLONE ACETATE 40 MG/ML
40 INJECTION, SUSPENSION INTRA-ARTICULAR; INTRALESIONAL; INTRAMUSCULAR; SOFT TISSUE ONCE
Status: COMPLETED | OUTPATIENT
Start: 2022-12-16 | End: 2022-12-16

## 2022-12-16 RX ORDER — BUPIVACAINE HYDROCHLORIDE 7.5 MG/ML
5 INJECTION, SOLUTION EPIDURAL; RETROBULBAR ONCE
Status: COMPLETED | OUTPATIENT
Start: 2022-12-16 | End: 2022-12-16

## 2022-12-16 RX ADMIN — METHYLPREDNISOLONE ACETATE 40 MG: 40 INJECTION, SUSPENSION INTRA-ARTICULAR; INTRALESIONAL; INTRAMUSCULAR; SOFT TISSUE at 09:13

## 2022-12-16 RX ADMIN — BUPIVACAINE HYDROCHLORIDE 37.5 MG: 7.5 INJECTION, SOLUTION EPIDURAL; RETROBULBAR at 09:12

## 2022-12-16 NOTE — LETTER
12/16/2022    Patient: Svetlana Lynch   YOB: 1955   Date of Visit: 12/16/2022     Penny Soler MD  [address] [place] [id number]  Via Fax: [phone]    Dear Penny Soler MD,      Thank you for referring Ms. Seema Mackey to [facility] for evaluation. My notes for this consultation are attached. If you have questions, please do not hesitate to call me. I look forward to following your patient along with you.       Sincerely,    James Malave PA-C

## 2022-12-16 NOTE — PROGRESS NOTES
Seema Mackey (: 1955) is a 69 y.o. female, patient, here for evaluation of the following chief complaint(s):  Knee Pain (Left )       SUBJECTIVE/OBJECTIVE:  Seema Mackey presents today complaining of left knee pain. Known left knee osteoarthritis. She had an injection in August which provided significant, long-lasting relief until recently. She is here to discuss options. Knee pain limits her activity, especially with prolonged sitting and long distance walking. Stiffness at start up. In addition, she is 9 months status post right total knee arthroplasty. She is having a tight \"band\" like sensation in the knee. She is not currently working out at the gym due to [employer] concerns. States the knee does not hurt, but does not feel \"normal\". She is taking meloxicam daily. She also uses diclofenac gel. PHYSICAL EXAM:  Vitals: Ht 5' 4\" (1.626 m)   BMI 39.14 kg/m²   Body mass index is 39.14 kg/m². 69y.o. year old F in no acute distress. Obese, but otherwise appears well. Ambulates with a limp on the left, no assistive devices today. Neutral alignment of the left knee. Skin warm, dry without effusion. Mild medial joint line tenderness to palpation. She lacks a few degrees of extension with flexion to 110 today. Motor 5/5. No distal edema. IMAGING:  Radiographs: Prior x-rays reviewed which reveal left knee severe loss of medial joint space, moderate patellofemoral osteoarthritis. ASSESSMENT/PLAN:  1. Primary osteoarthritis of left knee  -     bupivacaine (PF) (MARCAINE) 0.75 % (7.5 mg/mL) injection 37.5 mg; 37.5 mg (5 mL), Intra artICUlar, ONCE, 1 dose, On 22 at 1000  -     methylPREDNISolone acetate (DEPO-MEDROL) 40 mg/mL injection 40 mg; 40 mg, Intra artICUlar, ONCE, 1 dose, On 22 at 1000  2. Status post right knee replacement    The xray and exam findings were discussed with the patient today. Severe left knee osteoarthritis.   We discussed continued conservative measures versus operative intervention. She is not quite satisfied with her right knee yet, she will be 1 year from surgery in March. Until she is confident with the right knee, she would like to hold off on left knee arthroplasty. In the interim, she elects for repeat cortisone injection. She will follow-up in 3 months for routine 1 year postop visit of the right knee, and consider repeat injection in the left knee at that time as needed versus operative discussion. Hopefully after the injection, she is able to wean from meloxicam for short time. Discussed risks/benefits of cortisone injection and patient gave verbal consent. Under sterile conditions, the left knee was injected with 5cc 0.75% Bupivacaine and 1cc 40mg Depo Medrol intra-articularly, tolerated the procedure well. Return if symptoms worsen or fail to improve. Review Of Systems  ROS    Positive for: Musculoskeletal  Last edited by Carlyn Keane on 12/16/2022  9:05 AM.         Patient denies any recent fever, chills, nausea, vomiting, chest pain, or shortness of breath. Allergies   Allergen Reactions    Aspirin Palpitations    Codeine-Guaifenesin Other (comments)     CAUSES HEART PALPITATIONS AT NIGHT    Milk Prot-Turm-Pepper-Pineappl Diarrhea     CAN EAT ICE CREAM & CHEESE & BUTTER. DRINKS LACTAID    Minocycline Other (comments)     Told by MD she is allergic. She does not know the reaction. Current Outpatient Medications   Medication Sig    meloxicam (MOBIC) 15 mg tablet Take 1 Tablet by mouth daily. amoxicillin (AMOXIL) 500 mg capsule Take 4 capsules prior to procedure. diclofenac (VOLTAREN) 1 % gel Apply  to affected area four (4) times daily. methylPREDNISolone (MEDROL DOSEPACK) 4 mg tablet Per dose pack instructions    meloxicam (Mobic) 15 mg tablet Take 1 Tablet by mouth daily. metFORMIN ER (GLUCOPHAGE XR) 500 mg tablet Take 1,000 mg by mouth daily.     cholecalciferol (VITAMIN D3) 1,000 unit tablet cholecalciferol (vitamin D3) 1,000 unit (25 mcg) tablet    pravastatin (PRAVACHOL) 10 mg tablet Take 10 mg by mouth. No current facility-administered medications for this visit. Past Medical History:   Diagnosis Date    Arthritis     BOTH HIPS PAINFUL    Chronic pain     Diabetes (HCC)     PRE    Difficulty sleeping     Frequent urination     GERD (gastroesophageal reflux disease)     High cholesterol     Spinal stenosis, lumbar         Past Surgical History:   Procedure Laterality Date    HX HEENT  1980s    VOCAL CORD POLYP EXCISED    HX HIP REPLACEMENT Right 05/2012    HX ORTHOPAEDIC  1990s    FOOT SURGERY    HX ORTHOPAEDIC      back surgery    HX TONSILLECTOMY  CHILDHOOD       Family History   Problem Relation Age of Onset    Other Mother         CARDIAC ARREST DURING HIATAL HERNIA/REFLUX SURGERY;  ALSO, DOES NOT EVARISTO IV DYE    Hypertension Mother     Heart Disease Mother     Kidney Disease Mother     Anesth Problems Mother         UNSURE WHAT THE PROBLEM IS. IT WAS AFTER SURGERY IN THE MID 80'S.     Hypertension Father     Dementia Father     Heart Disease Father     Elevated Lipids Father         Social History     Socioeconomic History    Marital status:      Spouse name: Not on file    Number of children: Not on file    Years of education: Not on file    Highest education level: Not on file   Occupational History    Not on file   Tobacco Use    Smoking status: Never    Smokeless tobacco: Never   Substance and Sexual Activity    Alcohol use: Yes     Comment: RARE ALCOHOL    Drug use: No    Sexual activity: Not Currently   Other Topics Concern    Not on file   Social History Narrative    Not on file     Social Determinants of Health     Financial Resource Strain: Not on file   Food Insecurity: Not on file   Transportation Needs: Not on file   Physical Activity: Not on file   Stress: Not on file   Social Connections: Not on file   Intimate Partner Violence: Not on file Housing Stability: Not on file       Orders Placed This Encounter    bupivacaine (PF) (MARCAINE) 0.75 % (7.5 mg/mL) injection 37.5 mg    methylPREDNISolone acetate (DEPO-MEDROL) 40 mg/mL injection 40 mg      Fer Washington M.D. was available for immediate consultation as the supervising physician. An electronic signature was used to authenticate this note.   -- Eryn Mann PA-C

## 2023-01-05 DIAGNOSIS — M17.12 PRIMARY OSTEOARTHRITIS OF LEFT KNEE: ICD-10-CM

## 2023-01-08 RX ORDER — MELOXICAM 15 MG/1
TABLET ORAL
Qty: 30 TABLET | Refills: 1 | Status: SHIPPED | OUTPATIENT
Start: 2023-01-08

## 2023-02-24 ENCOUNTER — OFFICE VISIT (OUTPATIENT)
Dept: ORTHOPEDIC SURGERY | Age: 68
End: 2023-02-24
Payer: MEDICARE

## 2023-02-24 VITALS — WEIGHT: 237 LBS | BODY MASS INDEX: 40.46 KG/M2 | HEIGHT: 64 IN

## 2023-02-24 DIAGNOSIS — Z96.641 STATUS POST RIGHT HIP REPLACEMENT: ICD-10-CM

## 2023-02-24 DIAGNOSIS — M51.36 DEGENERATIVE DISC DISEASE, LUMBAR: Primary | ICD-10-CM

## 2023-02-24 DIAGNOSIS — E66.01 OBESITY, CLASS III, BMI 40-49.9 (MORBID OBESITY) (HCC): ICD-10-CM

## 2023-02-24 RX ORDER — PREDNISONE 5 MG/1
TABLET ORAL
Qty: 48 TABLET | Refills: 0 | Status: SHIPPED | OUTPATIENT
Start: 2023-02-24

## 2023-02-24 NOTE — LETTER
2/24/2023    Patient: Paty Garcia   YOB: 1955   Date of Visit: 2/24/2023     Alexis Thomas MD  [facility] [address] [phone]  Via Fax: [phone]    Dear Alexis Thomas MD,      Thank you for referring Ms. Seema Mackey to [place] for evaluation. My notes for this consultation are attached. If you have questions, please do not hesitate to call me. I look forward to following your patient along with you.       Sincerely,    Alverna Canavan, MD

## 2023-02-24 NOTE — PROGRESS NOTES
Seema Mackey (: 1955) is a 69 y.o. female, patient, here for evaluation of the following chief complaint(s):  Hip Pain (Right )       SUBJECTIVE/OBJECTIVE:  Seema Mackey presents today complaining of right lower back and right gluteal pain. Right total hip arthroplasty ~. Denies groin pain. Pain is worse with prolonged standing and walking, though she is still able to walk >2 miles with intermittent rest.  Wakening night pain is present. Pain does not radiate down her right leg. History lumbar spine fusion L4-5  ~ 10 years ago. PHYSICAL EXAM:  Vitals: Ht 5' 4\" (1.626 m)   Wt 237 lb (107.5 kg)   BMI 40.68 kg/m²   Body mass index is 40.68 kg/m². 69y.o. year old F in no acute distress. Ambulates with a limp on the right. Low back discomfort with lumbar extension. Mild TTP right greater trochanter, different than the day to day pain she's been having. .  No nerve tension signs in the left leg, negative straight leg raise. Negative Stinchfield with painless passive right hip ROM. Motor 5/5. No distal edema. IMAGING:  Radiographs: XR Results (most recent):  Results from Appointment encounter on 23    XR HIP RT W OR WO PELV 2-3 VWS    Narrative  3 x-ray views of right hip including AP pelvis, AP and frog lateral images demonstrate satisfactory position and alignment of cementless total hip components with signs of ingrowth present. No wear or lysis. No loosening. Hardware present from previous L4-5 lumbar fusion. Severe degenerative disc disease evident above and below the fusion. ASSESSMENT/PLAN:  1. Degenerative disc disease, lumbar  -     REFERRAL TO PHYSICAL THERAPY  -     predniSONE (STERAPRED) 5 mg dose pack; See administration instruction per 5mg dose pack (12 days), Normal, Disp-48 Tablet, R-0  2. Status post right hip replacement  -     XR HIP RT W OR WO PELV 2-3 VWS; Future  3.  Obesity, Class III, BMI 40-49.9 (morbid obesity) ([facility] [address]    The xray and exam findings were discussed with the patient today. Severe degenerative disc disease above and below prior lumbar fusion. I would recommend formal PT to start, coupled with steroid taper. After steroid, restart Meloxicam.  Keep appt scheduled with Mary Pierce in 1 month to determine long term treatment plan. No follow-ups on file. Review Of Systems  ROS    Positive for: Musculoskeletal  Last edited by Salma Kelley on 2/24/2023 11:17 AM.         Patient denies any recent fever, chills, nausea, vomiting, chest pain, or shortness of breath. Allergies   Allergen Reactions    Aspirin Palpitations    Codeine-Guaifenesin Other (comments)     CAUSES HEART PALPITATIONS AT NIGHT    Milk Prot-Turm-Pepper-Pineappl Diarrhea     CAN EAT ICE CREAM & CHEESE & BUTTER. DRINKS LACTAID    Minocycline Other (comments)     Told by MD she is allergic. She does not know the reaction. Current Outpatient Medications   Medication Sig    MULTIVITAMIN PO Take 1 Tablet by mouth daily. predniSONE (STERAPRED) 5 mg dose pack See administration instruction per 5mg dose pack (12 days)    diclofenac (VOLTAREN) 1 % gel Apply  to affected area four (4) times daily. metFORMIN ER (GLUCOPHAGE XR) 500 mg tablet Take 1,000 mg by mouth daily. pravastatin (PRAVACHOL) 10 mg tablet Take 10 mg by mouth.    meloxicam (MOBIC) 15 mg tablet TAKE 1 TABLET BY MOUTH EVERY DAY    amoxicillin (AMOXIL) 500 mg capsule Take 4 capsules prior to procedure. cholecalciferol (VITAMIN D3) 1,000 unit tablet cholecalciferol (vitamin D3) 1,000 unit (25 mcg) tablet     No current facility-administered medications for this visit.        Past Medical History:   Diagnosis Date    Arthritis     BOTH HIPS PAINFUL    Chronic pain     Diabetes ([address]     PRE    Difficulty sleeping     Frequent urination     GERD (gastroesophageal reflux disease)     High cholesterol     Spinal stenosis, lumbar         Past Surgical History: Procedure Laterality Date    HX HEENT  1980s    VOCAL CORD POLYP EXCISED    HX HIP REPLACEMENT Right 05/2012    HX ORTHOPAEDIC  1990s    FOOT SURGERY    HX ORTHOPAEDIC      back surgery    HX TONSILLECTOMY  CHILDHOOD       Family History   Problem Relation Age of Onset    Other Mother         CARDIAC ARREST DURING HIATAL HERNIA/REFLUX SURGERY;  ALSO, DOES NOT EVARISTO IV DYE    Hypertension Mother     Heart Disease Mother     Kidney Disease Mother     Anesth Problems Mother         UNSURE WHAT THE PROBLEM IS. IT WAS AFTER SURGERY IN THE MID 80'S.     Hypertension Father     Dementia Father     Heart Disease Father     Elevated Lipids Father         Social History     Socioeconomic History    Marital status:      Spouse name: Not on file    Number of children: Not on file    Years of education: Not on file    Highest education level: Not on file   Occupational History    Not on file   Tobacco Use    Smoking status: Never    Smokeless tobacco: Never   Substance and Sexual Activity    Alcohol use: Yes     Comment: RARE ALCOHOL    Drug use: No    Sexual activity: Not Currently   Other Topics Concern    Not on file   Social History Narrative    Not on file     Social Determinants of Health     Financial Resource Strain: Not on file   Food Insecurity: Not on file   Transportation Needs: Not on file   Physical Activity: Not on file   Stress: Not on file   Social Connections: Not on file   Intimate Partner Violence: Not on file   Housing Stability: Not on file       Orders Placed This Encounter    XR HIP RT W OR WO PELV 2-3 VWS     Standing Status:   Future     Number of Occurrences:   1     Standing Expiration Date:   2/25/2024    REFERRAL TO PHYSICAL THERAPY     Referral Priority:   Routine     Referral Type:   PT/OT/ST     Referral Reason:   Specialty Services Required     Number of Visits Requested:   1    predniSONE (STERAPRED) 5 mg dose pack     Sig: See administration instruction per 5mg dose pack (12 days) Dispense:  48 Tablet     Refill:  0      Fer Weaver M.D. was available for immediate consultation as the supervising physician. An electronic signature was used to authenticate this note.   -- YAIR GalvinC

## 2023-02-27 ENCOUNTER — OFFICE VISIT (OUTPATIENT)
Dept: ORTHOPEDIC SURGERY | Age: 68
End: 2023-02-27
Payer: MEDICARE

## 2023-02-27 DIAGNOSIS — G89.29 CHRONIC RIGHT-SIDED LOW BACK PAIN WITHOUT SCIATICA: ICD-10-CM

## 2023-02-27 DIAGNOSIS — M51.36 DEGENERATIVE DISC DISEASE, LUMBAR: Primary | ICD-10-CM

## 2023-02-27 DIAGNOSIS — M54.50 CHRONIC RIGHT-SIDED LOW BACK PAIN WITHOUT SCIATICA: ICD-10-CM

## 2023-02-27 PROCEDURE — 97161 PT EVAL LOW COMPLEX 20 MIN: CPT | Performed by: PHYSICAL THERAPIST

## 2023-02-27 PROCEDURE — 97110 THERAPEUTIC EXERCISES: CPT | Performed by: PHYSICAL THERAPIST

## 2023-02-27 PROCEDURE — 97140 MANUAL THERAPY 1/> REGIONS: CPT | Performed by: PHYSICAL THERAPIST

## 2023-02-27 NOTE — PROGRESS NOTES
Seema Mackey (: 1955) is a 69 y.o. Back Pain       Patient Name: Sherman Flynn  FMWS:  : 1955  [x]  Patient  Verified  Payor: Aristides  / Plan: [place] MED[place] & [place] / Product Type: Medicare /    Calista Talisha,*  Total Treatment Time (min): 45  Total Timed Codes (min): 45  1:1 Treatment Time ( [address] only): 39  Visit #: 1  20      Treatment Area: Lumbar    ASSESSMENT/PLAN:  Below is the assessment and plan developed based on review of pertinent history, physical exam, labs, studies, and medications. Patient comes in today with a diagnosis of low back pain and presents to physical therapy deficits including strength, flexibility, hypertonicity, hypomobility, and biomechanics. She will benefit from a physical therapy program to address above-mentioned deficits. Short-term goals: To become independent with today's prescribed home exercise program in 1 week. Long-term goals: To progress with a physical therapy program to the patient demonstrates improved right hip flexibility and core strength as well as improved biomechanics allowing her to tolerate standing activities of up to 30 minutes reporting less than 3/10 low back and hip pain in the next 4 to 6 weeks. Additionally, patient will score a clinically significant improvement of 15 percentage points on the modified Oswestry scale in the next 4 to 6 weeks. Patient will be seen twice a week for up to 20 visits  with focus on progressive restoration of range of motion and strength, balance, and functional mobility. Therapeutic applications will include but are not limited to:Manual therapy, joint mobilization, myofascial release, therapeutic exercises. Modalities including ultrasound and electric stimulation heat and ice. Kinesiotape and Girard taping for joint reeducation and approximation of tissue for neuromuscular reeducation. 1. Degenerative disc disease, lumbar  2.  Chronic right-sided low back pain without sciatica    Return in about 3 days (around 3/2/2023). SUBJECTIVE:  HPI  Patient comes into today complaining of a 4-month history of low back and right hip pain. She does have a history of 2012 lumbar fusion and 2013 right total hip replacement. She locates the pain to her middle low back and pain that sometimes refers into her right buttock. Symptoms are aggravated with standing and lifting activities. She gets relief with sitting. She is on day 4 of a 12-day steroid pack. She has not noticed an improvement with the medication. Patient has been walking less this winter. She plans to resume a regular walking program of 2 miles several days a week once the weather improves. She also has a gym membership and is interested in doing some swimming. Please see patient's medical chart for detailed list of current medications as well as significant past medical history. OBJECTIVE:  Evaluation (20 minutes)  Patient comes in today demonstrating nonantalgic gait. She is able to raise on heels and toes with balance assist.  Lumbar range of motion is globally restricted and somewhat painful into flexion. Lower quarter scan reveals no deficits with strength or sensation throughout bilateral lower extremities with exception of right hip flexion graded 4/5. Diminished bilateral knee reflex. 2/4 bilateral ankle reflex. Babinski's are downgoing bilaterally. Patient test negative for straight leg raise, discogenic, and SI provocative test bilaterally. Patient has more difficulty with supine straight leg raise on the right side. She test poor with basic phase 1 core stabilization testing. Relative hypertonicity to right lumbar paraspinal.  Hypomobility to lower and upper lumbar region. Flexibility restriction to bilateral hamstring and hip flexor; right more than left.     Modified Oswestry scale: 30%    Manual(mins 10)  Patient positioned in left side-lying with pelvic rock, lumbar gapping, and soft tissue massage to right lumbar paraspinal.  Right hamstring stretch. Right long axis lower extremity traction. Exercise(mins 15)  With today's interventions we initiated exercises for flexibility, core strengthening, hip strengthening, and biomechanics for patient perform at home on a daily basis. Today's exercises include supine bridge, supine pelvic tilt, supine marches, standing hip flexor stretch, seated hamstring stretch, and cervical retraction. An electronic signature was used to authenticate this note.   -- Nina Romero, PT

## 2023-03-06 ENCOUNTER — OFFICE VISIT (OUTPATIENT)
Dept: ORTHOPEDIC SURGERY | Age: 68
End: 2023-03-06

## 2023-03-06 DIAGNOSIS — M51.36 DEGENERATIVE DISC DISEASE, LUMBAR: Primary | ICD-10-CM

## 2023-03-06 DIAGNOSIS — G89.29 CHRONIC RIGHT-SIDED LOW BACK PAIN WITHOUT SCIATICA: ICD-10-CM

## 2023-03-06 DIAGNOSIS — M54.50 CHRONIC RIGHT-SIDED LOW BACK PAIN WITHOUT SCIATICA: ICD-10-CM

## 2023-03-07 NOTE — PROGRESS NOTES
Seema Mackey (: 1955) is a 69 y.o. Back Pain       Patient Name: Marjan Lerner  Date:3/7/2023  : 1955  [x]  Patient  Verified  Payor: Sirisha Cutler / Plan: [place] MEDICARE PART A & B / Product Type: Medicare /    Creta Medicus,*  Total Treatment Time (min): 45  Total Timed Codes (min): 45  1:1 Treatment Time ( only): 39  Visit #: 2 of 20      Treatment Area: Lumbar    ASSESSMENT/PLAN:  Below is the assessment and plan developed based on review of pertinent history, physical exam, labs, studies, and medications. Patient tolerates initial exercise progression well. She is more tender and hypertonic to her right lumbar paraspinal.  She will be participating in choir practice later this week which typically aggravates her back. We will assess her tolerance to this on next visit. Continue with current plan of care and progress towards long-term goals. 1. Degenerative disc disease, lumbar  2. Chronic right-sided low back pain without sciatica    Return in about 3 days (around 3/9/2023). SUBJECTIVE:  HPI  Patient has been doing her exercises which have been tolerated well. Back symptoms are unchanged. Still painful on the right side with prolonged standing activities. OBJECTIVE:    Manual(mins 15)  Patient positioned in left side-lying with pelvic rock, lumbar gapping, and soft tissue massage to right lumbar paraspinal.  Right hamstring stretch. Right long axis lower extremity traction. Right knee-to-chest stretch with paraspinal release. Exercise(mins 30)  Today's exercises include supine bridge, supine pelvic tilt, supine marches, standing hip flexor stretch, shuttle leg press, hip flexor hang, NuStep, seated hamstring stretch, and seated flexion rolls. An electronic signature was used to authenticate this note.   -- Priyank Larson PT

## 2023-03-09 ENCOUNTER — OFFICE VISIT (OUTPATIENT)
Dept: ORTHOPEDIC SURGERY | Age: 68
End: 2023-03-09

## 2023-03-09 DIAGNOSIS — G89.29 CHRONIC RIGHT-SIDED LOW BACK PAIN WITHOUT SCIATICA: ICD-10-CM

## 2023-03-09 DIAGNOSIS — M51.36 DEGENERATIVE DISC DISEASE, LUMBAR: Primary | ICD-10-CM

## 2023-03-09 DIAGNOSIS — M54.50 CHRONIC RIGHT-SIDED LOW BACK PAIN WITHOUT SCIATICA: ICD-10-CM

## 2023-03-13 NOTE — PROGRESS NOTES
Seema Mackey (: 1955) is a 69 y.o. Back Pain       Patient Name: Salvatore Perez  : 1955  [x]  Patient  Verified  Payor: Yumi Bradley / Plan: [place] MEDICARE PART A & B / Product Type: Medicare /    Milady Painter,*  Total Treatment Time (min): 45  Total Timed Codes (min): 45  1:1 Treatment Time ( [address] only): 39  Visit #: 2 of 20      Treatment Area: Lumbar    ASSESSMENT/PLAN:  Below is the assessment and plan developed based on review of pertinent history, physical exam, labs, studies, and medications. Progressing with therapeutic and core exercise program.  Right lumbar ps tenderness and hypertonicity. Continue with current plan of care and progress towards long-term goals. 1. Degenerative disc disease, lumbar  2. Chronic right-sided low back pain without sciatica    Return in about 5 days (around 3/14/2023). SUBJECTIVE:  HPI  Back has been better. Able to tolerate standing activities better. OBJECTIVE:    Manual(mins 15)  Patient positioned in left side-lying with pelvic rock, lumbar gapping, and soft tissue massage to right lumbar paraspinal.  Right hamstring stretch. Right long axis lower extremity traction. Right knee-to-chest stretch with paraspinal release. Exercise(mins 30)  Today's exercises include supine bridge, supine pelvic tilt, supine marches, standing hip flexor stretch, shuttle leg press, hip flexor hang, NuStep, seated hamstring stretch,lat pull down marching, and seated flexion rolls. An electronic signature was used to authenticate this note.   -- Cullen Delarosa, PT

## 2023-03-14 ENCOUNTER — OFFICE VISIT (OUTPATIENT)
Dept: ORTHOPEDIC SURGERY | Age: 68
End: 2023-03-14
Payer: MEDICARE

## 2023-03-14 DIAGNOSIS — M54.50 CHRONIC RIGHT-SIDED LOW BACK PAIN WITHOUT SCIATICA: ICD-10-CM

## 2023-03-14 DIAGNOSIS — M51.36 DEGENERATIVE DISC DISEASE, LUMBAR: Primary | ICD-10-CM

## 2023-03-14 DIAGNOSIS — G89.29 CHRONIC RIGHT-SIDED LOW BACK PAIN WITHOUT SCIATICA: ICD-10-CM

## 2023-03-14 PROCEDURE — 97140 MANUAL THERAPY 1/> REGIONS: CPT

## 2023-03-14 PROCEDURE — 97110 THERAPEUTIC EXERCISES: CPT

## 2023-03-14 NOTE — PROGRESS NOTES
Seema Mackey (: 1955) is a 69 y.o. Back Pain       Patient Name: Ciaran Nagy  : 1955  [x]  Patient  Verified  Payor: Tiki Levi / Plan: [place] MEDICARE PART A & B / Product Type: Medicare /    Santana Marmolejo,*  Total Treatment Time (min): 45  Total Timed Codes (min): 45  1:1 Treatment Time ( [address] only): 39  Visit #: 4 of 20      Treatment Area: Lumbar    ASSESSMENT/PLAN:  Below is the assessment and plan developed based on review of pertinent history, physical exam, labs, studies, and medications. Patient with improved core activation after verbal cueing to breathe out and brace throughout exercises. Continues to have decreased tolerance to standing and ambulating due to LBP on left lumbar spine. Continue to work on strength, flexibility, hypertonicity, hypomobility, and biomechanics to return to prior activities including walking 2 miles a day. 1. Degenerative disc disease, lumbar  2. Chronic right-sided low back pain without sciatica    Return in about 2 days (around 3/16/2023) for Continue therapy for back pain. SUBJECTIVE:  HPI    Stated that she feels slightly better since starting therapy. Reports increased soreness today because she was very active yesterday and had pain while sitting. OBJECTIVE:    Manual(mins 15)  Patient positioned in left side-lying with A/P mobilizations, lumbar gapping, and soft tissue massage to right lumbar paraspinal and piriformis. Bilateral hamstring stretch. Bilateral long axis lower extremity traction. Right knee-to-chest stretch with paraspinal release. Function/ Strength: decreased hip flexor strength with discomfort after performing supine marches    Exercise(mins 30)  Today's exercises include supine bridge, supine pelvic tilt, supine marches, standing hip flexor stretch, shuttle leg press, hip flexor hang, NuStep, seated hamstring stretch,lat pull down marching, and seated flexion rolls.     An electronic signature was used to authenticate this note.   -- Co-treated by SEJAL Simmons

## 2023-03-17 ENCOUNTER — OFFICE VISIT (OUTPATIENT)
Dept: ORTHOPEDIC SURGERY | Age: 68
End: 2023-03-17
Payer: MEDICARE

## 2023-03-17 DIAGNOSIS — M17.12 PRIMARY OSTEOARTHRITIS OF LEFT KNEE: Primary | ICD-10-CM

## 2023-03-17 PROCEDURE — 1090F PRES/ABSN URINE INCON ASSESS: CPT | Performed by: PHYSICIAN ASSISTANT

## 2023-03-17 PROCEDURE — G8427 DOCREV CUR MEDS BY ELIG CLIN: HCPCS | Performed by: PHYSICIAN ASSISTANT

## 2023-03-17 PROCEDURE — G8432 DEP SCR NOT DOC, RNG: HCPCS | Performed by: PHYSICIAN ASSISTANT

## 2023-03-17 PROCEDURE — 99213 OFFICE O/P EST LOW 20 MIN: CPT | Performed by: PHYSICIAN ASSISTANT

## 2023-03-17 PROCEDURE — G8417 CALC BMI ABV UP PARAM F/U: HCPCS | Performed by: PHYSICIAN ASSISTANT

## 2023-03-17 PROCEDURE — G8536 NO DOC ELDER MAL SCRN: HCPCS | Performed by: PHYSICIAN ASSISTANT

## 2023-03-17 PROCEDURE — 1123F ACP DISCUSS/DSCN MKR DOCD: CPT | Performed by: PHYSICIAN ASSISTANT

## 2023-03-17 PROCEDURE — G8399 PT W/DXA RESULTS DOCUMENT: HCPCS | Performed by: PHYSICIAN ASSISTANT

## 2023-03-17 PROCEDURE — 1101F PT FALLS ASSESS-DOCD LE1/YR: CPT | Performed by: PHYSICIAN ASSISTANT

## 2023-03-17 PROCEDURE — 3017F COLORECTAL CA SCREEN DOC REV: CPT | Performed by: PHYSICIAN ASSISTANT

## 2023-03-17 PROCEDURE — 20610 DRAIN/INJ JOINT/BURSA W/O US: CPT | Performed by: PHYSICIAN ASSISTANT

## 2023-03-17 RX ORDER — BUPIVACAINE HYDROCHLORIDE 5 MG/ML
5 INJECTION, SOLUTION PERINEURAL ONCE
Status: COMPLETED | OUTPATIENT
Start: 2023-03-17 | End: 2023-03-17

## 2023-03-17 RX ORDER — TRIAMCINOLONE ACETONIDE 40 MG/ML
40 INJECTION, SUSPENSION INTRA-ARTICULAR; INTRAMUSCULAR ONCE
Status: COMPLETED | OUTPATIENT
Start: 2023-03-17 | End: 2023-03-17

## 2023-03-17 RX ADMIN — BUPIVACAINE HYDROCHLORIDE 25 MG: 5 INJECTION, SOLUTION PERINEURAL at 09:41

## 2023-03-17 RX ADMIN — TRIAMCINOLONE ACETONIDE 40 MG: 40 INJECTION, SUSPENSION INTRA-ARTICULAR; INTRAMUSCULAR at 09:41

## 2023-03-20 ENCOUNTER — OFFICE VISIT (OUTPATIENT)
Dept: ORTHOPEDIC SURGERY | Age: 68
End: 2023-03-20
Payer: MEDICARE

## 2023-03-20 VITALS — BODY MASS INDEX: 40.12 KG/M2 | WEIGHT: 235 LBS | HEIGHT: 64 IN

## 2023-03-20 DIAGNOSIS — Z98.1 S/P LUMBAR SPINAL FUSION: ICD-10-CM

## 2023-03-20 DIAGNOSIS — M54.2 NECK PAIN: ICD-10-CM

## 2023-03-20 DIAGNOSIS — M54.50 LUMBAR BACK PAIN: Primary | ICD-10-CM

## 2023-03-20 PROCEDURE — G8432 DEP SCR NOT DOC, RNG: HCPCS | Performed by: PHYSICIAN ASSISTANT

## 2023-03-20 PROCEDURE — G8536 NO DOC ELDER MAL SCRN: HCPCS | Performed by: PHYSICIAN ASSISTANT

## 2023-03-20 PROCEDURE — 1101F PT FALLS ASSESS-DOCD LE1/YR: CPT | Performed by: PHYSICIAN ASSISTANT

## 2023-03-20 PROCEDURE — G8399 PT W/DXA RESULTS DOCUMENT: HCPCS | Performed by: PHYSICIAN ASSISTANT

## 2023-03-20 PROCEDURE — 1123F ACP DISCUSS/DSCN MKR DOCD: CPT | Performed by: PHYSICIAN ASSISTANT

## 2023-03-20 PROCEDURE — 1090F PRES/ABSN URINE INCON ASSESS: CPT | Performed by: PHYSICIAN ASSISTANT

## 2023-03-20 PROCEDURE — G8427 DOCREV CUR MEDS BY ELIG CLIN: HCPCS | Performed by: PHYSICIAN ASSISTANT

## 2023-03-20 PROCEDURE — 3017F COLORECTAL CA SCREEN DOC REV: CPT | Performed by: PHYSICIAN ASSISTANT

## 2023-03-20 PROCEDURE — G8417 CALC BMI ABV UP PARAM F/U: HCPCS | Performed by: PHYSICIAN ASSISTANT

## 2023-03-20 PROCEDURE — 99213 OFFICE O/P EST LOW 20 MIN: CPT | Performed by: PHYSICIAN ASSISTANT

## 2023-03-20 NOTE — LETTER
3/20/2023    Patient: Reta Pickfroilan   YOB: 1955   Date of Visit: 3/20/2023     Sana Temple MD  [address] [place] [phone]  Via Fax: [phone]    Dear Sana Temple MD,      Thank you for referring Ms. Seema Mackey to [facility] for evaluation. My notes for this consultation are attached. If you have questions, please do not hesitate to call me. I look forward to following your patient along with you.       Sincerely,    RAINE Nuñez

## 2023-03-20 NOTE — PROGRESS NOTES
Seema Mackey (: 1955) is a 69 y.o. female patient here for evaluation of the following chief complaint(s):  Back Pain (S/P L4/5 Lumbar Fusion (2015))         ASSESSMENT/PLAN:  Below is the assessment and plan developed based on review of pertinent history, physical exam, labs, studies, and medications. 1. Lumbar back pain  -     XR SPINE LUMB MIN 4 V; Future  -     REFERRAL TO PHYSICAL THERAPY; Future  -     MRI LUMB SPINE WO CONT; Future  2. S/P lumbar spinal fusion  -     XR SPINE LUMB MIN 4 V; Future  -     REFERRAL TO PHYSICAL THERAPY; Future  -     MRI LUMB SPINE WO CONT; Future  3. Neck pain  -     REFERRAL TO PHYSICAL THERAPY; Future      The patient's radiologic findings have been reviewed with her in detail today. She has progressively worsening low back pain over the past 5 years. She has no significant radicular symptoms. She has tried and failed conservative management with outpatient physical therapy as well as medications. I would like for her to obtain MRI of the lumbar spine for further evaluation of compressive pathology. She may be a candidate for injection therapy. She will continue with PT at this point. She may continue with meloxicam.  We will also add physical therapy for neck pain. We will continue evaluating her neck at a later date after complete evaluation of her lumbar spine. Return for PT follow up, MRI follow up, With a PA. SUBJECTIVE/OBJECTIVE:  Judi Dinh (: 1955) is a 69 y.o. 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 She is status post prior posterior lumbar fusion at L4-5 per Dr. Sukh Drake from . She states that she began with returning gradual onset of low back pain in 2018 when she began caring for her father.   She is now currently caring for her mother as well. She has progressively worsening intermittent low back pain that is often relieved by rest.  She denies any leg pain. No numbness, tingling, or weakness in her legs. No changes in bowel or bladder control. She was seen by Dr. Shawn Arzate recently for evaluation of her hip to be sure that this was not a contributing factor. She has been participating in outpatient physical therapy recently with some limited relief. She tried a course of prednisone without relief, but does have some relief with meloxicam.    IMAGING:  XR Results (most recent):  Results from Appointment encounter on 03/20/23    XR SPINE LUMB MIN 4 V    Narrative  AP, lateral, flexion, and extension films of the lumbar spine reveal no evidence of acute fracture, lytic lesion. There is a stable posterior lumbar fusion at L4-5 with stable instrumentation. There is moderate junctional stenosis noted above the fusion particularly at L2-3 and L3-4. Minimal retrolisthesis noted at L2-3. MRI Results (most recent): Allergies   Allergen Reactions    Aspirin Palpitations    Codeine-Guaifenesin Other (comments)     CAUSES HEART PALPITATIONS AT NIGHT    Milk Prot-Turm-Pepper-Pineappl Diarrhea     CAN EAT ICE CREAM & CHEESE & BUTTER. DRINKS LACTAID    Minocycline Other (comments)     Told by MD she is allergic. She does not know the reaction. Current Outpatient Medications   Medication Sig    MULTIVITAMIN PO Take 1 Tablet by mouth daily. predniSONE (STERAPRED) 5 mg dose pack See administration instruction per 5mg dose pack (12 days)    meloxicam (MOBIC) 15 mg tablet TAKE 1 TABLET BY MOUTH EVERY DAY    amoxicillin (AMOXIL) 500 mg capsule Take 4 capsules prior to procedure. diclofenac (VOLTAREN) 1 % gel Apply  to affected area four (4) times daily. metFORMIN ER (GLUCOPHAGE XR) 500 mg tablet Take 1,000 mg by mouth daily.     cholecalciferol (VITAMIN D3) 1,000 unit tablet cholecalciferol (vitamin D3) 1,000 unit (25 mcg) tablet pravastatin (PRAVACHOL) 10 mg tablet Take 10 mg by mouth. No current facility-administered medications for this visit. Past Medical History:   Diagnosis Date    Arthritis     BOTH HIPS PAINFUL    Chronic pain     Diabetes (HCC)     PRE    Difficulty sleeping     Frequent urination     GERD (gastroesophageal reflux disease)     High cholesterol     Spinal stenosis, lumbar         Past Surgical History:   Procedure Laterality Date    HX HEENT  1980s    VOCAL CORD POLYP EXCISED    HX HIP REPLACEMENT Right 05/2012    HX ORTHOPAEDIC  1990s    FOOT SURGERY    HX ORTHOPAEDIC      back surgery    HX TONSILLECTOMY  CHILDHOOD       Family History   Problem Relation Age of Onset    Other Mother         CARDIAC ARREST DURING HIATAL HERNIA/REFLUX SURGERY;  ALSO, DOES NOT EVARISTO IV DYE    Hypertension Mother     Heart Disease Mother     Kidney Disease Mother     Anesth Problems Mother         UNSURE WHAT THE PROBLEM IS. IT WAS AFTER SURGERY IN THE MID 80'S. Hypertension Father     Dementia Father     Heart Disease Father     Elevated Lipids Father         Social History     Tobacco Use    Smoking status: Never    Smokeless tobacco: Never   Substance Use Topics    Alcohol use: Yes     Comment: RARE ALCOHOL        Review of Systems   Constitutional: Negative. Respiratory: Negative. Cardiovascular: Negative. Gastrointestinal: Negative. Endocrine: Negative. Genitourinary: Negative. Musculoskeletal:  Positive for back pain and neck pain. Skin: Negative. Allergic/Immunologic: Negative. Hematological: Negative. Psychiatric/Behavioral: Negative. All other systems reviewed and are negative. No flowsheet data found. Vitals:  Ht 5' 4\" (1.626 m)   Wt 235 lb (106.6 kg)   BMI 40.34 kg/m²    Body mass index is 40.34 kg/m². Physical Exam    Neurologic  Sensory  Light Touch - Intact - Globally.   Overall Assessment of Muscle Strength and Tone reveals  Lower Extremities - Right Iliopsoas - 5/5. Left Iliopsoas - 5/5. Right Tibialis Anterior - 5/5. Left Tibialis Anterior - 5/5. Right Gastroc-Soleus - 5/5. Left Gastroc-Soleus - 5/5. Right EHL - 5/5. Left EHL - 5/5. General Assessment of Reflexes  Right Ankle - Clonus is not present. Left Ankle - Clonus is not present. Reflexes (Dermatomes)  2/2 Normal - Left Achilles (L5-S2), Left Knee (L2-4), Right Achilles (L5-S2) and Right Knee (L2-4). Musculoskeletal  Global Assessment  Examination of related systems reveals - well-developed, well-nourished, in no acute distress, alert and oriented x 3. Gait and Station - normal gait and station and normal posture. Right Lower Extremity - normal strength and tone, normal range of motion without pain and no instability, subluxation or laxity. Left Lower Extremity - normal strength and tone, normal range of motion without pain and no instability, subluxation or laxity. Spine/Ribs/Pelvis  Cervical Spine - Examination of the cervical spine reveals - no tenderness to palpation, no pain, no swelling, edema or erythema, normal cervical spine movements and normal sensation. Thoracic (Dorsal) Spine - Examination of the thoracic spine reveals - no tenderness over thoracic vertebrae, no pain, normal sensation and normal thoracic spine movements. Lumbosacral Spine - Examination of the lumbosacral spine reveals - no known fractures or deformities. Inspection and Palpation - Tenderness - moderate. Assessment of pain reveals the following findings - The pain is characterized as - moderate. Location - pain refers to lower back bilaterally. ROJM - Trunk Extension - 15 degrees. Lumbar Spine Flexion - 35 °. Lumbosacral Spine - Functional Testing - Babinski Test negative, Prone Knee Bending Test negative, Slump Test negative, Straight Leg Raising Test negative. Dr. Vu Montilla was available for immediate consult during this encounter. An electronic signature was used to authenticate this note.   -- Fanny Jansen [place]

## 2023-03-20 NOTE — PROGRESS NOTES
1. Have you been to the ER, urgent care clinic since your last visit? Hospitalized since your last visit? No    2. Have you seen or consulted any other health care providers outside of the [address] [place] [address] since your last visit? Include any pap smears or colon screening.  No    Chief Complaint   Patient presents with    Back Pain     S/P L4/5 Lumbar Fusion (4/06/2015)

## 2023-03-21 ENCOUNTER — OFFICE VISIT (OUTPATIENT)
Dept: ORTHOPEDIC SURGERY | Age: 68
End: 2023-03-21

## 2023-03-21 DIAGNOSIS — Z98.1 S/P LUMBAR SPINAL FUSION: ICD-10-CM

## 2023-03-21 DIAGNOSIS — M54.50 LUMBAR BACK PAIN: Primary | ICD-10-CM

## 2023-03-23 ENCOUNTER — OFFICE VISIT (OUTPATIENT)
Dept: ORTHOPEDIC SURGERY | Age: 68
End: 2023-03-23

## 2023-03-23 DIAGNOSIS — M54.50 LUMBAR BACK PAIN: Primary | ICD-10-CM

## 2023-03-23 DIAGNOSIS — Z98.1 S/P LUMBAR SPINAL FUSION: ICD-10-CM

## 2023-03-23 NOTE — PROGRESS NOTES
Seema Mackey (: 1955) is a 69 y.o. Back Pain       Patient Name: Claudetta Mocha  : 1955  [x]  Patient  Verified  Payor: Cameron Darden / Plan: [place] / Product Type: Medicare /    Simón Cast,*  Total Treatment Time (min): 45  Total Timed Codes (min): 45  1:1 Treatment Time ( [address] only): 39  Visit #: 6 of 20      Treatment Area: Lumbar    ASSESSMENT/PLAN:  Below is the assessment and plan developed based on review of pertinent history, physical exam, labs, studies, and medications. Patient with less sensitivity and pain to manual therapy from baseline. Continues to have LBP, more left than right, with longer distance ambulation and standing. Benefits from physical therapy in clinic visits to decrease severity of pain. Continue to work on strength, flexibility, hypertonicity, hypomobility, and biomechanics to return to prior activities including walking 2 miles a day. 1. Lumbar back pain  2. S/P lumbar spinal fusion    Return in about 5 days (around 3/28/2023) for Continue therapy for back . SUBJECTIVE:  HPI    No new complaints. Stated that pain has decreased and she is walking again. OBJECTIVE:    Manual(mins 15)  Patient positioned in left side-lying with A/P mobilizations, lumbar gapping, and soft tissue massage to right lumbar paraspinal and piriformis. Sacral float. Function/ Strength: decreased hip flexor strength with discomfort after performing supine marches    Modalities:  Declined     Exercise(mins 30)  Today's exercises include supine bridge, supine pelvic tilt, supine marches, standing hip flexor stretch, shuttle leg press, hip flexor hang, NuStep, seated hamstring stretch,lat pull down marching, and seated flexion rolls. An electronic signature was used to authenticate this note.   -- Co-treated by SEJAL Delgado

## 2023-03-27 ENCOUNTER — OFFICE VISIT (OUTPATIENT)
Dept: ORTHOPEDIC SURGERY | Age: 68
End: 2023-03-27

## 2023-03-27 DIAGNOSIS — M54.50 LUMBAR BACK PAIN: Primary | ICD-10-CM

## 2023-03-27 DIAGNOSIS — M54.2 CERVICALGIA: ICD-10-CM

## 2023-03-27 PROCEDURE — 97140 MANUAL THERAPY 1/> REGIONS: CPT | Performed by: PHYSICAL THERAPIST

## 2023-03-27 PROCEDURE — 97110 THERAPEUTIC EXERCISES: CPT | Performed by: PHYSICAL THERAPIST

## 2023-03-27 PROCEDURE — 97161 PT EVAL LOW COMPLEX 20 MIN: CPT | Performed by: PHYSICAL THERAPIST

## 2023-03-27 NOTE — PROGRESS NOTES
Seema Mackey (: 1955) is a 69 y.o. Neck Pain and Back Pain       Patient Name: Naeem Harrison  Date:3/27/2023  : 1955  [x]  Patient  Verified  Payor: David Munguia / Plan: [place] MED[place] & [place] / Product Type: Medicare /    Roxine Pauloa,*  Total Treatment Time (min): 45  Total Timed Codes (min): 45  1:1 Treatment Time ( [address] only): 39   Visit #: 6 of 25      Treatment Area: Cervical    ASSESSMENT/PLAN:  Below is the assessment and plan developed based on review of pertinent history, physical exam, labs, studies, and medications. Patient comes in today with a diagnosis of neck pain and presents with physical therapy deficits including posture, range of motion, mobility, strength, and biomechanics. She will benefit from a physical therapy program to address above-mentioned deficits. Patient is currently being seen in our clinic for low back pain. We will treat her neck and back at the same time. Short-term goals: To become independent with today's prescribed home exercise program in the next week. Long-term goals: To progress with a physical therapy program so that patient demonstrates improved overall head and neck posture and biomechanics allowing her to tolerate activities of daily living reporting less than 3/10 neck pain in the next 4 to 6 weeks. Additionally, patient will score clinically significant improvement of 20 points on the neck disability index in the next 4 to 6 weeks. Patient will be seen twice a week for up to 20 visits  with focus on progressive restoration of range of motion and strength, balance, and functional mobility. Therapeutic applications will include but are not limited to:Manual therapy, joint mobilization, myofascial release, therapeutic exercises. Modalities including ultrasound and electric stimulation heat and ice. Kinesiotape and Girard taping for joint reeducation and approximation of tissue for neuromuscular reeducation.       1. Lumbar back pain  2. Cervicalgia    Return in about 3 days (around 3/30/2023). SUBJECTIVE:  HPI  Patient comes in today complaining of a several month history of lower neck pain. Aggravated with neck rotation and towards the end of the day. She is not taking medication for her neck. She has not been icing or heating her neck. She has avoided weight lifting at the gym because of her neck pain. She is still doing some cardio machines as well as walking once a week. She is also taking care of her elderly mother which which is stressful emotionally and to a lesser extent physically. See patient's medical chart for detail list of current medications as well as significant past medical history. OBJECTIVE:  Patient comes in today demonstrating a rounded shoulder and forward head posture. Cervical range of motion is painful into endrange rotation. Upper quarter scan reveals no deficits of strength or sensation throughout bilateral upper extremities as well as symmetrical deep tendon reflex graded 2 -/4 for bilateral bicep, brachioradialis, and elbow. Negative Pablito sign. Negative with upper limb tension testing bilaterally. Negative with Spurling's test.  Patient has a mild dowagers sign. Hypomobility throughout middle and upper thoracic spine. Patient test weak with upper cervical flexor testing against gravity. Hypertonicity to bilateral upper trapezius and cervical paraspinal.  Stability restriction to bilateral pectoralis minor and major. Neck disability index: 30%    Manual(mins 15)  Cervical traction with OA release and retraction mobilization. Soft tissue massage/myofascial release to bilateral cervical paraspinal and upper trapezius.   Upper thoracic PA mobilization with cervical spine supported in neutral.        Exercise(mins 10)  With today's interventions we initiated exercises for range of motion, strengthening, flexibility, and biomechanics for patient perform at home on a daily basis. Today's exercises include supine chin tuck, seated cervical retraction, seated thoracic extension, and corner stretch. An electronic signature was used to authenticate this note.   -- Beth Rand, PT

## 2023-03-29 ENCOUNTER — OFFICE VISIT (OUTPATIENT)
Dept: ORTHOPEDIC SURGERY | Age: 68
End: 2023-03-29
Payer: MEDICARE

## 2023-03-29 DIAGNOSIS — M54.2 CERVICALGIA: ICD-10-CM

## 2023-03-29 DIAGNOSIS — M54.50 LUMBAR BACK PAIN: Primary | ICD-10-CM

## 2023-03-29 PROCEDURE — 97140 MANUAL THERAPY 1/> REGIONS: CPT | Performed by: PHYSICAL THERAPIST

## 2023-03-29 PROCEDURE — 97110 THERAPEUTIC EXERCISES: CPT | Performed by: PHYSICAL THERAPIST

## 2023-03-29 NOTE — PROGRESS NOTES
Seema Mackey (: 1955) is a 69 y.o. Neck Pain and Back Pain       Patient Name: Dana Valero  Date:3/29/2023  : 1955  [x]  Patient  Verified  Payor: Jose Cruz / Plan: [place] MEDICARE PART A & B / Product Type: Medicare /    Aunga Corpus,*  Total Treatment Time (min): 45  Total Timed Codes (min): 45  1:1 Treatment Time ( [address] only): 39   Visit #: 6 of 25      Treatment Area: Cervical    ASSESSMENT/PLAN:  Below is the assessment and plan developed based on review of pertinent history, physical exam, labs, studies, and medications. Back and neck are responding positively to exercises and manual therapy. Continue with current plan of care and progress towards long-term goals. 1. Lumbar back pain  2. Cervicalgia    Return in about 1 week (around 2023). SUBJECTIVE:  HPI  Having a good neck and back today. OBJECTIVE:      Manual(mins 15)  Cervical traction with OA release and retraction mobilization. Soft tissue massage/myofascial release to bilateral cervical paraspinal and upper trapezius. Upper thoracic PA mobilization with cervical spine supported in neutral.  Bilateral hamstring and knee-to-chest stretch with paraspinal release. Exercise(mins 30)  Today's exercises include supine chin tuck, NuStep, modified bridge with lower trunk rotation, QL stretch, lumbar flexion rolls, seated cervical retraction, seated thoracic extension, and corner stretch. An electronic signature was used to authenticate this note.   -- Lucinda Shoulder, PT

## 2023-03-31 ENCOUNTER — OFFICE VISIT (OUTPATIENT)
Dept: ORTHOPEDIC SURGERY | Age: 68
End: 2023-03-31

## 2023-03-31 DIAGNOSIS — M54.2 CERVICALGIA: ICD-10-CM

## 2023-03-31 DIAGNOSIS — M54.50 LUMBAR BACK PAIN: Primary | ICD-10-CM

## 2023-03-31 NOTE — PROGRESS NOTES
Seema Mackey (: 1955) is a 69 y.o. Neck Pain and Back Pain       Patient Name: Arnaldo Salcedo  Date:3/31/2023  : 1955  [x]  Patient  Verified  Payor: Sahil Hannaphani / Plan: [place] MEDICARE PART A & B / Product Type: Medicare /    Logan Martinez,*  Total Treatment Time (min): 45  Total Timed Codes (min): 45  1:1 Treatment Time ( only): 39   Visit #: 8 of 25      Treatment Area: Cervical    ASSESSMENT/PLAN:  Below is the assessment and plan developed based on review of pertinent history, physical exam, labs, studies, and medications. Doing well with neck and back program.  Relative tightness to right QL. Improving with head neck posture and endurance. Continue with current plan of care and progress towards long-term goals. 1. Lumbar back pain  2. Cervicalgia    Return in about 1 week (around 2023). SUBJECTIVE:  HPI  Doing well. No new complaints. OBJECTIVE:      Manual(mins 10)  Cervical traction with OA release and retraction mobilization. Soft tissue massage/myofascial release to bilateral cervical paraspinal and upper trapezius. Upper thoracic PA mobilization with cervical spine supported in neutral.        Exercise(mins 35)  Today's exercises include supine chin tuck, NuStep, modified bridge with lower trunk rotation, QL stretch with lumbar roll, lumbar flexion rolls, seated cervical retraction, supine mid trap, seated thoracic extension, and corner stretch. An electronic signature was used to authenticate this note.   -- Mary Dangelo, PT

## 2023-04-03 ENCOUNTER — OFFICE VISIT (OUTPATIENT)
Dept: ORTHOPEDIC SURGERY | Age: 68
End: 2023-04-03

## 2023-04-03 DIAGNOSIS — M54.2 CERVICALGIA: ICD-10-CM

## 2023-04-03 DIAGNOSIS — M54.50 LUMBAR BACK PAIN: Primary | ICD-10-CM

## 2023-04-03 NOTE — PROGRESS NOTES
Seema Mackey (: 1955) is a 69 y.o. Back Pain and Neck Pain       Patient Name: Evangelina Rahman  Date:4/3/2023  : 1955  [x]  Patient  Verified  Payor: [place] / Plan: [place] MEDICARE PART A & B / Product Type: Medicare /    Merlyn Hermelinda,*  Total Treatment Time (min): 45  Total Timed Codes (min): 45  1:1 Treatment Time ( only): 39   Visit #: 10 of 25      Treatment Area: Cervical    ASSESSMENT/PLAN:  Below is the assessment and plan developed based on review of pertinent history, physical exam, labs, studies, and medications. Subjective complaints of left hip pain consistent with lumbar radiculitis. She has no pain with passive left hip internal rotation and is nontender to greater trochanteric bursa and rotator complex. Continue with current plan of care and progress towards long-term goals. 1. Lumbar back pain  2. Cervicalgia    Return in about 2 days (around 2023). SUBJECTIVE:  HPI  Patient reports experiencing sharp pain in her left hip Thday night after last PT session. She took a steroid pack and is now better. OBJECTIVE:      Manual(mins 10)  Left lower extremity long axis traction. Left hamstring and IT band stretching. Left piriformis and knee-to-chest stretch with paraspinal release. Exercise(mins 35)  Today's exercises include banded cervical retraction, NuStep, modified bridge with lower trunk rotation, QL stretch with lumbar roll, lumbar flexion rolls, posterior pelvic tilt with blood pressure cuff, supine mid trap, seated thoracic extension, and corner stretch. An electronic signature was used to authenticate this note.   -- Marianne Merritt, PT

## 2023-08-14 PROBLEM — E11.9 DIABETES MELLITUS (HCC): Status: ACTIVE | Noted: 2023-08-14

## 2023-08-14 PROBLEM — D72.829 LEUKOCYTOSIS: Status: ACTIVE | Noted: 2021-08-20

## 2023-08-14 PROBLEM — N39.41 URGE INCONTINENCE OF URINE: Status: ACTIVE | Noted: 2019-10-07

## 2023-08-14 PROBLEM — R35.1 NOCTURIA: Status: ACTIVE | Noted: 2019-10-07

## 2023-08-14 PROBLEM — R03.0 ELEVATED BLOOD-PRESSURE READING WITHOUT DIAGNOSIS OF HYPERTENSION: Status: ACTIVE | Noted: 2021-01-22

## 2023-08-14 PROBLEM — N64.4 PAIN OF BOTH BREASTS: Status: ACTIVE | Noted: 2022-12-13

## 2023-08-14 PROBLEM — B37.9 CANDIDIASIS: Status: ACTIVE | Noted: 2018-06-26

## 2023-08-14 PROBLEM — R73.03 PREDIABETES: Status: ACTIVE | Noted: 2021-02-25

## 2024-01-05 ENCOUNTER — OFFICE VISIT (OUTPATIENT)
Age: 69
End: 2024-01-05
Payer: MEDICARE

## 2024-01-05 VITALS
HEART RATE: 62 BPM | OXYGEN SATURATION: 99 % | SYSTOLIC BLOOD PRESSURE: 160 MMHG | WEIGHT: 250 LBS | BODY MASS INDEX: 42.68 KG/M2 | HEIGHT: 64 IN | DIASTOLIC BLOOD PRESSURE: 90 MMHG

## 2024-01-05 DIAGNOSIS — E66.01 OBESITY, MORBID (HCC): ICD-10-CM

## 2024-01-05 DIAGNOSIS — R03.0 ELEVATED BLOOD-PRESSURE READING WITHOUT DIAGNOSIS OF HYPERTENSION: ICD-10-CM

## 2024-01-05 DIAGNOSIS — R06.02 SOB (SHORTNESS OF BREATH): ICD-10-CM

## 2024-01-05 DIAGNOSIS — E78.5 HYPERLIPIDEMIA, UNSPECIFIED HYPERLIPIDEMIA TYPE: Primary | ICD-10-CM

## 2024-01-05 DIAGNOSIS — Z76.89 ENCOUNTER TO ESTABLISH CARE: ICD-10-CM

## 2024-01-05 DIAGNOSIS — R06.00 DYSPNEA, UNSPECIFIED TYPE: ICD-10-CM

## 2024-01-05 PROCEDURE — G8399 PT W/DXA RESULTS DOCUMENT: HCPCS | Performed by: INTERNAL MEDICINE

## 2024-01-05 PROCEDURE — 3017F COLORECTAL CA SCREEN DOC REV: CPT | Performed by: INTERNAL MEDICINE

## 2024-01-05 PROCEDURE — 1036F TOBACCO NON-USER: CPT | Performed by: INTERNAL MEDICINE

## 2024-01-05 PROCEDURE — 99204 OFFICE O/P NEW MOD 45 MIN: CPT | Performed by: INTERNAL MEDICINE

## 2024-01-05 PROCEDURE — 93010 ELECTROCARDIOGRAM REPORT: CPT | Performed by: INTERNAL MEDICINE

## 2024-01-05 PROCEDURE — G8484 FLU IMMUNIZE NO ADMIN: HCPCS | Performed by: INTERNAL MEDICINE

## 2024-01-05 PROCEDURE — 93005 ELECTROCARDIOGRAM TRACING: CPT | Performed by: INTERNAL MEDICINE

## 2024-01-05 PROCEDURE — 1090F PRES/ABSN URINE INCON ASSESS: CPT | Performed by: INTERNAL MEDICINE

## 2024-01-05 PROCEDURE — 1123F ACP DISCUSS/DSCN MKR DOCD: CPT | Performed by: INTERNAL MEDICINE

## 2024-01-05 PROCEDURE — G8427 DOCREV CUR MEDS BY ELIG CLIN: HCPCS | Performed by: INTERNAL MEDICINE

## 2024-01-05 PROCEDURE — G8417 CALC BMI ABV UP PARAM F/U: HCPCS | Performed by: INTERNAL MEDICINE

## 2024-01-05 ASSESSMENT — PATIENT HEALTH QUESTIONNAIRE - PHQ9
1. LITTLE INTEREST OR PLEASURE IN DOING THINGS: 0
SUM OF ALL RESPONSES TO PHQ QUESTIONS 1-9: 0
SUM OF ALL RESPONSES TO PHQ QUESTIONS 1-9: 0
2. FEELING DOWN, DEPRESSED OR HOPELESS: 0
SUM OF ALL RESPONSES TO PHQ QUESTIONS 1-9: 0
SUM OF ALL RESPONSES TO PHQ9 QUESTIONS 1 & 2: 0
SUM OF ALL RESPONSES TO PHQ QUESTIONS 1-9: 0

## 2024-01-05 NOTE — PROGRESS NOTES
Patient: Billie Nelson  : 1955    Primary Cardiologist: Zeina Ivey MD  EP Cardiologist: LUDIVINA  PCP: Colette Bates MD    Today's Date: 2024      ASSESSMENT AND PLAN:     Assessment and Plan:  SOB  -my thought it is related to HTN  -ambulatory BP log  -low threshold to start medication  -CCS    2. Cardiac risk assessment  CCS    3.  Obesity  -referral to weight loss clinic    4.  Prediabetes      Follow up 6 weeks with BP log.        ICD-10-CM    1. Hyperlipidemia, unspecified hyperlipidemia type  E78.5 CT CARDIAC CALCIUM SCORING      2. Dyspnea, unspecified type  R06.00 EKG 12 Lead     CT CARDIAC CALCIUM SCORING      3. Encounter to establish care  Z76.89 EKG 12 Lead     CT CARDIAC CALCIUM SCORING      4. Obesity, morbid (HCC)  E66.01 CT CARDIAC CALCIUM SCORING      5. SOB (shortness of breath)  R06.02 CT CARDIAC CALCIUM SCORING      6. Elevated blood-pressure reading without diagnosis of hypertension  R03.0           HISTORY OF PRESENT ILLNESS:     History of Present Illness:    Billie Nelson is a 68 y.o. female referred for SOB.    Both parents heart disease.  Both lived to past 100.      Dad - Humberto [employer] - [employer].      Sister  53 DM2, insulin.   of PNA.      ++ SOB, felt like a brick was on her chest.  2 months ago.      Does not sleep well. Sleep study reportedly negative - taken off of [employer].  PAR.  Doesn't trust results.      EKG today shows NSR.      BP is elevated.    Mother passed 23.  Cared for her.    Denies chest pain, edema, syncope, shortness of breath at rest, dyspnea on exertion, PND or orthopnea.  Has no tachycardia, palpitations or sense of arrhythmia.     PAST MEDICAL HISTORY:     Past Medical History:   Diagnosis Date    Arthritis     BOTH HIPS PAINFUL    Chronic pain     Diabetes (HCC)     PRE    Difficulty sleeping     Frequent urination     GERD (gastroesophageal reflux disease)     High cholesterol     Spinal

## 2024-01-05 NOTE — PROGRESS NOTES
Per Dr. Ivey- referral to weight loss clinic, CT CCS, BP log and fup before 3 months closer to 6 weeks.

## 2024-01-05 NOTE — PATIENT INSTRUCTIONS
[facility] - Ellyn Alford MD  [address] [address] [phone]

## 2024-01-09 ENCOUNTER — TELEPHONE (OUTPATIENT)
Age: 69
End: 2024-01-09

## 2024-01-11 ENCOUNTER — HOSPITAL ENCOUNTER (OUTPATIENT)
Facility: HOSPITAL | Age: 69
Discharge: HOME | End: 2024-01-11
Attending: INTERNAL MEDICINE

## 2024-01-11 ENCOUNTER — TELEPHONE (OUTPATIENT)
Age: 69
End: 2024-01-11

## 2024-01-11 DIAGNOSIS — Z76.89 ENCOUNTER TO ESTABLISH CARE: ICD-10-CM

## 2024-01-11 DIAGNOSIS — R06.00 DYSPNEA, UNSPECIFIED TYPE: ICD-10-CM

## 2024-01-11 DIAGNOSIS — E78.5 HYPERLIPIDEMIA, UNSPECIFIED HYPERLIPIDEMIA TYPE: ICD-10-CM

## 2024-01-11 DIAGNOSIS — E66.01 OBESITY, MORBID (HCC): ICD-10-CM

## 2024-01-11 DIAGNOSIS — R06.02 SOB (SHORTNESS OF BREATH): ICD-10-CM

## 2024-01-11 PROCEDURE — 75571 CT HRT W/O DYE W/CA TEST: CPT

## 2024-01-12 NOTE — RESULT ENCOUNTER NOTE
Good news!  Your coronary calcium score was ZERO, which is very low risk.    Please call with office with further questions.    Dr. Ivey

## 2024-01-12 NOTE — TELEPHONE ENCOUNTER
Received e-mail from patient regarding new patient paperwork for medical weight loss. Sent new patient paperwork via e-mail. Patient should sent completed paperwork to [email or web address] or fax [phone]. Contacted patient to advise. Patient will call back

## 2024-01-18 ENCOUNTER — OFFICE VISIT (OUTPATIENT)
Age: 69
End: 2024-01-18
Payer: MEDICARE

## 2024-01-18 VITALS
TEMPERATURE: 98.3 F | BODY MASS INDEX: 42.1 KG/M2 | OXYGEN SATURATION: 95 % | HEIGHT: 64 IN | DIASTOLIC BLOOD PRESSURE: 84 MMHG | RESPIRATION RATE: 18 BRPM | WEIGHT: 246.6 LBS | HEART RATE: 62 BPM | SYSTOLIC BLOOD PRESSURE: 144 MMHG

## 2024-01-18 DIAGNOSIS — Z13.29 SCREENING FOR HYPOTHYROIDISM: ICD-10-CM

## 2024-01-18 DIAGNOSIS — E11.9 TYPE 2 DIABETES MELLITUS WITHOUT COMPLICATION, WITHOUT LONG-TERM CURRENT USE OF INSULIN (HCC): ICD-10-CM

## 2024-01-18 DIAGNOSIS — E66.01 CLASS 3 SEVERE OBESITY DUE TO EXCESS CALORIES WITH SERIOUS COMORBIDITY AND BODY MASS INDEX (BMI) OF 40.0 TO 44.9 IN ADULT (HCC): Primary | ICD-10-CM

## 2024-01-18 DIAGNOSIS — M17.0 PRIMARY OSTEOARTHRITIS OF KNEES, BILATERAL: ICD-10-CM

## 2024-01-18 DIAGNOSIS — E66.01 CLASS 3 SEVERE OBESITY DUE TO EXCESS CALORIES WITH SERIOUS COMORBIDITY AND BODY MASS INDEX (BMI) OF 40.0 TO 44.9 IN ADULT (HCC): ICD-10-CM

## 2024-01-18 DIAGNOSIS — R03.0 ELEVATED BLOOD-PRESSURE READING WITHOUT DIAGNOSIS OF HYPERTENSION: ICD-10-CM

## 2024-01-18 DIAGNOSIS — R06.83 SNORES: ICD-10-CM

## 2024-01-18 DIAGNOSIS — E78.2 MIXED HYPERLIPIDEMIA: ICD-10-CM

## 2024-01-18 DIAGNOSIS — R40.0 DAYTIME SOMNOLENCE: ICD-10-CM

## 2024-01-18 LAB — HBA1C MFR BLD: 5.9 % (ref 4.8–5.6)

## 2024-01-18 PROCEDURE — G8399 PT W/DXA RESULTS DOCUMENT: HCPCS | Performed by: FAMILY MEDICINE

## 2024-01-18 PROCEDURE — 1036F TOBACCO NON-USER: CPT | Performed by: FAMILY MEDICINE

## 2024-01-18 PROCEDURE — G8427 DOCREV CUR MEDS BY ELIG CLIN: HCPCS | Performed by: FAMILY MEDICINE

## 2024-01-18 PROCEDURE — 2022F DILAT RTA XM EVC RTNOPTHY: CPT | Performed by: FAMILY MEDICINE

## 2024-01-18 PROCEDURE — 3044F HG A1C LEVEL LT 7.0%: CPT | Performed by: FAMILY MEDICINE

## 2024-01-18 PROCEDURE — G8417 CALC BMI ABV UP PARAM F/U: HCPCS | Performed by: FAMILY MEDICINE

## 2024-01-18 PROCEDURE — 1123F ACP DISCUSS/DSCN MKR DOCD: CPT | Performed by: FAMILY MEDICINE

## 2024-01-18 PROCEDURE — 99204 OFFICE O/P NEW MOD 45 MIN: CPT | Performed by: FAMILY MEDICINE

## 2024-01-18 PROCEDURE — G8484 FLU IMMUNIZE NO ADMIN: HCPCS | Performed by: FAMILY MEDICINE

## 2024-01-18 PROCEDURE — 1090F PRES/ABSN URINE INCON ASSESS: CPT | Performed by: FAMILY MEDICINE

## 2024-01-18 PROCEDURE — 3017F COLORECTAL CA SCREEN DOC REV: CPT | Performed by: FAMILY MEDICINE

## 2024-01-18 RX ORDER — VALACYCLOVIR HYDROCHLORIDE 500 MG/1
1 TABLET, FILM COATED ORAL DAILY
COMMUNITY
Start: 2023-12-01

## 2024-01-18 RX ORDER — TOPIRAMATE 25 MG/1
25 TABLET ORAL
Qty: 60 TABLET | Refills: 2 | Status: SHIPPED | OUTPATIENT
Start: 2024-01-18

## 2024-01-18 ASSESSMENT — PATIENT HEALTH QUESTIONNAIRE - PHQ9
SUM OF ALL RESPONSES TO PHQ QUESTIONS 1-9: 0
SUM OF ALL RESPONSES TO PHQ9 QUESTIONS 1 & 2: 0
2. FEELING DOWN, DEPRESSED OR HOPELESS: 0
SUM OF ALL RESPONSES TO PHQ QUESTIONS 1-9: 0
1. LITTLE INTEREST OR PLEASURE IN DOING THINGS: 0

## 2024-01-18 NOTE — PROGRESS NOTES
[facility] Progress Note: Initial Physician Visit     Billie Nelson is a 68 y.o. female who is here for medical screening for entering the [facility].   The patient denies any disease state that requires protein restriction.    CC: class 3 Obesity    Referred by Dr Uche gonzalez to get healthier    Weight History  I personally reviewed the Medical Screening [facility] completed by patient and scanned into media section of chart.  It includes duration of their obesity, maximum weight, goal weight  all of which give the context of their obesity AND a Family History of their obesity.    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  It includes number of weight loss attempts, the weight loss program that patients was most successful using, and if they have any hx of anorectic medication use, including OTC supplements.     WW   Medi weight loss: 25 lb and plateaued          Significant Medical History  Past Medical History:   Diagnosis Date    Arthritis     BOTH HIPS PAINFUL    Chronic pain     Diabetes (HCC)     PRE    Difficulty sleeping     Frequent urination     GERD (gastroesophageal reflux disease)     High cholesterol     Spinal stenosis, lumbar          Patient's medicactions that may contribute  none  Plan to become pregant within 6 months NA    I personally reviewed the Medical Screening [facility] completed by the patient and scanned into media section of chart.  This allows me to assess associated symptoms that are significant in the assessment of the patient's obesity and the patient's Past Medical History.    Current Outpatient Medications   Medication Sig Dispense Refill    valACYclovir (VALTREX)

## 2024-01-18 NOTE — PROGRESS NOTES
Identified pt with two pt identifiers (name and ). Reviewed chart in preparation for visit and have obtained necessary documentation.    Billie Nelson is a 68 y.o. female  Chief Complaint   Patient presents with    New Patient    Weight Management     BP (!) 144/84 (Site: Right Upper Arm, Position: Sitting, Cuff Size: Large Adult) Comment: manual  Pulse 62   Temp 98.3 °F (36.8 °C) (Oral)   Resp 18   Ht 1.626 m (5' 4\")   Wt 111.9 kg (246 lb 9.6 oz)   SpO2 95%   BMI 42.33 kg/m²     1. Have you been to the ER, urgent care clinic since your last visit?  Hospitalized since your last visit?no    2. Have you seen or consulted any other health care providers outside of the [facility] since your last visit?  Include any pap smears or colon screening. No    BMI - 42.2    Patient and provider made aware of elevated BP x2. Patient asymptomatic. Patient reminded to monitor BP, continue to take BP medications if prescribed, and follow up with PCP/Cardiologist.  Patient expressed understanding and agreement.

## 2024-01-19 LAB
ALBUMIN SERPL-MCNC: 4.6 G/DL (ref 3.9–4.9)
ALBUMIN/GLOB SERPL: 1.4 {RATIO} (ref 1.2–2.2)
ALP SERPL-CCNC: 113 IU/L (ref 44–121)
ALT SERPL-CCNC: 21 IU/L (ref 0–32)
AST SERPL-CCNC: 21 IU/L (ref 0–40)
BILIRUB SERPL-MCNC: 0.5 MG/DL (ref 0–1.2)
BUN SERPL-MCNC: 21 MG/DL (ref 8–27)
BUN/CREAT SERPL: 23 (ref 12–28)
CALCIUM SERPL-MCNC: 10.5 MG/DL (ref 8.7–10.3)
CHLORIDE SERPL-SCNC: 100 MMOL/L (ref 96–106)
CHOLEST SERPL-MCNC: 198 MG/DL (ref 100–199)
CO2 SERPL-SCNC: 25 MMOL/L (ref 20–29)
CREAT SERPL-MCNC: 0.91 MG/DL (ref 0.57–1)
EGFRCR SERPLBLD CKD-EPI 2021: 68 ML/MIN/1.73
GLOBULIN SER CALC-MCNC: 3.2 G/DL (ref 1.5–4.5)
GLUCOSE SERPL-MCNC: 98 MG/DL (ref 70–99)
HDLC SERPL-MCNC: 85 MG/DL
LDLC SERPL CALC-MCNC: 101 MG/DL (ref 0–99)
POTASSIUM SERPL-SCNC: 4.4 MMOL/L (ref 3.5–5.2)
PROT SERPL-MCNC: 7.8 G/DL (ref 6–8.5)
SODIUM SERPL-SCNC: 141 MMOL/L (ref 134–144)
T4 FREE SERPL-MCNC: 1.36 NG/DL (ref 0.82–1.77)
TRIGL SERPL-MCNC: 67 MG/DL (ref 0–149)
TSH SERPL DL<=0.005 MIU/L-ACNC: 1.93 UIU/ML (ref 0.45–4.5)
VLDLC SERPL CALC-MCNC: 12 MG/DL (ref 5–40)

## 2024-01-23 ENCOUNTER — OFFICE VISIT (OUTPATIENT)
Age: 69
End: 2024-01-23

## 2024-01-23 DIAGNOSIS — E66.01 CLASS 3 SEVERE OBESITY DUE TO EXCESS CALORIES WITH SERIOUS COMORBIDITY AND BODY MASS INDEX (BMI) OF 40.0 TO 44.9 IN ADULT (HCC): Primary | ICD-10-CM

## 2024-01-23 ASSESSMENT — SLEEP AND FATIGUE QUESTIONNAIRES
FOSQ SCORE: 16.5
HOW LIKELY ARE YOU TO NOD OFF OR FALL ASLEEP WHILE LYING DOWN TO REST IN THE AFTERNOON WHEN CIRCUMSTANCES PERMIT: 1
HOW LIKELY ARE YOU TO NOD OFF OR FALL ASLEEP IN A CAR, WHILE STOPPED FOR A FEW MINUTES IN TRAFFIC: SLIGHT CHANCE OF DOZING
DO YOU WORK SHIFTS: NO
DO YOU HAVE DIFFICULTY WATCHING A MOVIE OR VIDEO BECAUSE YOU BECOME SLEEPY OR TIRED: YES, MODERATE
DO YOU HAVE DIFFICULTY VISITING YOUR FAMILY OR FRIENDS IN THEIR HOME BECAUSE YOU BECOME SLEEPY OR TIRED: NO
HOW LIKELY ARE YOU TO NOD OFF OR FALL ASLEEP WHILE SITTING INACTIVE IN A PUBLIC PLACE: MODERATE CHANCE OF DOZING
AVERAGE NUMBER OF SLEEP HOURS: 3
HOW LIKELY ARE YOU TO NOD OFF OR FALL ASLEEP WHEN YOU ARE A PASSENGER IN A CAR FOR AN HOUR WITHOUT A BREAK: 0
HOW LIKELY ARE YOU TO NOD OFF OR FALL ASLEEP WHILE WATCHING TV: 2
HOW LIKELY ARE YOU TO NOD OFF OR FALL ASLEEP WHILE SITTING AND TALKING TO SOMEONE: WOULD NEVER DOZE
DO YOU GET TOO LITTLE SLEEP AT NIGHT: DOES
AVERAGE NUMBER OF SLEEP HOURS: 3
HAS YOUR RELATIONSHIP WITH FAMILY, FRIENDS OR WORK COLLEAGUES BEEN AFFECTED BECAUSE YOU ARE SLEEPY OR TIRED: NO
HOW LIKELY ARE YOU TO NOD OFF OR FALL ASLEEP WHILE SITTING AND READING: 1
HOW LONG DO YOU NAP: 15 TO 30 MINUTES
DO YOU HAVE DIFFICULTY CONCENTRATING ON THE THINGS YOU DO BECAUSE YOU ARE SLEEPY OR TIRED: YES, MODERATE
DO YOU HAVE DIFFICULTY BEING AS ACTIVE AS YOU WANT TO BE IN THE EVENING BECAUSE YOU ARE SLEEPY OR TIRED: YES, LITTLE
DO YOU GENERALLY HAVE DIFFICULTY REMEMBERING THINGS BECAUSE YOU ARE SLEEPY OR TIRED: YES, A LITTLE
HOW LIKELY ARE YOU TO NOD OFF OR FALL ASLEEP WHILE SITTING AND TALKING TO SOMEONE: 0
ARE YOU BOTHERED BY WAKING UP TOO EARLY AND NOT BEING ABLE TO GET BACK TO SLEEP: IS NOT
ARE YOU BOTHERED BY WAKING UP TOO EARLY AND NOT BEING ABLE TO GET BACK TO SLEEP: NO
HOW LIKELY ARE YOU TO NOD OFF OR FALL ASLEEP WHILE SITTING AND READING: SLIGHT CHANCE OF DOZING
HOW MANY NAPS DO YOU TAKE PER WEEK: 4
HOW LIKELY ARE YOU TO NOD OFF OR FALL ASLEEP WHILE LYING DOWN TO REST IN THE AFTERNOON WHEN CIRCUMSTANCES PERMIT: SLIGHT CHANCE OF DOZING
HOW LIKELY ARE YOU TO NOD OFF OR FALL ASLEEP WHILE SITTING QUIETLY AFTER LUNCH WITHOUT ALCOHOL: 1
HOW LIKELY ARE YOU TO NOD OFF OR FALL ASLEEP WHILE SITTING INACTIVE IN A PUBLIC PLACE: 2
HOW LIKELY ARE YOU TO NOD OFF OR FALL ASLEEP IN A CAR, WHILE STOPPED FOR A FEW MINUTES IN TRAFFIC: 1
DO YOU HAVE DIFFICULTY OPERATING A MOTOR VEHICLE FOR LONG DISTANCES (GREATER THAN 100 MILES) BECAUSE YOU BECOME SLEEPY: NO
ESS TOTAL SCORE: 8
DO YOU HAVE DIFFICULTY OPERATING A MOTOR VEHICLE FOR SHORT DISTANCES (LESS THAN 100 MILES) BECAUSE YOU BECOME SLEEPY: NO
NUMBER OF TIMES YOU WAKE PER NIGHT: 4
HOW LIKELY ARE YOU TO NOD OFF OR FALL ASLEEP WHILE WATCHING TV: MODERATE CHANCE OF DOZING
DO YOU HAVE DIFFICULTY BEING AS ACTIVE AS YOU WANT TO BE IN THE MORNING BECAUSE YOU ARE SLEEPY OR TIRED: YES, LITTLE
HAS YOUR MOOD BEEN AFFECTED BECAUSE YOU ARE SLEEPY OR TIRED: NO
SELECT ANY OF THE FOLLOWING BEHAVIORS OBSERVED WHILE YOU ARE ASLEEP: LOUD SNORING
HOW LIKELY ARE YOU TO NOD OFF OR FALL ASLEEP WHEN YOU ARE A PASSENGER IN A CAR FOR AN HOUR WITHOUT A BREAK: WOULD NEVER DOZE
DO YOU HAVE PROBLEMS WITH FREQUENT AWAKENINGS AT NIGHT: YES
HOW LIKELY ARE YOU TO NOD OFF OR FALL ASLEEP WHILE SITTING QUIETLY AFTER LUNCH WITHOUT ALCOHOL: SLIGHT CHANCE OF DOZING
SELECT ANY OF THE FOLLOWING BEHAVIORS OBSERVED WHILE PATIENT ASLEEP: LOUD SNORING
DO YOU GET TOO LITTLE SLEEP AT NIGHT: YES
DO YOU TAKE NAPS: YES

## 2024-01-23 NOTE — PROGRESS NOTES
[facility]  Metabolic Weight Loss Program        Patient's Name: Billie Nelson  : 1955    This patient is a participant at [facility] and attended the weekly virtual nutrition class hosted via [employer].      Tiffany Fox, MS, RD, LDN

## 2024-01-25 ENCOUNTER — TELEMEDICINE (OUTPATIENT)
Age: 69
End: 2024-01-25
Payer: MEDICARE

## 2024-01-25 DIAGNOSIS — G47.33 OBSTRUCTIVE SLEEP APNEA (ADULT) (PEDIATRIC): Primary | ICD-10-CM

## 2024-01-25 DIAGNOSIS — E66.01 SEVERE OBESITY (BMI >= 40) (HCC): ICD-10-CM

## 2024-01-25 DIAGNOSIS — R73.03 PRE-DIABETES: ICD-10-CM

## 2024-01-25 PROCEDURE — 1090F PRES/ABSN URINE INCON ASSESS: CPT | Performed by: INTERNAL MEDICINE

## 2024-01-25 PROCEDURE — 99204 OFFICE O/P NEW MOD 45 MIN: CPT | Performed by: INTERNAL MEDICINE

## 2024-01-25 PROCEDURE — G8427 DOCREV CUR MEDS BY ELIG CLIN: HCPCS | Performed by: INTERNAL MEDICINE

## 2024-01-25 PROCEDURE — 3017F COLORECTAL CA SCREEN DOC REV: CPT | Performed by: INTERNAL MEDICINE

## 2024-01-25 PROCEDURE — 1123F ACP DISCUSS/DSCN MKR DOCD: CPT | Performed by: INTERNAL MEDICINE

## 2024-01-25 PROCEDURE — G8399 PT W/DXA RESULTS DOCUMENT: HCPCS | Performed by: INTERNAL MEDICINE

## 2024-01-25 NOTE — PATIENT INSTRUCTIONS
label. Alcohol naturally makes you sleepy and on its own can cause accidents. Combined with excessive drowsiness its effects are amplified.   Signs of Drowsy Driving:   * You don't remember driving the last few miles   * You may drift out of your lavonne   * You are unable to focus and your thoughts wander   * You may yawn more often than normal   * You have difficulty keeping your eyes open / nodding off   * Missing traffic signs, speeding, or tailgating  Prevention-   Good sleep hygiene, lifestyle and behavioral choices have the most impact on drowsy driving. There is no substitute for sleep and the average person requires 8 hours nightly. If you find yourself driving drowsy, stop and sleep. Consider the sleep hygiene tips provided during your visit as well.     Medication Refill Policy: Refills for all medications require 1 week advance notice. Please have your pharmacy fax a refill request. We are unable to fax, or call in \"controled substance\" medications and you will need to pick these prescriptions up from our office.

## 2024-01-25 NOTE — PROGRESS NOTES
Billie Nelson is a 68 y.o. female who was seen by synchronous (real-time) audio-video technology on 1/25/2024.      Consent:  She and/or her healthcare decision maker is aware that this patient-initiated Telehealth encounter is a billable service, with coverage as determined by her insurance carrier. She is aware that she may receive a bill and has provided verbal consent to proceed: Yes       The patient was located at Home: [address] [address].  The provider was located at Home ([place].                           [address] [place] [place] [address]  Tel.  [phone]  Fax. [phone] [address]  [place] [address]  Tel.  [phone]  Fax. [phone] [address] [place] [address].  [place] [address]  Tel.  [phone]  Fax. [phone]        Patient called and identity confirmed with 2 patient identifers     Billie Nelson is a 68 y.o. female who was seen by synchronous (real-time) audio-video technology on 1/25/2024.           --Marion Vega MD on 1/25/2024 at 3:44 PM                                 [address] [place] [place] [address]  Tel.  [phone]  Fax. [phone] [address]  [place] [address]  Tel.  [phone]  Fax. [phone] [address] [place] [address].  [place] [address]  Tel.  [phone]  Fax. [phone]         Subjective:             Billie Nelson is an 68 y.o. female referred for evaluation for a sleep disorder.       She complains of snoring associated with difficulty falling and staying asleedp.  Symptoms began several years ago, unchanged since that time. She usually can fall asleep in hours, sometimes she thinks she does not fall asleep. She does doze off in the evenings in a recliner.  Family or house members note snoring. She denies falling asleep while driving.  .  Billie Nelson does wake up frequently at night. She is not bothered by waking up too early and left unable to get

## 2024-01-29 ENCOUNTER — HOSPITAL ENCOUNTER (OUTPATIENT)
Facility: HOSPITAL | Age: 69
Discharge: HOME | End: 2024-02-01
Payer: MEDICARE

## 2024-01-29 ENCOUNTER — NURSE ONLY (OUTPATIENT)
Age: 69
End: 2024-01-29

## 2024-01-29 ENCOUNTER — TELEPHONE (OUTPATIENT)
Facility: HOSPITAL | Age: 69
End: 2024-01-29

## 2024-01-29 VITALS
DIASTOLIC BLOOD PRESSURE: 81 MMHG | HEIGHT: 64 IN | OXYGEN SATURATION: 94 % | SYSTOLIC BLOOD PRESSURE: 153 MMHG | HEART RATE: 70 BPM | BODY MASS INDEX: 42 KG/M2 | TEMPERATURE: 97.2 F | WEIGHT: 246 LBS

## 2024-01-29 VITALS
SYSTOLIC BLOOD PRESSURE: 134 MMHG | WEIGHT: 239.5 LBS | HEIGHT: 64 IN | OXYGEN SATURATION: 96 % | BODY MASS INDEX: 40.89 KG/M2 | HEART RATE: 59 BPM | RESPIRATION RATE: 18 BRPM | DIASTOLIC BLOOD PRESSURE: 84 MMHG

## 2024-01-29 DIAGNOSIS — E11.9 TYPE 2 DIABETES MELLITUS WITHOUT COMPLICATION, WITHOUT LONG-TERM CURRENT USE OF INSULIN (HCC): ICD-10-CM

## 2024-01-29 DIAGNOSIS — R06.83 SNORES: ICD-10-CM

## 2024-01-29 DIAGNOSIS — E66.01 CLASS 3 SEVERE OBESITY DUE TO EXCESS CALORIES WITH SERIOUS COMORBIDITY AND BODY MASS INDEX (BMI) OF 40.0 TO 44.9 IN ADULT (HCC): Primary | ICD-10-CM

## 2024-01-29 DIAGNOSIS — G47.33 OBSTRUCTIVE SLEEP APNEA (ADULT) (PEDIATRIC): ICD-10-CM

## 2024-01-29 PROCEDURE — 95810 POLYSOM 6/> YRS 4/> PARAM: CPT | Performed by: INTERNAL MEDICINE

## 2024-01-29 ASSESSMENT — PATIENT HEALTH QUESTIONNAIRE - PHQ9
1. LITTLE INTEREST OR PLEASURE IN DOING THINGS: 0
2. FEELING DOWN, DEPRESSED OR HOPELESS: 0
SUM OF ALL RESPONSES TO PHQ9 QUESTIONS 1 & 2: 0
SUM OF ALL RESPONSES TO PHQ QUESTIONS 1-9: 0

## 2024-01-29 NOTE — PROGRESS NOTES
Progress Note: Weekly Education Class in the [facility]         Patient is on Very Low Calorie Diet [x] (4 meal replacements per day, 800 kcal/day)      Low Calorie Diet [] (2-3 meal replacements per day, 7910-5170 kcal/day)    1) Did patient have any new symptoms or physical problems?   Yes [x]    No []    If yes, check & comment: weakness [], fatigue [], lightheadedness [], headache [], cramps [], cold intolerance [], hair loss [], diarrhea [], constipation [],  NA [] other: mild stomach pain                                2) Has patient had any medical attention from other providers, urgent care or the emergency room this week?  Yes []  No [x]       NA [], If yes, why:                                       3) Any other sugar sweetened beverages consumed this week?   Yes []  No [x]    4) Did patient have any problems adhering to the diet? Yes [x]  No [] NA []    If yes, Vacation [], Celebrations [], Conferences [], Family Reunions [] other: vacation is 2/26/2024-3/2/2024                                               5) How many hours of sleep this week?     (range)  NA []NOT ANSWERED      Number of meal replacements consumed daily? 3-4 (range)  NA []    Average ounces of water patient consumed daily this week (not including shakes)? 65     (divide the weekly total by 7)    Did you eat any food outside of the program? Yes [] No [x]    Physical Activity Over the Past Week:    Cardio exercise: 0 min  Strength exercise: 0 workouts / week  Number of steps walked per day: 0    How has patient mood overall been this week? Sad [], Happy [], Stressed [], Tired [], Content [], NA [], other Several different moods             Medications reconciled by nurse Yes [x]  No[]    Patient was given therapeutic recommendations for any noted side effects of their dietary approach based upon [facility] patient manual per providers recommendation.

## 2024-01-29 NOTE — PROGRESS NOTES
Identified pt with two pt identifiers (name and ). Reviewed chart in preparation for visit and have obtained necessary documentation.    Billie Nelson is a 68 y.o. female  Chief Complaint   Patient presents with    Weight Management     /84 (Site: Right Upper Arm, Position: Sitting, Cuff Size: Large Adult)   Pulse 59   Resp 18   Ht 1.626 m (5' 4\")   Wt 108.6 kg (239 lb 8 oz)   SpO2 96%   BMI 41.11 kg/m²     1. Have you been to the ER, urgent care clinic since your last visit?  Hospitalized since your last visit?no    2. Have you seen or consulted any other health care providers outside of the [facility] since your last visit?  Include any pap smears or colon screening. No    Patient and provider made aware of elevated BP x2. Patient asymptomatic. Patient reminded to monitor BP, continue to take BP medications if prescribed, and follow up with PCP/Cardiologist.  Patient expressed understanding and agreement.

## 2024-01-30 NOTE — PROGRESS NOTES
[address]  [place] [address]  Tel.  [phone]  Fax. [phone] [address]  [place] [address]  Tel.  [phone]  Fax. [phone] [address] [place] [address]  Tel.  [phone]  Fax. [phone]     Sleep Study Technical Notes    Disclaimer:  all notes have not been confirmed by interpreting physician      PRE-Test:  Billie Nelson (: 1955) arrived on time. Vitals taken: temperature (97.2)  BP (153/81) SpO2 (94%) HR (70). She was shown directly to the room. A heater was placed in advance to warm the air in the bedroom.  Paperwork completed. She is here for a PSG study.  Procedure explained, questions answered and she expressed understanding. An extra blanket was provided. The patient says she doesn't feel like she sleeps at night. She gets up early in the mornings.  The patient has an adjustable bed at home.  She is in the room 3 tonight and the bed was adjusted for her comfort. Electrodes were applied without incident. Once settled in bed, the study was started.    Acquisition Notes:  Lights off: 9:20pm  Sleep onset: 9:51pm  Respiratory events: O,C  Snoring:  Mild  Other comments:   Sleep Latency=31 minutes  Mild snoring  Solo respiratory event upon entry into REM  Extended wake segment  Sleep was resumed  Patient awoke on her own and was ready to end the study  Necessary recording time requirements were met  Lights On: 3:52am    POST Test:  Patient awoke on her own. Electrodes were removed. Post Sleep Questionnaire completed.   Patient stated they felt okay to drive. Equipment and room cleaned per infection control policy.    Patient post sleep comments:  How long did you sleep: 4 1/2-5 1/2 hours  How long did it take you to fall asleep: 20 minutes  How many times did you wake up: 3-4 times  Difficulty returning to sleep: Yes - unknown  How did you sleep compared to usual: better   Did you have any problems sleeping in the lab: no,

## 2024-01-31 ENCOUNTER — OFFICE VISIT (OUTPATIENT)
Age: 69
End: 2024-01-31

## 2024-01-31 DIAGNOSIS — E66.01 CLASS 3 SEVERE OBESITY DUE TO EXCESS CALORIES WITH SERIOUS COMORBIDITY AND BODY MASS INDEX (BMI) OF 40.0 TO 44.9 IN ADULT (HCC): Primary | ICD-10-CM

## 2024-01-31 NOTE — PROGRESS NOTES
[facility]  Metabolic Program Initial Nutrition Consult    Date: 2024   Physician: Ellyn Alford MD  Name: Billie Nelson  :  1955    Type of Plan: VLCD  Weeks on Plan: 2 weeks  Virtual visit was completed through Zoom.    ASSESSMENT:    Medications/Supplements:   Prior to Admission medications    Medication Sig Start Date End Date Taking? Authorizing Provider   valACYclovir (VALTREX) 500 MG tablet Take 1 tablet by mouth daily 23   Obdulio Alarcon MD   topiramate (TOPAMAX) 25 MG tablet Take 1 tablet by mouth Daily with supper  Patient not taking: Reported on 2024   Ellyn Alford MD   amoxicillin (AMOXIL) 500 MG capsule TAKE 4 CAPSULES PRIOR TO PROCEDURE. 10/9/23   Bruce Mccall MD   ZINC PO Take 25 mg by mouth daily    ProviderObdulio MD   pravastatin (PRAVACHOL) 20 MG tablet Take 1 tablet by mouth daily 23   Obdulio Alarcon MD   meloxicam (MOBIC) 15 MG tablet Take 1 tablet by mouth daily  Patient taking differently: Take 1 tablet by mouth as needed 23   Sylvia Agustin PA-C   Multiple Vitamin (MULTIVITAMIN PO) Take 1 tablet by mouth daily    Automatic Reconciliation, Ar   diclofenac sodium (VOLTAREN) 1 % GEL Apply topically as needed    Automatic Reconciliation, Ar   metFORMIN (GLUCOPHAGE-XR) 500 MG extended release tablet Take 2 tablets by mouth daily 3/20/21   Automatic Reconciliation, Ar       Anthropometrics:    Ht:64\"   Wt: 246#    IBW: 120#    %IBW: 205%     BMI:42    Category: Obesity Class 3  Today's weight is 239#    Pt presents today for initial nutrition consult for the [facility] [place] - Metabolic Program.  Normal weight in college, played tennis, active.  Needs help with weight loss now.  Heaviest she has been.    Exercise/Physical Activity:not reported    Started meal replacements:yes  If yes, how many per day:2    Aversions/side effects to meal replacements:none    Reported Diet

## 2024-02-01 ENCOUNTER — OFFICE VISIT (OUTPATIENT)
Age: 69
End: 2024-02-01

## 2024-02-01 DIAGNOSIS — E66.01 CLASS 3 SEVERE OBESITY DUE TO EXCESS CALORIES WITH SERIOUS COMORBIDITY AND BODY MASS INDEX (BMI) OF 40.0 TO 44.9 IN ADULT (HCC): Primary | ICD-10-CM

## 2024-02-02 NOTE — PROGRESS NOTES
[facility]  Metabolic Weight Loss Program        Patient's Name: Billie Nelson  : 1955    This patient is a participant at [facility] and attended the weekly virtual nutrition class hosted via [employer].      Tiffany Fox, MS, RD, LDN

## 2024-02-05 ENCOUNTER — NURSE ONLY (OUTPATIENT)
Age: 69
End: 2024-02-05

## 2024-02-05 VITALS
HEART RATE: 58 BPM | RESPIRATION RATE: 14 BRPM | TEMPERATURE: 98.1 F | HEIGHT: 64 IN | WEIGHT: 237.9 LBS | DIASTOLIC BLOOD PRESSURE: 84 MMHG | SYSTOLIC BLOOD PRESSURE: 144 MMHG | BODY MASS INDEX: 40.62 KG/M2 | OXYGEN SATURATION: 98 %

## 2024-02-05 DIAGNOSIS — E66.01 CLASS 3 SEVERE OBESITY DUE TO EXCESS CALORIES WITH SERIOUS COMORBIDITY AND BODY MASS INDEX (BMI) OF 40.0 TO 44.9 IN ADULT (HCC): Primary | ICD-10-CM

## 2024-02-05 DIAGNOSIS — E11.9 TYPE 2 DIABETES MELLITUS WITHOUT COMPLICATION, WITHOUT LONG-TERM CURRENT USE OF INSULIN (HCC): ICD-10-CM

## 2024-02-05 NOTE — PROGRESS NOTES
Identified patient with two patient identifiers (name and ). Reviewed chart in preparation for visit and have obtained necessary documentation.    Billie Nelson is a 68 y.o. female  Chief Complaint   Patient presents with    Weight Management     BP (!) 144/84 (Site: Right Upper Arm, Position: Sitting, Cuff Size: Large Adult)   Pulse 58   Temp 98.1 °F (36.7 °C) (Oral)   Resp 14   Ht 1.626 m (5' 4\")   Wt 107.9 kg (237 lb 14.4 oz)   SpO2 98%   BMI 40.84 kg/m²     1. Have you been to the ER, urgent care clinic since your last visit?  Hospitalized since your last visit?no    2. Have you seen or consulted any other health care providers outside of the [facility] since your last visit?  Include any pap smears or colon screening. no

## 2024-02-05 NOTE — PROGRESS NOTES
Progress Note: Weekly Education Class in the [facility] Weight Loss Program         Patient is on Very Low Calorie Diet [x] (4 meal replacements per day, 800 kcal/day)      Low Calorie Diet [] (2-3 meal replacements per day, 8636-2358 kcal/day)    1) Did patient have any new symptoms or physical problems?   Yes []    No [x]    If yes, check & comment: weakness [], fatigue [], lightheadedness [], headache [], cramps [], cold intolerance [], hair loss [], diarrhea [], constipation [],  NA [] other:                                 2) Has patient had any medical attention from other providers, urgent care or the emergency room this week?  Yes []  No [x]       NA [], If yes, why:                                       3) Any other sugar sweetened beverages consumed this week?   Yes []  No [x]    4) Did patient have any problems adhering to the diet? Yes [x]  No [] NA []    If yes, Vacation [], Celebrations [x], Conferences [], Family Reunions [] other: per patient attended an out of town basketball game. Stayed over night at a hotel.                                                5) How many hours of sleep this week?     (range)  NA []    Number of meal replacements consumed daily? 2-4 (range)  NA []    Average ounces of water patient consumed daily this week (not including shakes)? 63.4 oz     (divide the weekly total by 7)    Did you eat any food outside of the program? Yes [x] No []    Physical Activity Over the Past Week:    Cardio exercise:  min  Strength exercise:  workouts / week  Number of steps walked per day: 2,506    How has patient mood overall been this week? Sad [], Happy [], Stressed [], Tired [], Content [], NA [], other ok, good           Medications reconciled by nurse Yes [x]  No[]    Patient was given therapeutic recommendations for any noted side effects of their dietary approach based upon [facility] patient manual per providers recommendation.

## 2024-02-06 ENCOUNTER — OFFICE VISIT (OUTPATIENT)
Age: 69
End: 2024-02-06

## 2024-02-06 DIAGNOSIS — E66.01 CLASS 3 SEVERE OBESITY DUE TO EXCESS CALORIES WITH SERIOUS COMORBIDITY AND BODY MASS INDEX (BMI) OF 40.0 TO 44.9 IN ADULT (HCC): Primary | ICD-10-CM

## 2024-02-06 NOTE — PROGRESS NOTES
[facility]  Metabolic Weight Loss Program        Patient's Name: Billie Nelson  : 1955    This patient is a participant at [facility] and attended the weekly virtual nutrition class hosted via [employer]      Tiffany Fox, MS, RD, LDN

## 2024-02-07 NOTE — TELEPHONE ENCOUNTER
Results of sleep study in [employer]  Lead tech to convey results to patient      PSG was negative for significant apnea. AHI was 1/hour, lowest oxygen saturation was 91%.   Therefore, PAP therapy is not indicated at this time. A positive test shows an apnea index/AHI of >5/hour.     Mild snoring was noted.   she fell asleep in 31 minutes. . Sleep efficiency was 62%.All stages of sleep were seen including deep and REM sleep.           she should ensure  a regular sleep wake cycle. she  should avoid looking at the clock during the night.  Ideally, the clock face should be turned away.  she should not look at her phone during the night. she should minimize caffeine use and to avoid caffeine-containing beverages 8 hours prior to bedtime. She should avoid daytime napping.     A regular exercise schedule, at least 3 hours before bedtime, would be beneficial to improving sleep quality.    Watching TV, using laptops, tablets and smartphones in the evening is discouraged.  she  should  keep the bedroom cool and dark.      Repeat testing is indicated if symptoms worsen.

## 2024-02-08 ENCOUNTER — CLINICAL DOCUMENTATION (OUTPATIENT)
Age: 69
End: 2024-02-08

## 2024-02-12 ENCOUNTER — NURSE ONLY (OUTPATIENT)
Age: 69
End: 2024-02-12

## 2024-02-12 ENCOUNTER — TELEPHONE (OUTPATIENT)
Age: 69
End: 2024-02-12

## 2024-02-12 VITALS
WEIGHT: 235.3 LBS | OXYGEN SATURATION: 98 % | DIASTOLIC BLOOD PRESSURE: 78 MMHG | HEART RATE: 61 BPM | SYSTOLIC BLOOD PRESSURE: 136 MMHG | HEIGHT: 64 IN | TEMPERATURE: 98.3 F | BODY MASS INDEX: 40.17 KG/M2 | RESPIRATION RATE: 18 BRPM

## 2024-02-12 DIAGNOSIS — E66.01 SEVERE OBESITY (HCC): Primary | ICD-10-CM

## 2024-02-12 ASSESSMENT — PATIENT HEALTH QUESTIONNAIRE - PHQ9
SUM OF ALL RESPONSES TO PHQ QUESTIONS 1-9: 0
2. FEELING DOWN, DEPRESSED OR HOPELESS: 0
SUM OF ALL RESPONSES TO PHQ QUESTIONS 1-9: 0
1. LITTLE INTEREST OR PLEASURE IN DOING THINGS: 0
SUM OF ALL RESPONSES TO PHQ QUESTIONS 1-9: 0

## 2024-02-12 NOTE — PROGRESS NOTES
Progress Note: Weekly Education Class in the [facility] Weight Loss Program         Patient is on Very Low Calorie Diet [x] (4 meal replacements per day, 800 kcal/day)      Low Calorie Diet [] (2-3 meal replacements per day, 7993-0160 kcal/day)    1) Did patient have any new symptoms or physical problems?   Yes [x]    No []    If yes, check & comment: weakness [], fatigue [], lightheadedness [], headache [], cramps [], cold intolerance [], hair loss [], diarrhea [], constipation [x],  NA [] other:                                 2) Has patient had any medical attention from other providers, urgent care or the emergency room this week?  Yes []  No [x]       NA [], If yes, why:                                       3) Any other sugar sweetened beverages consumed this week?   Yes []  No [x]    4) Did patient have any problems adhering to the diet? Yes []  No [] NA []    If yes, Vacation [], Celebrations [], Conferences [], Family Reunions [] other: home maintenanca issues/meeting with [facility] personnel, plumbers, etc                                               5) How many hours of sleep this week? 3.25-4.75    (range)  NA []    Number of meal replacements consumed daily? 3 (range)  NA []    Average ounces of water patient consumed daily this week (not including shakes)? 61     (divide the weekly total by 7)    Did you eat any food outside of the program? Yes [x] No []    Physical Activity Over the Past Week:    Cardio exercise: 15 min  Strength exercise: 1 workouts / week  Number of steps walked per day: 6034-4041    How has patient mood overall been this week? Sad [], Happy [], Stressed [], Tired [], Content [], NA [], other Several different moods             Medications reconciled by nurse Yes [x]  No[]    Patient was given therapeutic recommendations for any noted side effects of their dietary approach based upon [facility] patient manual per providers recommendation.

## 2024-02-12 NOTE — TELEPHONE ENCOUNTER
Returned patient's call. She is on VLCD medical weight loss program with Dr. Alford.  Her Mandaen is starting a fast for Leanne Feb 14 - March 31st, fasting 6pm-6am and having one meal a day.  Advised patient she cannot have just one [employer] meal replacement a day as a substitute, as this is too low calories and macros for a whole day.  She would get more nutrition from the one meal a day as recommended by her Mandaen.  Provided a couple of options  1) speak with  about medical program she is on to see if she can continue it.  2) pause VLCD program and complete the fast, then return to VLCD program in April.  Also recommended she reach out to Dr. Alford for additional guidance.

## 2024-02-12 NOTE — PROGRESS NOTES
Identified pt with two pt identifiers (name and ). Reviewed chart in preparation for visit and have obtained necessary documentation.    Billie Nelson is a 68 y.o. female  Chief Complaint   Patient presents with    Weight Management     /78 (Site: Right Upper Arm, Position: Sitting, Cuff Size: Large Adult)   Pulse 61   Temp 98.3 °F (36.8 °C) (Oral)   Resp 18   Ht 1.626 m (5' 4\")   SpO2 98%   BMI 40.84 kg/m²     1. Have you been to the ER, urgent care clinic since your last visit?  Hospitalized since your last visit?no    2. Have you seen or consulted any other health care providers outside of the [facility] since your last visit?  Include any pap smears or colon screening. No    Patient and provider made aware of elevated BP x2. Patient asymptomatic. Patient reminded to monitor BP, continue to take BP medications if prescribed, and follow up with PCP/Cardiologist.  Patient expressed understanding and agreement.       Pt lives in an apartment alone, 0 steps to enter, first floor set up. Pt performed ADL/IADLs with assist of HHA 8-10hrs/5days. Ambulates with rollator. Owns DME: rollator, cane. Pt lives in an apartment alone, 0 steps to enter, first floor set up. Pt performed ADL/IADLs with assist of HHA 8-10hrs/5days. Ambulates with rollator. Owns DME: rollator, cane. Pt is [place] has hearing aides

## 2024-02-13 ENCOUNTER — OFFICE VISIT (OUTPATIENT)
Age: 69
End: 2024-02-13

## 2024-02-13 DIAGNOSIS — E66.01 CLASS 3 SEVERE OBESITY DUE TO EXCESS CALORIES WITH SERIOUS COMORBIDITY AND BODY MASS INDEX (BMI) OF 40.0 TO 44.9 IN ADULT (HCC): Primary | ICD-10-CM

## 2024-02-16 NOTE — PROGRESS NOTES
[facility]  Metabolic Weight Loss Program        Patient's Name: Billie Nelson  : 1955    This patient is a participant at [facility] and attended the weekly virtual nutrition class hosted via [employer].      Tiffany Fox, MS, RD, LDN

## 2024-02-20 ENCOUNTER — OFFICE VISIT (OUTPATIENT)
Age: 69
End: 2024-02-20
Payer: MEDICARE

## 2024-02-20 VITALS
OXYGEN SATURATION: 98 % | HEIGHT: 64 IN | HEART RATE: 55 BPM | RESPIRATION RATE: 16 BRPM | BODY MASS INDEX: 39.86 KG/M2 | TEMPERATURE: 98.3 F | DIASTOLIC BLOOD PRESSURE: 80 MMHG | WEIGHT: 233.5 LBS | SYSTOLIC BLOOD PRESSURE: 140 MMHG

## 2024-02-20 DIAGNOSIS — E66.01 CLASS 3 SEVERE OBESITY DUE TO EXCESS CALORIES WITH SERIOUS COMORBIDITY AND BODY MASS INDEX (BMI) OF 40.0 TO 44.9 IN ADULT (HCC): ICD-10-CM

## 2024-02-20 DIAGNOSIS — E11.9 TYPE 2 DIABETES MELLITUS WITHOUT COMPLICATION, WITHOUT LONG-TERM CURRENT USE OF INSULIN (HCC): ICD-10-CM

## 2024-02-20 DIAGNOSIS — E66.01 CLASS 3 SEVERE OBESITY DUE TO EXCESS CALORIES WITH SERIOUS COMORBIDITY AND BODY MASS INDEX (BMI) OF 40.0 TO 44.9 IN ADULT (HCC): Primary | ICD-10-CM

## 2024-02-20 DIAGNOSIS — G47.09 OTHER INSOMNIA: ICD-10-CM

## 2024-02-20 DIAGNOSIS — E78.2 MIXED HYPERLIPIDEMIA: ICD-10-CM

## 2024-02-20 PROCEDURE — G8399 PT W/DXA RESULTS DOCUMENT: HCPCS | Performed by: FAMILY MEDICINE

## 2024-02-20 PROCEDURE — 1123F ACP DISCUSS/DSCN MKR DOCD: CPT | Performed by: FAMILY MEDICINE

## 2024-02-20 PROCEDURE — 3017F COLORECTAL CA SCREEN DOC REV: CPT | Performed by: FAMILY MEDICINE

## 2024-02-20 PROCEDURE — G8427 DOCREV CUR MEDS BY ELIG CLIN: HCPCS | Performed by: FAMILY MEDICINE

## 2024-02-20 PROCEDURE — G8484 FLU IMMUNIZE NO ADMIN: HCPCS | Performed by: FAMILY MEDICINE

## 2024-02-20 PROCEDURE — 2022F DILAT RTA XM EVC RTNOPTHY: CPT | Performed by: FAMILY MEDICINE

## 2024-02-20 PROCEDURE — G8417 CALC BMI ABV UP PARAM F/U: HCPCS | Performed by: FAMILY MEDICINE

## 2024-02-20 PROCEDURE — 1036F TOBACCO NON-USER: CPT | Performed by: FAMILY MEDICINE

## 2024-02-20 PROCEDURE — 1090F PRES/ABSN URINE INCON ASSESS: CPT | Performed by: FAMILY MEDICINE

## 2024-02-20 PROCEDURE — 3044F HG A1C LEVEL LT 7.0%: CPT | Performed by: FAMILY MEDICINE

## 2024-02-20 PROCEDURE — 99214 OFFICE O/P EST MOD 30 MIN: CPT | Performed by: FAMILY MEDICINE

## 2024-02-20 RX ORDER — HYDROXYZINE HYDROCHLORIDE 25 MG/1
25 TABLET, FILM COATED ORAL NIGHTLY
Qty: 30 TABLET | Refills: 0 | Status: SHIPPED | OUTPATIENT
Start: 2024-02-20 | End: 2024-03-21

## 2024-02-20 ASSESSMENT — PATIENT HEALTH QUESTIONNAIRE - PHQ9
SUM OF ALL RESPONSES TO PHQ QUESTIONS 1-9: 0
SUM OF ALL RESPONSES TO PHQ QUESTIONS 1-9: 0
1. LITTLE INTEREST OR PLEASURE IN DOING THINGS: 0
2. FEELING DOWN, DEPRESSED OR HOPELESS: 0
SUM OF ALL RESPONSES TO PHQ QUESTIONS 1-9: 0
SUM OF ALL RESPONSES TO PHQ QUESTIONS 1-9: 0
SUM OF ALL RESPONSES TO PHQ9 QUESTIONS 1 & 2: 0

## 2024-02-20 NOTE — PROGRESS NOTES
Identified pt with two pt identifiers (name and ). Reviewed chart in preparation for visit and have obtained necessary documentation.    Billie Nelson is a 68 y.o. female  Chief Complaint   Patient presents with    Weight Management     1 month follow up     BP (!) 140/80 (Site: Right Upper Arm, Position: Sitting, Cuff Size: Large Adult) Comment: manual  Pulse 55   Temp 98.3 °F (36.8 °C) (Oral)   Resp 16   Ht 1.626 m (5' 4\")   Wt 105.9 kg (233 lb 8 oz)   SpO2 98%   BMI 40.08 kg/m²     1. Have you been to the ER, urgent care clinic since your last visit?  Hospitalized since your last visit?no    2. Have you seen or consulted any other health care providers outside of the [facility] since your last visit?  Include any pap smears or colon screening. No    BMI - 40.0    Patient and provider made aware of elevated BP x2. Patient asymptomatic. Patient reminded to monitor BP, continue to take BP medications if prescribed, and follow up with PCP/Cardiologist.  Patient expressed understanding and agreement.

## 2024-02-20 NOTE — PROGRESS NOTES
[facility] Loss Program Progress Note:   F/up Physician Visit    CC: Weight Management      Billie Nelson is a 68 y.o. female who is here for her  f/up physician visit for the VLCD  Program.  [address]  Now 233    She is going to [facility] next week and plans to pause            2/23/2024    10:36 AM 2/22/2024    11:09 AM 2/20/2024     8:00 AM 2/20/2024     7:59 AM 2/12/2024     8:38 AM 2/5/2024     8:17 AM 1/29/2024    10:30 PM   Weight Metrics   Weight 233 lb 234 lb 12.8 oz  233 lb 8 oz 235 lb 4.8 oz 237 lb 14.4 oz 246 lb   Neck (Inches)   14.5 in       Waist Measure Inches   46.25 in       Body Fat %   46.9 %       BMI (Calculated) 40.1 kg/m2 40.4 kg/m2  40.2 kg/m2 40.5 kg/m2 40.9 kg/m2 42.3 kg/m2          No data to display                   Current Outpatient Medications   Medication Sig Dispense Refill    hydrOXYzine HCl (ATARAX) 25 MG tablet Take 1 tablet by mouth nightly 30 tablet 0    valACYclovir (VALTREX) 500 MG tablet Take 1 tablet by mouth daily      topiramate (TOPAMAX) 25 MG tablet Take 1 tablet by mouth Daily with supper 60 tablet 2    amoxicillin (AMOXIL) 500 MG capsule TAKE 4 CAPSULES PRIOR TO PROCEDURE. (Patient not taking: Reported on 2/23/2024) 4 capsule 1    ZINC PO Take 25 mg by mouth daily      pravastatin (PRAVACHOL) 20 MG tablet Take 1 tablet by mouth daily      meloxicam (MOBIC) 15 MG tablet Take 1 tablet by mouth daily (Patient taking differently: Take 1 tablet by mouth as needed) 30 tablet 3    Multiple Vitamin (MULTIVITAMIN PO) Take 1 tablet by mouth daily      diclofenac sodium (VOLTAREN) 1 % GEL Apply topically as needed      metFORMIN (GLUCOPHAGE-XR) 500 MG extended release tablet Take 2 tablets by mouth daily      predniSONE 5 MG (48) TBPK See administration instruction per 5mg dose pack (12 days) 1 each 0     No current facility-administered medications for this visit.          Participation   Did you attend clinic and class last week? no    Review of Systems  Since your last

## 2024-02-21 LAB
ALBUMIN SERPL-MCNC: 4.5 G/DL (ref 3.9–4.9)
ALBUMIN/GLOB SERPL: 1.6 {RATIO} (ref 1.2–2.2)
ALP SERPL-CCNC: 103 IU/L (ref 44–121)
ALT SERPL-CCNC: 23 IU/L (ref 0–32)
AST SERPL-CCNC: 27 IU/L (ref 0–40)
BILIRUB SERPL-MCNC: 0.4 MG/DL (ref 0–1.2)
BUN SERPL-MCNC: 20 MG/DL (ref 8–27)
BUN/CREAT SERPL: 23 (ref 12–28)
CALCIUM SERPL-MCNC: 10.4 MG/DL (ref 8.7–10.3)
CHLORIDE SERPL-SCNC: 105 MMOL/L (ref 96–106)
CO2 SERPL-SCNC: 24 MMOL/L (ref 20–29)
CREAT SERPL-MCNC: 0.88 MG/DL (ref 0.57–1)
EGFRCR SERPLBLD CKD-EPI 2021: 71 ML/MIN/1.73
GLOBULIN SER CALC-MCNC: 2.8 G/DL (ref 1.5–4.5)
GLUCOSE SERPL-MCNC: 94 MG/DL (ref 70–99)
POTASSIUM SERPL-SCNC: 4.3 MMOL/L (ref 3.5–5.2)
PROT SERPL-MCNC: 7.3 G/DL (ref 6–8.5)
SODIUM SERPL-SCNC: 142 MMOL/L (ref 134–144)

## 2024-02-22 ENCOUNTER — OFFICE VISIT (OUTPATIENT)
Age: 69
End: 2024-02-22
Payer: MEDICARE

## 2024-02-22 VITALS
WEIGHT: 234.8 LBS | BODY MASS INDEX: 40.08 KG/M2 | HEIGHT: 64 IN | SYSTOLIC BLOOD PRESSURE: 136 MMHG | OXYGEN SATURATION: 100 % | HEART RATE: 51 BPM | DIASTOLIC BLOOD PRESSURE: 74 MMHG

## 2024-02-22 DIAGNOSIS — R03.0 ELEVATED BLOOD-PRESSURE READING WITHOUT DIAGNOSIS OF HYPERTENSION: ICD-10-CM

## 2024-02-22 DIAGNOSIS — R73.03 PREDIABETES: ICD-10-CM

## 2024-02-22 DIAGNOSIS — R06.00 DYSPNEA, UNSPECIFIED TYPE: Primary | ICD-10-CM

## 2024-02-22 DIAGNOSIS — E66.9 OBESITY, UNSPECIFIED CLASSIFICATION, UNSPECIFIED OBESITY TYPE, UNSPECIFIED WHETHER SERIOUS COMORBIDITY PRESENT: ICD-10-CM

## 2024-02-22 DIAGNOSIS — E78.5 HYPERLIPIDEMIA, UNSPECIFIED HYPERLIPIDEMIA TYPE: ICD-10-CM

## 2024-02-22 PROCEDURE — 99214 OFFICE O/P EST MOD 30 MIN: CPT | Performed by: INTERNAL MEDICINE

## 2024-02-22 PROCEDURE — 1090F PRES/ABSN URINE INCON ASSESS: CPT | Performed by: INTERNAL MEDICINE

## 2024-02-22 PROCEDURE — G8399 PT W/DXA RESULTS DOCUMENT: HCPCS | Performed by: INTERNAL MEDICINE

## 2024-02-22 PROCEDURE — G8484 FLU IMMUNIZE NO ADMIN: HCPCS | Performed by: INTERNAL MEDICINE

## 2024-02-22 PROCEDURE — G8427 DOCREV CUR MEDS BY ELIG CLIN: HCPCS | Performed by: INTERNAL MEDICINE

## 2024-02-22 PROCEDURE — 1123F ACP DISCUSS/DSCN MKR DOCD: CPT | Performed by: INTERNAL MEDICINE

## 2024-02-22 PROCEDURE — 3017F COLORECTAL CA SCREEN DOC REV: CPT | Performed by: INTERNAL MEDICINE

## 2024-02-22 PROCEDURE — G8417 CALC BMI ABV UP PARAM F/U: HCPCS | Performed by: INTERNAL MEDICINE

## 2024-02-22 PROCEDURE — 1036F TOBACCO NON-USER: CPT | Performed by: INTERNAL MEDICINE

## 2024-02-22 NOTE — PROGRESS NOTES
Patient: Billie Nelson  : 1955    Primary Cardiologist: Zeina Ivey MD  EP Cardiologist: LUDIVINA  PCP: Colette Bates MD    Today's Date: 2024      ASSESSMENT AND PLAN:     Assessment and Plan:  SOB  -my thought it is related to HTN  -ambulatory BP log  -low threshold to start medication  -CCS ZERO    2. Cardiac risk assessment  CCS ZERO    3.  Obesity  -referral to weight loss clinic  -seeing Dr. Alford    4.  Prediabetes  Pravastatin  metformin      Follow up as needed.        ICD-10-CM    1. Dyspnea, unspecified type  R06.00       2. Hyperlipidemia, unspecified hyperlipidemia type  E78.5       3. Elevated blood-pressure reading without diagnosis of hypertension  R03.0       4. Obesity, unspecified classification, unspecified obesity type, unspecified whether serious comorbidity present  E66.9       5. Prediabetes  R73.03           HISTORY OF PRESENT ILLNESS:     History of Present Illness:    Billie Nelson is a 68 y.o. female referred for SOB.    Both parents heart disease.  Both lived to past 100.      Dad - [employer] - [place] [employer].      Sister  53 DM2, insulin.   of PNA.      ++ SOB, felt like a brick was on her chest.  2 months ago.      Does not sleep well. Sleep study reportedly negative - taken off of [employer].  PAR.  Doesn't trust results.      EKG today shows NSR.      BP is elevated.    Mother passed 23.  Cared for her.    Denies chest pain, edema, syncope, shortness of breath at rest, dyspnea on exertion, PND or orthopnea.  Has no tachycardia, palpitations or sense of arrhythmia.     PAST MEDICAL HISTORY:     Past Medical History:   Diagnosis Date    Arthritis     BOTH HIPS PAINFUL    Chronic pain     Diabetes (HCC)     PRE    Difficulty sleeping     Frequent urination     GERD (gastroesophageal reflux disease)     High cholesterol     Spinal stenosis, lumbar        Past Surgical History:   Procedure Laterality Date    HEENT

## 2024-03-04 ENCOUNTER — NURSE ONLY (OUTPATIENT)
Age: 69
End: 2024-03-04

## 2024-03-04 ENCOUNTER — OFFICE VISIT (OUTPATIENT)
Age: 69
End: 2024-03-04

## 2024-03-04 VITALS
OXYGEN SATURATION: 98 % | DIASTOLIC BLOOD PRESSURE: 74 MMHG | WEIGHT: 230.7 LBS | HEIGHT: 64 IN | SYSTOLIC BLOOD PRESSURE: 116 MMHG | TEMPERATURE: 98.5 F | BODY MASS INDEX: 39.38 KG/M2 | RESPIRATION RATE: 16 BRPM | HEART RATE: 63 BPM

## 2024-03-04 DIAGNOSIS — R40.0 DAYTIME SOMNOLENCE: ICD-10-CM

## 2024-03-04 DIAGNOSIS — E66.01 CLASS 2 SEVERE OBESITY DUE TO EXCESS CALORIES WITH SERIOUS COMORBIDITY AND BODY MASS INDEX (BMI) OF 39.0 TO 39.9 IN ADULT (HCC): Primary | ICD-10-CM

## 2024-03-04 DIAGNOSIS — E78.2 MIXED HYPERLIPIDEMIA: ICD-10-CM

## 2024-03-04 DIAGNOSIS — E66.01 CLASS 3 SEVERE OBESITY DUE TO EXCESS CALORIES WITH SERIOUS COMORBIDITY AND BODY MASS INDEX (BMI) OF 40.0 TO 44.9 IN ADULT (HCC): Primary | ICD-10-CM

## 2024-03-04 DIAGNOSIS — E11.9 TYPE 2 DIABETES MELLITUS WITHOUT COMPLICATION, WITHOUT LONG-TERM CURRENT USE OF INSULIN (HCC): ICD-10-CM

## 2024-03-04 ASSESSMENT — PATIENT HEALTH QUESTIONNAIRE - PHQ9
SUM OF ALL RESPONSES TO PHQ QUESTIONS 1-9: 0
SUM OF ALL RESPONSES TO PHQ9 QUESTIONS 1 & 2: 0
1. LITTLE INTEREST OR PLEASURE IN DOING THINGS: 0
2. FEELING DOWN, DEPRESSED OR HOPELESS: 0

## 2024-03-04 NOTE — PROGRESS NOTES
[facility]  Metabolic Program Follow-up Nutrition Consult    Date: 3/4/2024   Physician: Ellyn Alford MD  Name: Billie Nelson  :  1955    Type of Plan: VLCD   Weeks on Plan: 1 month  Virtual visit was completed through Zoom.    ASSESSMENT:    Medications/Supplements:   Prior to Admission medications    Medication Sig Start Date End Date Taking? Authorizing Provider   predniSONE 5 MG (48) TBPK See administration instruction per 5mg dose pack (12 days) 24   Sylvia Agustin PA-C   hydrOXYzine HCl (ATARAX) 25 MG tablet Take 1 tablet by mouth nightly 2/20/24 3/21/24  Ellyn Alford MD   valACYclovir (VALTREX) 500 MG tablet Take 1 tablet by mouth daily 23   Obdulio Alarcon MD   topiramate (TOPAMAX) 25 MG tablet Take 1 tablet by mouth Daily with supper 24   Ellyn Alford MD   amoxicillin (AMOXIL) 500 MG capsule TAKE 4 CAPSULES PRIOR TO PROCEDURE.  Patient not taking: Reported on 2024 10/9/23   Bruce Mccall MD   ZINC PO Take 25 mg by mouth daily    ProviderObdulio MD   pravastatin (PRAVACHOL) 20 MG tablet Take 1 tablet by mouth daily 23   ProviderObudlio MD   meloxicam (MOBIC) 15 MG tablet Take 1 tablet by mouth daily  Patient taking differently: Take 1 tablet by mouth as needed 23   Sylvia Agustin PA-C   Multiple Vitamin (MULTIVITAMIN PO) Take 1 tablet by mouth daily    Automatic Reconciliation, Ar   diclofenac sodium (VOLTAREN) 1 % GEL Apply topically as needed    Automatic Reconciliation, Ar   metFORMIN (GLUCOPHAGE-XR) 500 MG extended release tablet Take 2 tablets by mouth daily 3/20/21   Automatic Reconciliation, Ar              Starting Weight: 246#  Current Weight: 233#  Overall Pounds Lost: 13# Overall Pounds Gained: 0  225# self report weight today, goal is to lose 47#    Exercise/Physical Activity:increased walking, using cane less.    Is patient using [employer] products:yes  If yes, how many per

## 2024-03-04 NOTE — PROGRESS NOTES
Identified pt with two pt identifiers (name and ). Reviewed chart in preparation for visit and have obtained necessary documentation.    Billie Nelson is a 68 y.o. female  Chief Complaint   Patient presents with    Weight Management     /74 (Site: Right Upper Arm, Position: Sitting, Cuff Size: Large Adult)   Pulse 63   Temp 98.5 °F (36.9 °C) (Oral)   Resp 16   Ht 1.626 m (5' 4\")   Wt 104.6 kg (230 lb 11.2 oz)   SpO2 98%   BMI 39.60 kg/m²     1. Have you been to the ER, urgent care clinic since your last visit?  Hospitalized since your last visit?no    2. Have you seen or consulted any other health care providers outside of the [facility] since your last visit?  Include any pap smears or colon screening. [facility] after fall    Patient attended triage but did not bring homework form. Patient instructed to email or fax completed homework form to us. Patient informed that not bringing the homework form can result in not being seen next time.

## 2024-03-11 ENCOUNTER — NURSE ONLY (OUTPATIENT)
Age: 69
End: 2024-03-11

## 2024-03-11 VITALS
WEIGHT: 228.8 LBS | SYSTOLIC BLOOD PRESSURE: 137 MMHG | HEIGHT: 64 IN | DIASTOLIC BLOOD PRESSURE: 83 MMHG | RESPIRATION RATE: 18 BRPM | HEART RATE: 63 BPM | OXYGEN SATURATION: 98 % | BODY MASS INDEX: 39.06 KG/M2 | TEMPERATURE: 98.2 F

## 2024-03-11 DIAGNOSIS — E78.2 MIXED HYPERLIPIDEMIA: ICD-10-CM

## 2024-03-11 DIAGNOSIS — E11.9 TYPE 2 DIABETES MELLITUS WITHOUT COMPLICATION, WITHOUT LONG-TERM CURRENT USE OF INSULIN (HCC): ICD-10-CM

## 2024-03-11 DIAGNOSIS — E66.01 CLASS 2 SEVERE OBESITY DUE TO EXCESS CALORIES WITH SERIOUS COMORBIDITY AND BODY MASS INDEX (BMI) OF 39.0 TO 39.9 IN ADULT (HCC): Primary | ICD-10-CM

## 2024-03-11 RX ORDER — ASCORBIC ACID 500 MG
500 TABLET ORAL AS NEEDED
COMMUNITY

## 2024-03-11 ASSESSMENT — PATIENT HEALTH QUESTIONNAIRE - PHQ9
SUM OF ALL RESPONSES TO PHQ QUESTIONS 1-9: 0
SUM OF ALL RESPONSES TO PHQ9 QUESTIONS 1 & 2: 0
SUM OF ALL RESPONSES TO PHQ QUESTIONS 1-9: 0
SUM OF ALL RESPONSES TO PHQ QUESTIONS 1-9: 0
1. LITTLE INTEREST OR PLEASURE IN DOING THINGS: NOT AT ALL
SUM OF ALL RESPONSES TO PHQ QUESTIONS 1-9: 0
2. FEELING DOWN, DEPRESSED OR HOPELESS: NOT AT ALL

## 2024-03-11 NOTE — PROGRESS NOTES
Identified pt with two pt identifiers (name and ). Reviewed chart in preparation for visit and have obtained necessary documentation.    Billie Nelson is a 68 y.o. female  Chief Complaint   Patient presents with    Weight Management     /83 (Site: Right Upper Arm, Position: Sitting, Cuff Size: Large Adult)   Pulse 63   Temp 98.2 °F (36.8 °C) (Oral)   Resp 18   Ht 1.626 m (5' 4\")   Wt 103.8 kg (228 lb 12.8 oz)   SpO2 98%   BMI 39.27 kg/m²     1. Have you been to the ER, urgent care clinic since your last visit?  Hospitalized since your last visit?no    2. Have you seen or consulted any other health care providers outside of the [facility] since your last visit?  Include any pap smears or colon screening. no

## 2024-03-11 NOTE — PROGRESS NOTES
2/26/2024    Progress Note: Weekly Education Class in the [facility] Weight Loss Program         Patient is on Very Low Calorie Diet [x] (4 meal replacements per day, 800 kcal/day)      Low Calorie Diet [] (2-3 meal replacements per day, 9913-4276 kcal/day)    1) Did patient have any new symptoms or physical problems?   Yes [x]    No []    If yes, check & comment: weakness [], fatigue [], lightheadedness [], headache [], cramps [], cold intolerance [], hair loss [], diarrhea [], constipation [],  NA [] other: knee pain from fall last week                                2) Has patient had any medical attention from other providers, urgent care or the emergency room this week?  Yes []  No [x]       NA [], If yes, why:                                       3) Any other sugar sweetened beverages consumed this week?   Yes []  No [x]    4) Did patient have any problems adhering to the diet? Yes [x]  No [] NA []    If yes, Vacation [x], Celebrations [], Conferences [], Family Reunions [] other: [employer] in [place]                                               5) How many hours of sleep this week? 4.5    (range)  NA []    Number of meal replacements consumed daily? 1-3 (range)  NA []    Average ounces of water patient consumed daily this week (not including shakes)? 47     (divide the weekly total by 7)    Did you eat any food outside of the program? Yes [x] No []    Physical Activity Over the Past Week:    Cardio exercise: 0 min  Strength exercise: 0 workouts / week  Number of steps walked per day: 7675-1137    How has patient mood overall been this week? Sad [], Happy [], Stressed [], Tired [], Content [], NA [], other Several different moods             Medications reconciled by nurse Yes [x]  No[]    Patient was given therapeutic recommendations for any noted side effects of their dietary approach based upon [facility] patient manual per providers recommendation.     3/4/2024    Progress Note: Weekly

## 2024-03-13 NOTE — PROGRESS NOTES
Nurse note from patient's weekly VLCD / Maintenance class was reviewed.  Pertinent medical concerns were:   reviewed   No homework  BP Readings from Last 3 Encounters:   03/11/24 137/83   03/04/24 116/74   02/22/24 136/74       Failed to redirect to the Timeline version of the [facility] SmartLink.    Current Outpatient Medications   Medication Sig Dispense Refill    hydrOXYzine HCl (ATARAX) 25 MG tablet Take 1 tablet by mouth nightly 30 tablet 0    valACYclovir (VALTREX) 500 MG tablet Take 1 tablet by mouth daily      topiramate (TOPAMAX) 25 MG tablet Take 1 tablet by mouth Daily with supper 60 tablet 2    ZINC PO Take 25 mg by mouth daily      pravastatin (PRAVACHOL) 20 MG tablet Take 1 tablet by mouth daily      meloxicam (MOBIC) 15 MG tablet Take 1 tablet by mouth daily (Patient taking differently: Take 1 tablet by mouth as needed) 30 tablet 3    Multiple Vitamin (MULTIVITAMIN PO) Take 1 tablet by mouth daily      diclofenac sodium (VOLTAREN) 1 % GEL Apply topically as needed      metFORMIN (GLUCOPHAGE-XR) 500 MG extended release tablet Take 2 tablets by mouth daily      Dextromethorphan Polistirex (COUGH DM PO) Take 5 mLs by mouth as needed      DM-Doxylamine-Acetaminophen (NYQUIL COLD & FLU PO) Take 5 mLs by mouth as needed      dextromethorphan-guaiFENesin (MUCINEX DM)  MG per extended release tablet Take 1 tablet by mouth every 12 hours as needed      vitamin C (ASCORBIC ACID) 500 MG tablet Take 1 tablet by mouth as needed      amoxicillin (AMOXIL) 500 MG capsule TAKE 4 CAPSULES PRIOR TO PROCEDURE. 4 capsule 1     No current facility-administered medications for this visit.

## 2024-03-14 ENCOUNTER — OFFICE VISIT (OUTPATIENT)
Age: 69
End: 2024-03-14

## 2024-03-14 DIAGNOSIS — E66.01 CLASS 2 SEVERE OBESITY DUE TO EXCESS CALORIES WITH SERIOUS COMORBIDITY AND BODY MASS INDEX (BMI) OF 39.0 TO 39.9 IN ADULT (HCC): Primary | ICD-10-CM

## 2024-03-18 ENCOUNTER — TELEPHONE (OUTPATIENT)
Age: 69
End: 2024-03-18

## 2024-03-18 NOTE — TELEPHONE ENCOUNTER
Contacted patient's secondary insurance Health system by phone; Policy is active with an effective date of 10/1/2018.

## 2024-03-19 ENCOUNTER — OFFICE VISIT (OUTPATIENT)
Age: 69
End: 2024-03-19
Payer: MEDICARE

## 2024-03-19 VITALS
SYSTOLIC BLOOD PRESSURE: 130 MMHG | TEMPERATURE: 98 F | HEIGHT: 64 IN | RESPIRATION RATE: 20 BRPM | WEIGHT: 225.2 LBS | DIASTOLIC BLOOD PRESSURE: 76 MMHG | BODY MASS INDEX: 38.45 KG/M2 | OXYGEN SATURATION: 98 % | HEART RATE: 55 BPM

## 2024-03-19 DIAGNOSIS — E11.9 TYPE 2 DIABETES MELLITUS WITHOUT COMPLICATION, WITHOUT LONG-TERM CURRENT USE OF INSULIN (HCC): ICD-10-CM

## 2024-03-19 DIAGNOSIS — E78.2 MIXED HYPERLIPIDEMIA: ICD-10-CM

## 2024-03-19 DIAGNOSIS — E66.01 CLASS 2 SEVERE OBESITY DUE TO EXCESS CALORIES WITH SERIOUS COMORBIDITY AND BODY MASS INDEX (BMI) OF 39.0 TO 39.9 IN ADULT (HCC): Primary | ICD-10-CM

## 2024-03-19 DIAGNOSIS — G47.09 OTHER INSOMNIA: ICD-10-CM

## 2024-03-19 PROCEDURE — G8399 PT W/DXA RESULTS DOCUMENT: HCPCS | Performed by: FAMILY MEDICINE

## 2024-03-19 PROCEDURE — 3017F COLORECTAL CA SCREEN DOC REV: CPT | Performed by: FAMILY MEDICINE

## 2024-03-19 PROCEDURE — 99214 OFFICE O/P EST MOD 30 MIN: CPT | Performed by: FAMILY MEDICINE

## 2024-03-19 PROCEDURE — G8427 DOCREV CUR MEDS BY ELIG CLIN: HCPCS | Performed by: FAMILY MEDICINE

## 2024-03-19 PROCEDURE — G8484 FLU IMMUNIZE NO ADMIN: HCPCS | Performed by: FAMILY MEDICINE

## 2024-03-19 PROCEDURE — G8417 CALC BMI ABV UP PARAM F/U: HCPCS | Performed by: FAMILY MEDICINE

## 2024-03-19 PROCEDURE — 2022F DILAT RTA XM EVC RTNOPTHY: CPT | Performed by: FAMILY MEDICINE

## 2024-03-19 PROCEDURE — 1123F ACP DISCUSS/DSCN MKR DOCD: CPT | Performed by: FAMILY MEDICINE

## 2024-03-19 PROCEDURE — 1090F PRES/ABSN URINE INCON ASSESS: CPT | Performed by: FAMILY MEDICINE

## 2024-03-19 PROCEDURE — 3044F HG A1C LEVEL LT 7.0%: CPT | Performed by: FAMILY MEDICINE

## 2024-03-19 PROCEDURE — 1036F TOBACCO NON-USER: CPT | Performed by: FAMILY MEDICINE

## 2024-03-19 ASSESSMENT — PATIENT HEALTH QUESTIONNAIRE - PHQ9
SUM OF ALL RESPONSES TO PHQ QUESTIONS 1-9: 0
SUM OF ALL RESPONSES TO PHQ9 QUESTIONS 1 & 2: 0
2. FEELING DOWN, DEPRESSED OR HOPELESS: NOT AT ALL
SUM OF ALL RESPONSES TO PHQ QUESTIONS 1-9: 0
1. LITTLE INTEREST OR PLEASURE IN DOING THINGS: NOT AT ALL
SUM OF ALL RESPONSES TO PHQ QUESTIONS 1-9: 0
SUM OF ALL RESPONSES TO PHQ QUESTIONS 1-9: 0

## 2024-03-19 NOTE — PROGRESS NOTES
Identified pt with two pt identifiers (name and ). Reviewed chart in preparation for visit and have obtained necessary documentation.    Billie Nelson is a 68 y.o. female  Chief Complaint   Patient presents with    Weight Management     1 month follow up     /76 (Site: Right Upper Arm, Position: Sitting, Cuff Size: Large Adult) Comment: manual  Pulse 55   Temp 98 °F (36.7 °C) (Oral)   Resp 20   Ht 1.626 m (5' 4\")   Wt 102.2 kg (225 lb 3.2 oz)   SpO2 98%   BMI 38.66 kg/m²     1. Have you been to the ER, urgent care clinic since your last visit?  Hospitalized since your last visit?no    2. Have you seen or consulted any other health care providers outside of the [facility] since your last visit?  Include any pap smears or colon screening. No    BMI - 38.5    Patient and provider made aware of elevated BP x2. Patient asymptomatic. Patient reminded to monitor BP, continue to take BP medications if prescribed, and follow up with PCP/Cardiologist.  Patient expressed understanding and agreement.      
tablets by mouth daily       No current facility-administered medications for this visit.          Participation   Did you attend clinic and class last week? no    Review of Systems  Since your last visit, have you experienced any complications? no  If yes, please list:       Are you taking an appetite suppressant? yes  If so, is there any Chest Pain, Palpitations or Dizziness?      HUNGER CONTROL: fair, using cucumber and salsa when hungry    BP Readings from Last 3 Encounters:   03/19/24 130/76   03/11/24 137/83   03/04/24 116/74       SLEEP:resting better. She thinks she is sleeping more hours. In bed by 10 and up at 530-630    Have you received any other medical care this week? no  If yes, where and for what?       Have you discontinued or changed any medicine or dose of your medicine since your last visit with Dr Alford? no  If yes, where and for what?         Diet  How many ounces of calorie-free liquids did you consume each day?  At least 64 oz    How many meal replacements did you take each day? 3    Did you have any problems adhering to the program?  no If yes, please explain:      Exercise  Aerobic exercise:45 min once a week working with a  now at [facility] once a week and then 1 day on her own is the plan. Walking 1-2 times  a week   Resistance exercise: 1-2 workouts / week  Any discomfort?       If yes, where?    LABS:   recheck in june        Objective  /76 (Site: Right Upper Arm, Position: Sitting, Cuff Size: Large Adult) Comment: manual  Pulse 55   Temp 98 °F (36.7 °C) (Oral)   Resp 20   Ht 1.626 m (5' 4\")   Wt 102.2 kg (225 lb 3.2 oz)   SpO2 98%   BMI 38.66 kg/m²   No LMP recorded. Patient is postmenopausal.      Physical Exam  Appearance: well,   Mental:A&O x 3, NAD  H:NC/AT,  EENT:   EOMI, PERRL, No scleral icterus  Neck: no bruit or JVD  Lung: clear, No W/R  ABD: soft, active, nontender  Ext:  no Edema  Neuro: nonfocal          Assessment / Plan    Billie Nelson was seen today

## 2024-03-20 NOTE — PROGRESS NOTES
[facility]  Metabolic Weight Loss Program        Patient's Name: Billie Nelson  : 1955    This patient is a participant at [facility] and attended the weekly virtual nutrition class hosted via [employer].      Tiffany Fox, MS, RD, LDN

## 2024-03-21 ENCOUNTER — OFFICE VISIT (OUTPATIENT)
Age: 69
End: 2024-03-21

## 2024-03-21 DIAGNOSIS — E66.01 CLASS 2 SEVERE OBESITY DUE TO EXCESS CALORIES WITH SERIOUS COMORBIDITY AND BODY MASS INDEX (BMI) OF 39.0 TO 39.9 IN ADULT (HCC): Primary | ICD-10-CM

## 2024-03-22 NOTE — PROGRESS NOTES
[facility]  Metabolic Weight Loss Program        Patient's Name: Billie Nelson  : 1955    This patient is a participant at [facility] and attended the weekly virtual nutrition class hosted via [employer].      Tiffany Fox, MS, RD, LDN

## 2024-03-25 DIAGNOSIS — G47.09 OTHER INSOMNIA: ICD-10-CM

## 2024-03-26 ENCOUNTER — OFFICE VISIT (OUTPATIENT)
Age: 69
End: 2024-03-26

## 2024-03-26 DIAGNOSIS — E66.01 CLASS 2 SEVERE OBESITY DUE TO EXCESS CALORIES WITH SERIOUS COMORBIDITY AND BODY MASS INDEX (BMI) OF 39.0 TO 39.9 IN ADULT (HCC): Primary | ICD-10-CM

## 2024-03-26 NOTE — PROGRESS NOTES
[facility]  Metabolic Weight Loss Program        Patient's Name: Billie Nelson  : 1955    This patient is a participant at [facility] and attended the weekly virtual nutrition class hosted via [employer].      Tiffany Fox, MS, RD, LDN

## 2024-03-26 NOTE — PROGRESS NOTES
[facility]  Metabolic Program Follow-up Nutrition Consult    Date: 3/26/2024   Physician: Ellyn Alford MD  Name: Billie Nelson  :  1955    Type of Plan: VLCD to LCD  Weeks on Plan: 2 months VLCD, switched to LCD today  Virtual visit was completed through Zoom.    ASSESSMENT:    Medications/Supplements:   Prior to Admission medications    Medication Sig Start Date End Date Taking? Authorizing Provider   Pseudoephedrine-APAP-DM (DAYQUIL PO) Take 1 capsule by mouth 2 times daily as needed    Obdulio Alarcon MD   Dextromethorphan Polistirex (COUGH DM PO) Take 5 mLs by mouth as needed    Obdulio Alarcon MD   vitamin C (ASCORBIC ACID) 500 MG tablet Take 1 tablet by mouth as needed    Obdulio Alarcon MD   valACYclovir (VALTREX) 500 MG tablet Take 1 tablet by mouth daily 23   Obdulio Alarcon MD   topiramate (TOPAMAX) 25 MG tablet Take 1 tablet by mouth Daily with supper 24   Ellyn Alford MD   amoxicillin (AMOXIL) 500 MG capsule TAKE 4 CAPSULES PRIOR TO PROCEDURE. 10/9/23   Bruce Mccall MD   ZINC PO Take 25 mg by mouth daily    ProviderObdulio MD   pravastatin (PRAVACHOL) 20 MG tablet Take 1 tablet by mouth daily 23   Obdulio Alarcon MD   meloxicam (MOBIC) 15 MG tablet Take 1 tablet by mouth daily  Patient taking differently: Take 1 tablet by mouth as needed 23   Sylvia Agustin PA-C   Multiple Vitamin (MULTIVITAMIN PO) Take 1 tablet by mouth daily    Automatic Reconciliation, Ar   diclofenac sodium (VOLTAREN) 1 % GEL Apply topically as needed    Automatic Reconciliation, Ar   metFORMIN (GLUCOPHAGE-XR) 500 MG extended release tablet Take 2 tablets by mouth daily 3/20/21   Automatic Reconciliation, Ar              Starting Weight: 246#  Current Weight: 225# (233# last visit one month ago)  Overall Pounds Lost: 21# Overall Pounds Gained: 0  Maintained weight since starting LCD    Exercise/Physical Activity:gym once a week.

## 2024-03-27 RX ORDER — HYDROXYZINE HYDROCHLORIDE 25 MG/1
25 TABLET, FILM COATED ORAL NIGHTLY
Qty: 90 TABLET | Refills: 1 | Status: SHIPPED | OUTPATIENT
Start: 2024-03-27

## 2024-04-01 ENCOUNTER — NURSE ONLY (OUTPATIENT)
Age: 69
End: 2024-04-01

## 2024-04-01 VITALS
SYSTOLIC BLOOD PRESSURE: 115 MMHG | BODY MASS INDEX: 37.78 KG/M2 | HEART RATE: 61 BPM | RESPIRATION RATE: 16 BRPM | HEIGHT: 64 IN | DIASTOLIC BLOOD PRESSURE: 77 MMHG | TEMPERATURE: 98.3 F | OXYGEN SATURATION: 98 % | WEIGHT: 221.3 LBS

## 2024-04-01 DIAGNOSIS — E78.2 MIXED HYPERLIPIDEMIA: ICD-10-CM

## 2024-04-01 DIAGNOSIS — E11.9 TYPE 2 DIABETES MELLITUS WITHOUT COMPLICATION, WITHOUT LONG-TERM CURRENT USE OF INSULIN (HCC): ICD-10-CM

## 2024-04-01 DIAGNOSIS — E66.01 CLASS 2 SEVERE OBESITY DUE TO EXCESS CALORIES WITH SERIOUS COMORBIDITY AND BODY MASS INDEX (BMI) OF 39.0 TO 39.9 IN ADULT (HCC): Primary | ICD-10-CM

## 2024-04-01 ASSESSMENT — PATIENT HEALTH QUESTIONNAIRE - PHQ9
SUM OF ALL RESPONSES TO PHQ9 QUESTIONS 1 & 2: 0
SUM OF ALL RESPONSES TO PHQ QUESTIONS 1-9: 0
2. FEELING DOWN, DEPRESSED OR HOPELESS: NOT AT ALL
1. LITTLE INTEREST OR PLEASURE IN DOING THINGS: NOT AT ALL
SUM OF ALL RESPONSES TO PHQ QUESTIONS 1-9: 0

## 2024-04-03 ENCOUNTER — OFFICE VISIT (OUTPATIENT)
Age: 69
End: 2024-04-03

## 2024-04-03 DIAGNOSIS — E66.01 CLASS 2 SEVERE OBESITY DUE TO EXCESS CALORIES WITH SERIOUS COMORBIDITY AND BODY MASS INDEX (BMI) OF 39.0 TO 39.9 IN ADULT (HCC): Primary | ICD-10-CM

## 2024-04-03 NOTE — PROGRESS NOTES
Nurse note from patient's weekly VLCD / LCD / Maintenance class was reviewed.  Pertinent medical concerns were:   reviewed     BP Readings from Last 3 Encounters:   04/01/24 115/77   03/19/24 130/76   03/11/24 137/83       Failed to redirect to the Timeline version of the [facility] SmartLink.    Current Outpatient Medications   Medication Sig Dispense Refill    Dextromethorphan Polistirex (COUGH DM PO) Take 5 mLs by mouth as needed      vitamin C (ASCORBIC ACID) 500 MG tablet Take 1 tablet by mouth as needed      valACYclovir (VALTREX) 500 MG tablet Take 1 tablet by mouth daily      topiramate (TOPAMAX) 25 MG tablet Take 1 tablet by mouth Daily with supper 60 tablet 2    amoxicillin (AMOXIL) 500 MG capsule TAKE 4 CAPSULES PRIOR TO PROCEDURE. 4 capsule 1    ZINC PO Take 25 mg by mouth daily      pravastatin (PRAVACHOL) 20 MG tablet Take 1 tablet by mouth daily      meloxicam (MOBIC) 15 MG tablet Take 1 tablet by mouth daily (Patient taking differently: Take 1 tablet by mouth as needed) 30 tablet 3    Multiple Vitamin (MULTIVITAMIN PO) Take 1 tablet by mouth daily      diclofenac sodium (VOLTAREN) 1 % GEL Apply topically as needed      metFORMIN (GLUCOPHAGE-XR) 500 MG extended release tablet Take 2 tablets by mouth daily      NONFORMULARY Inject as directed Cortisone injection in left knee every 3 months      hydrOXYzine HCl (ATARAX) 25 MG tablet TAKE 1 TABLET BY MOUTH EVERY DAY AT NIGHT 90 tablet 1    Pseudoephedrine-APAP-DM (DAYQUIL PO) Take 1 capsule by mouth 2 times daily as needed       No current facility-administered medications for this visit.

## 2024-04-05 NOTE — PROGRESS NOTES
Identified pt with two pt identifiers (name and ). Reviewed chart in preparation for visit and have obtained necessary documentation.    Billie Nelson is a 68 y.o. female  Chief Complaint   Patient presents with    Weight Management     /77 (Site: Right Upper Arm, Position: Sitting, Cuff Size: Large Adult)   Pulse 61   Temp 98.3 °F (36.8 °C) (Oral)   Resp 16   Ht 1.626 m (5' 4\")   Wt 100.4 kg (221 lb 4.8 oz)   SpO2 98%   BMI 37.99 kg/m²     1. Have you been to the ER, urgent care clinic since your last visit?  Hospitalized since your last visit?no    2. Have you seen or consulted any other health care providers outside of the [facility] since your last visit?  Include any pap smears or colon screening. no  
Nurse note from patient's weekly VLCD / LCD / Maintenance class was reviewed.  Pertinent medical concerns were:   reviewed     BP Readings from Last 3 Encounters:   04/01/24 115/77   03/19/24 130/76   03/11/24 137/83       Failed to redirect to the Timeline version of the [facility] SmartLink.    Current Outpatient Medications   Medication Sig Dispense Refill    NONFORMULARY Inject as directed Cortisone injection in left knee every 3 months      hydrOXYzine HCl (ATARAX) 25 MG tablet TAKE 1 TABLET BY MOUTH EVERY DAY AT NIGHT 90 tablet 1    Pseudoephedrine-APAP-DM (DAYQUIL PO) Take 1 capsule by mouth 2 times daily as needed      Dextromethorphan Polistirex (COUGH DM PO) Take 5 mLs by mouth as needed      vitamin C (ASCORBIC ACID) 500 MG tablet Take 1 tablet by mouth as needed      valACYclovir (VALTREX) 500 MG tablet Take 1 tablet by mouth daily      topiramate (TOPAMAX) 25 MG tablet Take 1 tablet by mouth Daily with supper 60 tablet 2    amoxicillin (AMOXIL) 500 MG capsule TAKE 4 CAPSULES PRIOR TO PROCEDURE. 4 capsule 1    ZINC PO Take 25 mg by mouth daily      pravastatin (PRAVACHOL) 20 MG tablet Take 1 tablet by mouth daily      meloxicam (MOBIC) 15 MG tablet Take 1 tablet by mouth daily (Patient taking differently: Take 1 tablet by mouth as needed) 30 tablet 3    Multiple Vitamin (MULTIVITAMIN PO) Take 1 tablet by mouth daily      diclofenac sodium (VOLTAREN) 1 % GEL Apply topically as needed      metFORMIN (GLUCOPHAGE-XR) 500 MG extended release tablet Take 2 tablets by mouth daily       No current facility-administered medications for this visit.       
Note: Weekly Education Class in the [facility]         Patient is on Very Low Calorie Diet [] (4 meal replacements per day, 800 kcal/day)      Low Calorie Diet [x] (2-3 meal replacements per day, 9876-5660 kcal/day)    1) Did patient have any new symptoms or physical problems?   Yes [x]    No []    If yes, check & comment: weakness [], fatigue [], lightheadedness [], headache [], cramps [], cold intolerance [], hair loss [], diarrhea [], constipation [],  NA [] other: left knee pain, aching                                2) Has patient had any medical attention from other providers, urgent care or the emergency room this week?  Yes []  No [x]       NA [], If yes, why:                                       3) Any other sugar sweetened beverages consumed this week?   Yes []  No [x]    4) Did patient have any problems adhering to the diet? Yes []  No [] NA []    If yes, Vacation [], Celebrations [], Conferences [], Family Reunions [] other: transitioning to new plan                                               5) How many hours of sleep this week? NOT ANSWERED      (range)  NA []    Number of meal replacements consumed daily? 1-3 (range)  NA []    Average ounces of water patient consumed daily this week (not including shakes)? 59     (divide the weekly total by 7)    Did you eat any food outside of the program? Yes [x] No []    Physical Activity Over the Past Week:    Cardio exercise: 0 min  Strength exercise: 0 workouts / week  Number of steps walked per day: 5779-1320    How has patient mood overall been this week? Sad [], Happy [], Stressed [], Tired [], Content [], NA [], other Several different moods             Medications reconciled by nurse Yes [x]  No[]    Patient was given therapeutic recommendations for any noted side effects of their dietary approach based upon [facility] patient manual per providers recommendation.

## 2024-04-08 NOTE — PROGRESS NOTES
[facility]  Metabolic Weight Loss Program        Patient's Name: Billie Nelson  : 1955    This patient is a participant at [facility] and attended the weekly virtual nutrition class hosted via [employer].      Tiffany Fox, MS, RD, LDN

## 2024-04-10 ENCOUNTER — OFFICE VISIT (OUTPATIENT)
Age: 69
End: 2024-04-10

## 2024-04-10 DIAGNOSIS — E66.01 CLASS 2 SEVERE OBESITY DUE TO EXCESS CALORIES WITH SERIOUS COMORBIDITY AND BODY MASS INDEX (BMI) OF 39.0 TO 39.9 IN ADULT (HCC): Primary | ICD-10-CM

## 2024-04-16 ENCOUNTER — OFFICE VISIT (OUTPATIENT)
Age: 69
End: 2024-04-16
Payer: MEDICARE

## 2024-04-16 ENCOUNTER — TELEPHONE (OUTPATIENT)
Age: 69
End: 2024-04-16

## 2024-04-16 VITALS
WEIGHT: 218.9 LBS | RESPIRATION RATE: 18 BRPM | HEART RATE: 57 BPM | SYSTOLIC BLOOD PRESSURE: 134 MMHG | OXYGEN SATURATION: 97 % | HEIGHT: 64 IN | BODY MASS INDEX: 37.37 KG/M2 | DIASTOLIC BLOOD PRESSURE: 88 MMHG | TEMPERATURE: 98.1 F

## 2024-04-16 DIAGNOSIS — E66.01 CLASS 2 SEVERE OBESITY DUE TO EXCESS CALORIES WITH SERIOUS COMORBIDITY AND BODY MASS INDEX (BMI) OF 39.0 TO 39.9 IN ADULT (HCC): Primary | ICD-10-CM

## 2024-04-16 DIAGNOSIS — E11.9 TYPE 2 DIABETES MELLITUS WITHOUT COMPLICATION, WITHOUT LONG-TERM CURRENT USE OF INSULIN (HCC): ICD-10-CM

## 2024-04-16 DIAGNOSIS — E78.2 MIXED HYPERLIPIDEMIA: ICD-10-CM

## 2024-04-16 DIAGNOSIS — G47.09 OTHER INSOMNIA: ICD-10-CM

## 2024-04-16 PROCEDURE — 99214 OFFICE O/P EST MOD 30 MIN: CPT | Performed by: FAMILY MEDICINE

## 2024-04-16 PROCEDURE — 2022F DILAT RTA XM EVC RTNOPTHY: CPT | Performed by: FAMILY MEDICINE

## 2024-04-16 PROCEDURE — 3017F COLORECTAL CA SCREEN DOC REV: CPT | Performed by: FAMILY MEDICINE

## 2024-04-16 PROCEDURE — 1123F ACP DISCUSS/DSCN MKR DOCD: CPT | Performed by: FAMILY MEDICINE

## 2024-04-16 PROCEDURE — G8399 PT W/DXA RESULTS DOCUMENT: HCPCS | Performed by: FAMILY MEDICINE

## 2024-04-16 PROCEDURE — 1036F TOBACCO NON-USER: CPT | Performed by: FAMILY MEDICINE

## 2024-04-16 PROCEDURE — G8427 DOCREV CUR MEDS BY ELIG CLIN: HCPCS | Performed by: FAMILY MEDICINE

## 2024-04-16 PROCEDURE — 3044F HG A1C LEVEL LT 7.0%: CPT | Performed by: FAMILY MEDICINE

## 2024-04-16 PROCEDURE — 1090F PRES/ABSN URINE INCON ASSESS: CPT | Performed by: FAMILY MEDICINE

## 2024-04-16 PROCEDURE — G8417 CALC BMI ABV UP PARAM F/U: HCPCS | Performed by: FAMILY MEDICINE

## 2024-04-16 ASSESSMENT — PATIENT HEALTH QUESTIONNAIRE - PHQ9
SUM OF ALL RESPONSES TO PHQ QUESTIONS 1-9: 0
SUM OF ALL RESPONSES TO PHQ QUESTIONS 1-9: 0
1. LITTLE INTEREST OR PLEASURE IN DOING THINGS: NOT AT ALL
SUM OF ALL RESPONSES TO PHQ QUESTIONS 1-9: 0
2. FEELING DOWN, DEPRESSED OR HOPELESS: NOT AT ALL
SUM OF ALL RESPONSES TO PHQ9 QUESTIONS 1 & 2: 0
SUM OF ALL RESPONSES TO PHQ QUESTIONS 1-9: 0

## 2024-04-16 NOTE — TELEPHONE ENCOUNTER
patient has a scheduled appointment on 4/30/24 with dietitijeremy Allen. However, Dr Alford would like her to be seen sooner; sent email to Tiffany will take review schedule.   Patient added to wait-list for sooner appointment with dietitijeremy Allen.

## 2024-04-16 NOTE — PROGRESS NOTES
Identified pt with two pt identifiers (name and ). Reviewed chart in preparation for visit and have obtained necessary documentation.    Billie Nelson is a 68 y.o. female  Chief Complaint   Patient presents with    Weight Management     1 month follow up     /88 (Site: Right Upper Arm, Position: Sitting, Cuff Size: Small Adult) Comment: manual  Pulse 57   Temp 98.1 °F (36.7 °C) (Oral)   Resp 18   Ht 1.626 m (5' 4\")   Wt 99.3 kg (218 lb 14.4 oz)   SpO2 97%   BMI 37.57 kg/m²     1. Have you been to the ER, urgent care clinic since your last visit?  Hospitalized since your last visit?no    2. Have you seen or consulted any other health care providers outside of the [facility] since your last visit?  Include any pap smears or colon screening. No    BMI - 37.5

## 2024-04-16 NOTE — PROGRESS NOTES
[facility] Progress Note:   F/up Physician Visit    CC: Weight Management      Billie Nelson is a 68 y.o. female who is here for her  f/up physician visit for the LCD Program.    March 225  Now 218      She is experiencing hairloss/thinning  This happened in the past after a perm  She started using a texturizer now which is milder        4/16/2024     8:07 AM 4/16/2024     8:00 AM 4/12/2024    10:02 AM 4/1/2024     8:34 AM 3/19/2024     9:10 AM 3/19/2024     9:00 AM 3/11/2024     9:07 AM   Weight Metrics   Weight 218 lb 14.4 oz  217 lb 221 lb 4.8 oz 225 lb 3.2 oz  228 lb 12.8 oz   Neck (Inches)  14.25 in    14.5 in    Waist Measure Inches  44.75 in    44.75 in    Body Fat %  45.4 %    46.1 %    BMI (Calculated) 37.7 kg/m2  37.3 kg/m2 38.1 kg/m2 38.7 kg/m2  39.4 kg/m2          No data to display                   Current Outpatient Medications   Medication Sig Dispense Refill    amoxicillin (AMOXIL) 500 MG capsule TAKE 4 CAPSULES PRIOR TO PROCEDURE. 4 capsule 4    NONFORMULARY Inject as directed Cortisone injection in left knee every 3 months      Pseudoephedrine-APAP-DM (DAYQUIL PO) Take 1 capsule by mouth 2 times daily as needed      vitamin C (ASCORBIC ACID) 500 MG tablet Take 1 tablet by mouth as needed      valACYclovir (VALTREX) 500 MG tablet Take 1 tablet by mouth daily      topiramate (TOPAMAX) 25 MG tablet Take 1 tablet by mouth Daily with supper 60 tablet 2    ZINC PO Take 25 mg by mouth daily      pravastatin (PRAVACHOL) 20 MG tablet Take 1 tablet by mouth daily      meloxicam (MOBIC) 15 MG tablet Take 1 tablet by mouth daily (Patient taking differently: Take 1 tablet by mouth as needed) 30 tablet 3    Multiple Vitamin (MULTIVITAMIN PO) Take 1 tablet by mouth daily      diclofenac sodium (VOLTAREN) 1 % GEL Apply topically as needed      metFORMIN (GLUCOPHAGE-XR) 500 MG extended release tablet Take 2 tablets by mouth daily       No current facility-administered medications for this

## 2024-04-17 ENCOUNTER — OFFICE VISIT (OUTPATIENT)
Age: 69
End: 2024-04-17

## 2024-04-17 DIAGNOSIS — E66.01 CLASS 2 SEVERE OBESITY DUE TO EXCESS CALORIES WITH SERIOUS COMORBIDITY AND BODY MASS INDEX (BMI) OF 39.0 TO 39.9 IN ADULT (HCC): Primary | ICD-10-CM

## 2024-04-22 NOTE — PROGRESS NOTES
[facility]  Metabolic Weight Loss Program        Patient's Name: Billie Nelson  : 1955    This patient is a participant at [facility] and attended the weekly virtual nutrition class hosted via [employer].      Tiffany Fox, MS, RD, LDN

## 2024-04-24 ENCOUNTER — OFFICE VISIT (OUTPATIENT)
Age: 69
End: 2024-04-24

## 2024-04-24 ENCOUNTER — TELEPHONE (OUTPATIENT)
Age: 69
End: 2024-04-24

## 2024-04-24 DIAGNOSIS — E66.01 CLASS 2 SEVERE OBESITY DUE TO EXCESS CALORIES WITH SERIOUS COMORBIDITY AND BODY MASS INDEX (BMI) OF 39.0 TO 39.9 IN ADULT (HCC): Primary | ICD-10-CM

## 2024-04-24 NOTE — TELEPHONE ENCOUNTER
Patient listed on wait list for a sooner appointment w/ Tiffany VELA; Contacted patient; no answer; left VM

## 2024-04-29 ENCOUNTER — NURSE ONLY (OUTPATIENT)
Age: 69
End: 2024-04-29

## 2024-04-29 VITALS
OXYGEN SATURATION: 97 % | BODY MASS INDEX: 36.96 KG/M2 | TEMPERATURE: 98.1 F | HEIGHT: 64 IN | HEART RATE: 57 BPM | WEIGHT: 216.5 LBS | DIASTOLIC BLOOD PRESSURE: 80 MMHG | RESPIRATION RATE: 20 BRPM | SYSTOLIC BLOOD PRESSURE: 130 MMHG

## 2024-04-29 DIAGNOSIS — E78.2 MIXED HYPERLIPIDEMIA: ICD-10-CM

## 2024-04-29 DIAGNOSIS — E66.01 CLASS 2 SEVERE OBESITY DUE TO EXCESS CALORIES WITH SERIOUS COMORBIDITY AND BODY MASS INDEX (BMI) OF 39.0 TO 39.9 IN ADULT (HCC): Primary | ICD-10-CM

## 2024-04-29 DIAGNOSIS — E11.9 TYPE 2 DIABETES MELLITUS WITHOUT COMPLICATION, WITHOUT LONG-TERM CURRENT USE OF INSULIN (HCC): ICD-10-CM

## 2024-04-29 RX ORDER — DEXTROMETHORPHAN HYDROBROMIDE AND PROMETHAZINE HYDROCHLORIDE 15; 6.25 MG/5ML; MG/5ML
5 SYRUP ORAL AS NEEDED
COMMUNITY
Start: 2024-04-24 | End: 2024-05-04

## 2024-04-29 ASSESSMENT — PATIENT HEALTH QUESTIONNAIRE - PHQ9
SUM OF ALL RESPONSES TO PHQ QUESTIONS 1-9: 0
2. FEELING DOWN, DEPRESSED OR HOPELESS: NOT AT ALL
SUM OF ALL RESPONSES TO PHQ9 QUESTIONS 1 & 2: 0
1. LITTLE INTEREST OR PLEASURE IN DOING THINGS: NOT AT ALL

## 2024-04-29 NOTE — PROGRESS NOTES
Identified pt with two pt identifiers (name and ). Reviewed chart in preparation for visit and have obtained necessary documentation.    Billie Nelson is a 68 y.o. female  Chief Complaint   Patient presents with    Weight Management     /80 (Site: Right Upper Arm, Position: Sitting, Cuff Size: Large Adult) Comment: manual - cannot recheck in left arm  Pulse 57   Temp 98.1 °F (36.7 °C) (Oral)   Resp 20   Ht 1.626 m (5' 4\")   SpO2 97%   BMI 37.57 kg/m²     1. Have you been to the ER, urgent care clinic since your last visit?  Hospitalized since your last visit?yes - [facility] for cold    2. Have you seen or consulted any other health care providers outside of the [facility] since your last visit?  Include any pap smears or colon screening. yes - Called PCP    Patient and provider made aware of elevated BP x2. Patient asymptomatic. Patient reminded to monitor BP, continue to take BP medications if prescribed, and follow up with PCP/Cardiologist.  Patient expressed understanding and agreement.

## 2024-04-29 NOTE — PROGRESS NOTES
Progress Note: Weekly Education Class in the [facility]         Patient is on Very Low Calorie Diet [] (4 meal replacements per day, 800 kcal/day)      Low Calorie Diet [x] (2-3 meal replacements per day, 1365-5057 kcal/day)    1) Did patient have any new symptoms or physical problems?   Yes [x]    No []    If yes, check & comment: weakness [], fatigue [], lightheadedness [], headache [], cramps [], cold intolerance [], hair loss [], diarrhea [], constipation [],  NA [] other: sore throat, cold                                2) Has patient had any medical attention from other providers, urgent care or the emergency room this week?  Yes [x]  No []       NA [], If yes, why: sore throat, cold                                      3) Any other sugar sweetened beverages consumed this week?   Yes []  No [x]    4) Did patient have any problems adhering to the diet? Yes []  No [] NA []    If yes, Vacation [], Celebrations [], Conferences [], Family Reunions [] other: NOT ANSWERED                                                 5) How many hours of sleep this week? NOT ANSWERED      (range)  NA []    Number of meal replacements consumed daily? 1-2 (range)  NA []    Average ounces of water patient consumed daily this week (not including shakes)? 64     (divide the weekly total by 7)    Did you eat any food outside of the program? Yes [x] No []    Physical Activity Over the Past Week:    Cardio exercise: 0 min  Strength exercise: 0 workouts / week  Number of steps walked per day: 0    How has patient mood overall been this week? Sad [], Happy [], Stressed [], Tired [], Content [], NA [], other good           Medications reconciled by nurse Yes [x]  No[]    Patient was given therapeutic recommendations for any noted side effects of their dietary approach based upon [facility] patient manual per providers recommendation.

## 2024-04-30 ENCOUNTER — OFFICE VISIT (OUTPATIENT)
Age: 69
End: 2024-04-30

## 2024-04-30 DIAGNOSIS — E66.01 CLASS 2 SEVERE OBESITY DUE TO EXCESS CALORIES WITH SERIOUS COMORBIDITY AND BODY MASS INDEX (BMI) OF 39.0 TO 39.9 IN ADULT (HCC): Primary | ICD-10-CM

## 2024-04-30 NOTE — PROGRESS NOTES
month ago)  Overall Pounds Lost: 30# Overall Pounds Gained: 0  Goal lose 47# to get under 200#, 13# to go.    Exercise/Physical Activity:knee pain, limited mobility. No structured exercise.    Is patient using [facility] products:yes  If yes, how many per day:2    Aversions/side effects of product/program:none    Fluids used to mix with products:water    Reported Diet History:  Prior to the program, liked cereal and cream of wheat with banana.  Smoothies with fruit. Missing those things.     Busy days:  1 meal, fish baked (1/2 of it) 4-5 ounces and greens, and one ND packet.  Skipping a lunch meal.    Today so far 1 ND shake.  May have a snack such as yogurt, guac, salsa, or hummus with cucumbers. Crispy onions 2T    Doing well with going out to dinner. Used to be very often, now limits to 1-2 days a week.  Passing on dessert now, which she is proud of.    Barriers/concerns preventing patient from achieving goal(s) since last visit:  Doing well, she was concerned not getting enough protein.    NUTRITION INTERVENTION:  Pt educated on nutrition recommendations for the [facility] Low Calorie Plan (LCD), with the specific meal pattern of 2 meal replacements every day plus a grocery meal and snack.  Daily recommended totals: 1200 calories, 60 grams carbs, 80+ grams protein, and remaining calories as healthy fats.  Use LCD handout for meal and snack suggestions and preparation.    Grocery meal:  Use the balanced plate method to plan meals, include 3-6 oz of lean source of protein, unlimited non-starchy vegetables, 1/2 cup whole grains/beans OR 1/2 cup fruit OR 1 serving of low fat dairy. Utilize handouts listing healthy snack and meal ideas.     Read all nutrition labels. Demonstrated and emphasized identifying serving size, total fat, sugar and protein content. Defined low fat as </= 3 g per serving. Discussed lean and extra lean sources of protein. Avoid foods with sugar listed in the first 3 ingredients and >/10

## 2024-05-01 NOTE — PROGRESS NOTES
[facility]  Metabolic Weight Loss Program        Patient's Name: Billie Nelson  : 1955    This patient is a participant at [facility] and attended the weekly virtual nutrition class hosted via [employer].      Tiffany Fox, MS, RD, LDN

## 2024-05-01 NOTE — PROGRESS NOTES
Nurse note from patient's weekly  / LCD / Maintenance class was reviewed.  Pertinent medical concerns were:   reviewed     BP Readings from Last 3 Encounters:   04/29/24 130/80   04/16/24 134/88   04/01/24 115/77       Failed to redirect to the Timeline version of the [facility].    Current Outpatient Medications   Medication Sig Dispense Refill    promethazine-dextromethorphan (PROMETHAZINE-DM) 6.25-15 MG/5ML syrup Take 5 mLs by mouth as needed      amoxicillin (AMOXIL) 500 MG capsule TAKE 4 CAPSULES PRIOR TO PROCEDURE. 4 capsule 4    NONFORMULARY Inject as directed Cortisone injection in left knee every 3 months      Pseudoephedrine-APAP-DM (DAYQUIL PO) Take 1 capsule by mouth 2 times daily as needed      vitamin C (ASCORBIC ACID) 500 MG tablet Take 1 tablet by mouth as needed      valACYclovir (VALTREX) 500 MG tablet Take 1 tablet by mouth daily      topiramate (TOPAMAX) 25 MG tablet Take 1 tablet by mouth Daily with supper 60 tablet 2    ZINC PO Take 25 mg by mouth daily      pravastatin (PRAVACHOL) 20 MG tablet Take 1 tablet by mouth daily      meloxicam (MOBIC) 15 MG tablet Take 1 tablet by mouth daily (Patient taking differently: Take 1 tablet by mouth as needed) 30 tablet 3    Multiple Vitamin (MULTIVITAMIN PO) Take 1 tablet by mouth daily      diclofenac sodium (VOLTAREN) 1 % GEL Apply topically as needed      metFORMIN (GLUCOPHAGE-XR) 500 MG extended release tablet Take 2 tablets by mouth daily       No current facility-administered medications for this visit.

## 2024-05-02 ENCOUNTER — OFFICE VISIT (OUTPATIENT)
Age: 69
End: 2024-05-02

## 2024-05-02 DIAGNOSIS — E66.01 CLASS 2 SEVERE OBESITY DUE TO EXCESS CALORIES WITH SERIOUS COMORBIDITY AND BODY MASS INDEX (BMI) OF 39.0 TO 39.9 IN ADULT (HCC): Primary | ICD-10-CM

## 2024-05-06 NOTE — PROGRESS NOTES
[facility]  Metabolic Weight Loss Program        Patient's Name: Billie Nelson  : 1955    This patient is a participant at [facility] and attended the weekly virtual nutrition class hosted via [facility].      Tiffany Fox, MS, RD, LDN

## 2024-05-13 ENCOUNTER — NURSE ONLY (OUTPATIENT)
Age: 69
End: 2024-05-13

## 2024-05-13 VITALS
SYSTOLIC BLOOD PRESSURE: 130 MMHG | RESPIRATION RATE: 16 BRPM | WEIGHT: 215.2 LBS | BODY MASS INDEX: 36.74 KG/M2 | HEIGHT: 64 IN | DIASTOLIC BLOOD PRESSURE: 84 MMHG | OXYGEN SATURATION: 98 % | HEART RATE: 59 BPM | TEMPERATURE: 98.3 F

## 2024-05-13 DIAGNOSIS — E11.9 TYPE 2 DIABETES MELLITUS WITHOUT COMPLICATION, WITHOUT LONG-TERM CURRENT USE OF INSULIN (HCC): ICD-10-CM

## 2024-05-13 DIAGNOSIS — E66.01 CLASS 2 SEVERE OBESITY DUE TO EXCESS CALORIES WITH SERIOUS COMORBIDITY AND BODY MASS INDEX (BMI) OF 39.0 TO 39.9 IN ADULT (HCC): Primary | ICD-10-CM

## 2024-05-13 DIAGNOSIS — E78.2 MIXED HYPERLIPIDEMIA: ICD-10-CM

## 2024-05-13 LAB — MAMMOGRAPHY, EXTERNAL: NORMAL

## 2024-05-13 RX ORDER — GUAIFENESIN 600 MG/1
1200 TABLET, EXTENDED RELEASE ORAL EVERY 12 HOURS
COMMUNITY

## 2024-05-13 RX ORDER — AMOXICILLIN AND CLAVULANATE POTASSIUM 875; 125 MG/1; MG/1
1 TABLET, FILM COATED ORAL 2 TIMES DAILY
COMMUNITY
Start: 2024-05-07 | End: 2024-05-17

## 2024-05-13 RX ORDER — CETIRIZINE HYDROCHLORIDE 10 MG/1
10 TABLET ORAL DAILY
COMMUNITY

## 2024-05-13 ASSESSMENT — PATIENT HEALTH QUESTIONNAIRE - PHQ9
SUM OF ALL RESPONSES TO PHQ QUESTIONS 1-9: 0
SUM OF ALL RESPONSES TO PHQ QUESTIONS 1-9: 0
2. FEELING DOWN, DEPRESSED OR HOPELESS: NOT AT ALL
SUM OF ALL RESPONSES TO PHQ9 QUESTIONS 1 & 2: 0
SUM OF ALL RESPONSES TO PHQ QUESTIONS 1-9: 0
SUM OF ALL RESPONSES TO PHQ QUESTIONS 1-9: 0
1. LITTLE INTEREST OR PLEASURE IN DOING THINGS: NOT AT ALL

## 2024-05-13 NOTE — PROGRESS NOTES
Identified pt with two pt identifiers (name and ). Reviewed chart in preparation for visit and have obtained necessary documentation.    Billie Nelson is a 68 y.o. female  Chief Complaint   Patient presents with    Weight Management     /84 (Site: Right Upper Arm, Position: Sitting, Cuff Size: Large Adult)   Pulse 59   Temp 98.3 °F (36.8 °C) (Oral)   Resp 16   Ht 1.626 m (5' 4\")   Wt 97.6 kg (215 lb 3.2 oz)   SpO2 98%   BMI 36.94 kg/m²     1. Have you been to the ER, urgent care clinic since your last visit?  Hospitalized since your last visit?[facility] for lingering cold    2. Have you seen or consulted any other health care providers outside of the [facility] since your last visit?  Include any pap smears or colon screening. No    Patient attended triage but did not bring homework form. Patient instructed to email or fax completed homework form to us. Patient informed that not bringing the homework form can result in not being seen next time.

## 2024-05-20 DIAGNOSIS — E66.01 CLASS 2 SEVERE OBESITY DUE TO EXCESS CALORIES WITH SERIOUS COMORBIDITY AND BODY MASS INDEX (BMI) OF 39.0 TO 39.9 IN ADULT (HCC): ICD-10-CM

## 2024-05-21 ENCOUNTER — TELEPHONE (OUTPATIENT)
Age: 69
End: 2024-05-21

## 2024-05-21 ENCOUNTER — OFFICE VISIT (OUTPATIENT)
Age: 69
End: 2024-05-21
Payer: MEDICARE

## 2024-05-21 VITALS
RESPIRATION RATE: 20 BRPM | SYSTOLIC BLOOD PRESSURE: 116 MMHG | WEIGHT: 214.3 LBS | TEMPERATURE: 98.3 F | DIASTOLIC BLOOD PRESSURE: 77 MMHG | HEIGHT: 64 IN | HEART RATE: 59 BPM | OXYGEN SATURATION: 97 % | BODY MASS INDEX: 36.58 KG/M2

## 2024-05-21 DIAGNOSIS — E78.2 MIXED HYPERLIPIDEMIA: ICD-10-CM

## 2024-05-21 DIAGNOSIS — E11.9 TYPE 2 DIABETES MELLITUS WITHOUT COMPLICATION, WITHOUT LONG-TERM CURRENT USE OF INSULIN (HCC): ICD-10-CM

## 2024-05-21 DIAGNOSIS — E66.01 CLASS 2 SEVERE OBESITY DUE TO EXCESS CALORIES WITH SERIOUS COMORBIDITY AND BODY MASS INDEX (BMI) OF 39.0 TO 39.9 IN ADULT (HCC): Primary | ICD-10-CM

## 2024-05-21 PROCEDURE — G8427 DOCREV CUR MEDS BY ELIG CLIN: HCPCS | Performed by: FAMILY MEDICINE

## 2024-05-21 PROCEDURE — 1123F ACP DISCUSS/DSCN MKR DOCD: CPT | Performed by: FAMILY MEDICINE

## 2024-05-21 PROCEDURE — 99214 OFFICE O/P EST MOD 30 MIN: CPT | Performed by: FAMILY MEDICINE

## 2024-05-21 PROCEDURE — G8417 CALC BMI ABV UP PARAM F/U: HCPCS | Performed by: FAMILY MEDICINE

## 2024-05-21 PROCEDURE — 2022F DILAT RTA XM EVC RTNOPTHY: CPT | Performed by: FAMILY MEDICINE

## 2024-05-21 PROCEDURE — 1090F PRES/ABSN URINE INCON ASSESS: CPT | Performed by: FAMILY MEDICINE

## 2024-05-21 PROCEDURE — 1036F TOBACCO NON-USER: CPT | Performed by: FAMILY MEDICINE

## 2024-05-21 PROCEDURE — 3017F COLORECTAL CA SCREEN DOC REV: CPT | Performed by: FAMILY MEDICINE

## 2024-05-21 PROCEDURE — G8399 PT W/DXA RESULTS DOCUMENT: HCPCS | Performed by: FAMILY MEDICINE

## 2024-05-21 PROCEDURE — 3044F HG A1C LEVEL LT 7.0%: CPT | Performed by: FAMILY MEDICINE

## 2024-05-21 ASSESSMENT — PATIENT HEALTH QUESTIONNAIRE - PHQ9
SUM OF ALL RESPONSES TO PHQ QUESTIONS 1-9: 0
1. LITTLE INTEREST OR PLEASURE IN DOING THINGS: NOT AT ALL
2. FEELING DOWN, DEPRESSED OR HOPELESS: NOT AT ALL
SUM OF ALL RESPONSES TO PHQ9 QUESTIONS 1 & 2: 0

## 2024-05-21 NOTE — PROGRESS NOTES
Identified pt with two pt identifiers (name and ). Reviewed chart in preparation for visit and have obtained necessary documentation.    Billie Nelson is a 68 y.o. female  Chief Complaint   Patient presents with    Weight Management     1 month follow up     /77 (Site: Right Upper Arm, Position: Sitting, Cuff Size: Large Adult)   Pulse 59   Temp 98.3 °F (36.8 °C) (Oral)   Resp 20   Ht 1.626 m (5' 4\")   Wt 97.2 kg (214 lb 4.8 oz)   SpO2 97%   BMI 36.78 kg/m²     1. Have you been to the ER, urgent care clinic since your last visit?  Hospitalized since your last visit?no    2. Have you seen or consulted any other health care providers outside of the [facility] since your last visit?  Include any pap smears or colon screening. No    BMI - 36.7

## 2024-05-21 NOTE — PROGRESS NOTES
[facility] Progress Note:   F/up Physician Visit    CC: Weight Management      Billie Nelson is a 68 y.o. female who is here for her  f/up physician visit for the  LCD Program.  April 218  Now 214      Was sick for 3 weeks with a virus          5/21/2024     9:04 AM 5/21/2024     9:00 AM 5/13/2024     8:43 AM 4/29/2024     8:36 AM 4/16/2024     8:07 AM 4/16/2024     8:00 AM 4/12/2024    10:02 AM   Weight Metrics   Weight 214 lb 4.8 oz  215 lb 3.2 oz 216 lb 8 oz 218 lb 14.4 oz  217 lb   Neck (Inches)  14.75 in    14.25 in    Waist Measure Inches  46 in    44.75 in    Body Fat %  44.9 %    45.4 %    BMI (Calculated) 36.9 kg/m2  37 kg/m2 37.2 kg/m2 37.7 kg/m2  37.3 kg/m2          No data to display                   Current Outpatient Medications   Medication Sig Dispense Refill    cetirizine (ZYRTEC) 10 MG tablet Take 1 tablet by mouth daily      guaiFENesin (MUCINEX) 600 MG extended release tablet Take 2 tablets by mouth in the morning and 2 tablets in the evening.      amoxicillin (AMOXIL) 500 MG capsule TAKE 4 CAPSULES PRIOR TO PROCEDURE. 4 capsule 4    NONFORMULARY Inject as directed Cortisone injection in left knee every 3 months      vitamin C (ASCORBIC ACID) 500 MG tablet Take 1 tablet by mouth as needed      valACYclovir (VALTREX) 500 MG tablet Take 1 tablet by mouth daily      topiramate (TOPAMAX) 25 MG tablet Take 1 tablet by mouth Daily with supper 60 tablet 2    ZINC PO Take 25 mg by mouth daily      pravastatin (PRAVACHOL) 20 MG tablet Take 1 tablet by mouth daily      meloxicam (MOBIC) 15 MG tablet Take 1 tablet by mouth daily (Patient taking differently: Take 1 tablet by mouth as needed) 30 tablet 3    Multiple Vitamin (MULTIVITAMIN PO) Take 1 tablet by mouth daily      diclofenac sodium (VOLTAREN) 1 % GEL Apply topically as needed      metFORMIN (GLUCOPHAGE-XR) 500 MG extended release tablet Take 2 tablets by mouth daily       No current facility-administered medications for

## 2024-05-21 NOTE — TELEPHONE ENCOUNTER
Contacted patient secondary insurance [facility] [employer]; Spoke to Asad; policy active with an effective date of 10/1/2018.

## 2024-05-23 ENCOUNTER — OFFICE VISIT (OUTPATIENT)
Age: 69
End: 2024-05-23

## 2024-05-23 DIAGNOSIS — E66.01 CLASS 2 SEVERE OBESITY DUE TO EXCESS CALORIES WITH SERIOUS COMORBIDITY AND BODY MASS INDEX (BMI) OF 39.0 TO 39.9 IN ADULT (HCC): Primary | ICD-10-CM

## 2024-05-24 NOTE — PROGRESS NOTES
[facility]  Metabolic Weight Loss Program        Patient's Name: Billie Nelson  : 1955    This patient is a participant at [facility] and attended the weekly virtual nutrition class hosted via [employer].      Tiffany Fox, MS, RD, LDN

## 2024-06-03 ENCOUNTER — NURSE ONLY (OUTPATIENT)
Age: 69
End: 2024-06-03

## 2024-06-03 VITALS
TEMPERATURE: 98.3 F | BODY MASS INDEX: 36.35 KG/M2 | HEIGHT: 64 IN | HEART RATE: 79 BPM | WEIGHT: 212.9 LBS | SYSTOLIC BLOOD PRESSURE: 112 MMHG | DIASTOLIC BLOOD PRESSURE: 82 MMHG | OXYGEN SATURATION: 99 % | RESPIRATION RATE: 18 BRPM

## 2024-06-03 DIAGNOSIS — E78.2 MIXED HYPERLIPIDEMIA: ICD-10-CM

## 2024-06-03 DIAGNOSIS — E11.9 TYPE 2 DIABETES MELLITUS WITHOUT COMPLICATION, WITHOUT LONG-TERM CURRENT USE OF INSULIN (HCC): ICD-10-CM

## 2024-06-03 DIAGNOSIS — G47.09 OTHER INSOMNIA: ICD-10-CM

## 2024-06-03 DIAGNOSIS — E66.01 CLASS 2 SEVERE OBESITY DUE TO EXCESS CALORIES WITH SERIOUS COMORBIDITY AND BODY MASS INDEX (BMI) OF 39.0 TO 39.9 IN ADULT (HCC): Primary | ICD-10-CM

## 2024-06-03 ASSESSMENT — PATIENT HEALTH QUESTIONNAIRE - PHQ9
2. FEELING DOWN, DEPRESSED OR HOPELESS: NOT AT ALL
SUM OF ALL RESPONSES TO PHQ QUESTIONS 1-9: 0
SUM OF ALL RESPONSES TO PHQ9 QUESTIONS 1 & 2: 0
SUM OF ALL RESPONSES TO PHQ QUESTIONS 1-9: 0
1. LITTLE INTEREST OR PLEASURE IN DOING THINGS: NOT AT ALL

## 2024-06-03 NOTE — PROGRESS NOTES
Identified pt with two pt identifiers (name and ). Reviewed chart in preparation for visit and have obtained necessary documentation.    Billie Nelson is a 68 y.o. female  Chief Complaint   Patient presents with    Weight Management     /82 (Site: Right Upper Arm, Position: Sitting, Cuff Size: Large Adult)   Pulse 79   Temp 98.3 °F (36.8 °C) (Oral)   Resp 18   Ht 1.626 m (5' 4\")   Wt 96.6 kg (212 lb 14.4 oz)   SpO2 99%   BMI 36.54 kg/m²     1. Have you been to the ER, urgent care clinic since your last visit?  Hospitalized since your last visit?no    2. Have you seen or consulted any other health care providers outside of the [facility] since your last visit?  Include any pap smears or colon screening. No    Patient attended triage but did not bring homework form. Patient instructed to email or fax completed homework form to us. Patient informed that not bringing the homework form can result in not being seen next time.

## 2024-06-03 NOTE — PROGRESS NOTES
Nurse note from patient's weekly / LCD / Maintenance class was reviewed.  Pertinent medical concerns were:   reviewed   No homework  BP Readings from Last 3 Encounters:   05/21/24 116/77   05/13/24 130/84   04/29/24 130/80       Failed to redirect to the Timeline version of the [facility] SmartLink.    Current Outpatient Medications   Medication Sig Dispense Refill    cetirizine (ZYRTEC) 10 MG tablet Take 1 tablet by mouth daily      guaiFENesin (MUCINEX) 600 MG extended release tablet Take 2 tablets by mouth in the morning and 2 tablets in the evening.      amoxicillin (AMOXIL) 500 MG capsule TAKE 4 CAPSULES PRIOR TO PROCEDURE. 4 capsule 4    NONFORMULARY Inject as directed Cortisone injection in left knee every 3 months      vitamin C (ASCORBIC ACID) 500 MG tablet Take 1 tablet by mouth as needed      valACYclovir (VALTREX) 500 MG tablet Take 1 tablet by mouth daily      topiramate (TOPAMAX) 25 MG tablet Take 1 tablet by mouth Daily with supper 60 tablet 2    ZINC PO Take 25 mg by mouth daily      pravastatin (PRAVACHOL) 20 MG tablet Take 1 tablet by mouth daily      meloxicam (MOBIC) 15 MG tablet Take 1 tablet by mouth daily (Patient taking differently: Take 1 tablet by mouth as needed) 30 tablet 3    Multiple Vitamin (MULTIVITAMIN PO) Take 1 tablet by mouth daily      diclofenac sodium (VOLTAREN) 1 % GEL Apply topically as needed      metFORMIN (GLUCOPHAGE-XR) 500 MG extended release tablet Take 2 tablets by mouth daily       No current facility-administered medications for this visit.

## 2024-06-04 ENCOUNTER — OFFICE VISIT (OUTPATIENT)
Age: 69
End: 2024-06-04

## 2024-06-04 DIAGNOSIS — E66.01 CLASS 2 SEVERE OBESITY DUE TO EXCESS CALORIES WITH SERIOUS COMORBIDITY AND BODY MASS INDEX (BMI) OF 39.0 TO 39.9 IN ADULT (HCC): Primary | ICD-10-CM

## 2024-06-05 ENCOUNTER — OFFICE VISIT (OUTPATIENT)
Age: 69
End: 2024-06-05

## 2024-06-05 DIAGNOSIS — E66.01 CLASS 2 SEVERE OBESITY DUE TO EXCESS CALORIES WITH SERIOUS COMORBIDITY AND BODY MASS INDEX (BMI) OF 39.0 TO 39.9 IN ADULT (HCC): Primary | ICD-10-CM

## 2024-06-05 NOTE — PROGRESS NOTES
[facility]  Metabolic Weight Loss Program        Patient's Name: Billie Nelson  : 1955    This patient is a participant at [facility] and attended the weekly virtual nutrition class hosted via [employer].      Tiffany Fox, MS, RD, LDN

## 2024-06-05 NOTE — PROGRESS NOTES
[facility]  Metabolic Program Follow-up Nutrition Consult    Date: 2024   Physician: Ellyn Alford MD  Name: Billie Nelson  :  1955    Type of Plan: LCD  Weeks on Plan: 4 months  Virtual visit was completed through Zoom.    ASSESSMENT:    Medications/Supplements:   Prior to Admission medications    Medication Sig Start Date End Date Taking? Authorizing Provider   cetirizine (ZYRTEC) 10 MG tablet Take 1 tablet by mouth daily    Obdulio Alarcon MD   guaiFENesin (MUCINEX) 600 MG extended release tablet Take 2 tablets by mouth in the morning and 2 tablets in the evening.    Obdulio Alarcon MD   amoxicillin (AMOXIL) 500 MG capsule TAKE 4 CAPSULES PRIOR TO PROCEDURE. 24   Sylvia Agustin PA-C   NONFORMULARY Inject as directed Cortisone injection in left knee every 3 months    Obdulio Alarcon MD   vitamin C (ASCORBIC ACID) 500 MG tablet Take 1 tablet by mouth as needed    Obdulio Alarcon MD   valACYclovir (VALTREX) 500 MG tablet Take 1 tablet by mouth daily 23   Obdulio Alarcon MD   topiramate (TOPAMAX) 25 MG tablet Take 1 tablet by mouth Daily with supper 24   Ellyn Alford MD   ZINC PO Take 25 mg by mouth daily    Obdulio Alarcon MD   pravastatin (PRAVACHOL) 20 MG tablet Take 1 tablet by mouth daily 23   Obdulio Alarcon MD   meloxicam (MOBIC) 15 MG tablet Take 1 tablet by mouth daily  Patient taking differently: Take 1 tablet by mouth as needed 23   Sylvia Agustin PA-C   Multiple Vitamin (MULTIVITAMIN PO) Take 1 tablet by mouth daily    Automatic Reconciliation, Ar   diclofenac sodium (VOLTAREN) 1 % GEL Apply topically as needed    Automatic Reconciliation, Ar   metFORMIN (GLUCOPHAGE-XR) 500 MG extended release tablet Take 2 tablets by mouth daily 3/20/21   Automatic Reconciliation, Ar              Starting Weight: 246#  Current Weight: 212# (216# last visit one month ago)  Overall Pounds Lost:

## 2024-06-11 ENCOUNTER — OFFICE VISIT (OUTPATIENT)
Age: 69
End: 2024-06-11

## 2024-06-11 DIAGNOSIS — E66.01 CLASS 2 SEVERE OBESITY DUE TO EXCESS CALORIES WITH SERIOUS COMORBIDITY AND BODY MASS INDEX (BMI) OF 39.0 TO 39.9 IN ADULT (HCC): Primary | ICD-10-CM

## 2024-06-12 NOTE — PROGRESS NOTES
[facility]  Metabolic Weight Loss Program        Patient's Name: Billie Nelson  : 1955    This patient is a participant at [facility] and attended the weekly virtual nutrition class hosted via [employer].      Tiffany Fox, MS, RD, LDN

## 2024-06-20 ENCOUNTER — OFFICE VISIT (OUTPATIENT)
Age: 69
End: 2024-06-20

## 2024-06-20 DIAGNOSIS — E66.01 CLASS 2 SEVERE OBESITY DUE TO EXCESS CALORIES WITH SERIOUS COMORBIDITY AND BODY MASS INDEX (BMI) OF 39.0 TO 39.9 IN ADULT (HCC): Primary | ICD-10-CM

## 2024-06-21 LAB
ALBUMIN SERPL-MCNC: 4.2 G/DL (ref 3.9–4.9)
ALP SERPL-CCNC: 106 IU/L (ref 44–121)
ALT SERPL-CCNC: 21 IU/L (ref 0–32)
AST SERPL-CCNC: 25 IU/L (ref 0–40)
BILIRUB SERPL-MCNC: 0.4 MG/DL (ref 0–1.2)
BUN SERPL-MCNC: 21 MG/DL (ref 8–27)
BUN/CREAT SERPL: 22 (ref 12–28)
CALCIUM SERPL-MCNC: 10.2 MG/DL (ref 8.7–10.3)
CHLORIDE SERPL-SCNC: 107 MMOL/L (ref 96–106)
CO2 SERPL-SCNC: 24 MMOL/L (ref 20–29)
CREAT SERPL-MCNC: 0.96 MG/DL (ref 0.57–1)
EGFRCR SERPLBLD CKD-EPI 2021: 64 ML/MIN/1.73
GLOBULIN SER CALC-MCNC: 2.5 G/DL (ref 1.5–4.5)
GLUCOSE SERPL-MCNC: 78 MG/DL (ref 70–99)
POTASSIUM SERPL-SCNC: 4.7 MMOL/L (ref 3.5–5.2)
PROT SERPL-MCNC: 6.7 G/DL (ref 6–8.5)
SODIUM SERPL-SCNC: 144 MMOL/L (ref 134–144)

## 2024-06-24 NOTE — PROGRESS NOTES
[facility]  Metabolic Weight Loss Program        Patient's Name: Billie Nelson  : 1955    This patient is a participant at [facility] and attended the weekly virtual nutrition class hosted via [employer].      Tiffany Fox, MS, RD, LDN

## 2024-06-27 ENCOUNTER — PATIENT MESSAGE (OUTPATIENT)
Age: 69
End: 2024-06-27

## 2024-06-27 ENCOUNTER — OFFICE VISIT (OUTPATIENT)
Age: 69
End: 2024-06-27

## 2024-06-27 ENCOUNTER — OFFICE VISIT (OUTPATIENT)
Age: 69
End: 2024-06-27
Payer: MEDICARE

## 2024-06-27 VITALS
RESPIRATION RATE: 18 BRPM | HEIGHT: 64 IN | BODY MASS INDEX: 35.77 KG/M2 | TEMPERATURE: 98.2 F | OXYGEN SATURATION: 98 % | HEART RATE: 55 BPM | DIASTOLIC BLOOD PRESSURE: 81 MMHG | SYSTOLIC BLOOD PRESSURE: 128 MMHG | WEIGHT: 209.5 LBS

## 2024-06-27 DIAGNOSIS — E66.01 CLASS 2 SEVERE OBESITY DUE TO EXCESS CALORIES WITH SERIOUS COMORBIDITY AND BODY MASS INDEX (BMI) OF 35.0 TO 35.9 IN ADULT (HCC): Primary | ICD-10-CM

## 2024-06-27 DIAGNOSIS — E78.2 MIXED HYPERLIPIDEMIA: ICD-10-CM

## 2024-06-27 DIAGNOSIS — G47.09 OTHER INSOMNIA: ICD-10-CM

## 2024-06-27 DIAGNOSIS — E11.9 TYPE 2 DIABETES MELLITUS WITHOUT COMPLICATION, WITHOUT LONG-TERM CURRENT USE OF INSULIN (HCC): ICD-10-CM

## 2024-06-27 PROCEDURE — 2022F DILAT RTA XM EVC RTNOPTHY: CPT | Performed by: FAMILY MEDICINE

## 2024-06-27 PROCEDURE — G8427 DOCREV CUR MEDS BY ELIG CLIN: HCPCS | Performed by: FAMILY MEDICINE

## 2024-06-27 PROCEDURE — 1123F ACP DISCUSS/DSCN MKR DOCD: CPT | Performed by: FAMILY MEDICINE

## 2024-06-27 PROCEDURE — G8399 PT W/DXA RESULTS DOCUMENT: HCPCS | Performed by: FAMILY MEDICINE

## 2024-06-27 PROCEDURE — 3017F COLORECTAL CA SCREEN DOC REV: CPT | Performed by: FAMILY MEDICINE

## 2024-06-27 PROCEDURE — 99214 OFFICE O/P EST MOD 30 MIN: CPT | Performed by: FAMILY MEDICINE

## 2024-06-27 PROCEDURE — 1036F TOBACCO NON-USER: CPT | Performed by: FAMILY MEDICINE

## 2024-06-27 PROCEDURE — 1090F PRES/ABSN URINE INCON ASSESS: CPT | Performed by: FAMILY MEDICINE

## 2024-06-27 PROCEDURE — 3044F HG A1C LEVEL LT 7.0%: CPT | Performed by: FAMILY MEDICINE

## 2024-06-27 PROCEDURE — G8417 CALC BMI ABV UP PARAM F/U: HCPCS | Performed by: FAMILY MEDICINE

## 2024-06-27 ASSESSMENT — PATIENT HEALTH QUESTIONNAIRE - PHQ9
SUM OF ALL RESPONSES TO PHQ QUESTIONS 1-9: 0
SUM OF ALL RESPONSES TO PHQ9 QUESTIONS 1 & 2: 0
1. LITTLE INTEREST OR PLEASURE IN DOING THINGS: NOT AT ALL
SUM OF ALL RESPONSES TO PHQ QUESTIONS 1-9: 0
2. FEELING DOWN, DEPRESSED OR HOPELESS: NOT AT ALL

## 2024-06-27 NOTE — PROGRESS NOTES
[facility] Progress Note:   F/up Physician Visit    CC: Weight Management      Billie Nelson is a 68 y.o. female who is here for her  f/up physician visit for the VLCD / LCD Program.  May 214  Now 209 6/27/2024     9:04 AM 6/27/2024     9:00 AM 6/3/2024     9:07 AM 5/21/2024     9:04 AM 5/21/2024     9:00 AM 5/13/2024     8:43 AM 4/29/2024     8:36 AM   Weight Metrics   Weight 209 lb 8 oz  212 lb 14.4 oz 214 lb 4.8 oz  215 lb 3.2 oz 216 lb 8 oz   Neck (Inches)  14.25 in   14.75 in     Waist Measure Inches  40 in   46 in     Body Fat %  44.4 %   44.9 %     BMI (Calculated) 36 kg/m2  36.6 kg/m2 36.9 kg/m2  37 kg/m2 37.2 kg/m2          No data to display                   Current Outpatient Medications   Medication Sig Dispense Refill    cetirizine (ZYRTEC) 10 MG tablet Take 1 tablet by mouth daily      guaiFENesin (MUCINEX) 600 MG extended release tablet Take 2 tablets by mouth in the morning and 2 tablets in the evening.      amoxicillin (AMOXIL) 500 MG capsule TAKE 4 CAPSULES PRIOR TO PROCEDURE. 4 capsule 4    NONFORMULARY Inject as directed Cortisone injection in left knee every 3 months      vitamin C (ASCORBIC ACID) 500 MG tablet Take 1 tablet by mouth as needed      valACYclovir (VALTREX) 500 MG tablet Take 1 tablet by mouth daily      topiramate (TOPAMAX) 25 MG tablet Take 1 tablet by mouth Daily with supper 60 tablet 2    ZINC PO Take 25 mg by mouth daily      pravastatin (PRAVACHOL) 20 MG tablet Take 1 tablet by mouth daily      meloxicam (MOBIC) 15 MG tablet Take 1 tablet by mouth daily (Patient taking differently: Take 1 tablet by mouth as needed) 30 tablet 3    Multiple Vitamin (MULTIVITAMIN PO) Take 1 tablet by mouth daily      diclofenac sodium (VOLTAREN) 1 % GEL Apply topically as needed      metFORMIN (GLUCOPHAGE-XR) 500 MG extended release tablet Take 2 tablets by mouth daily       No current facility-administered medications for this visit.          Participation

## 2024-06-27 NOTE — PROGRESS NOTES
Identified pt with two pt identifiers (name and ). Reviewed chart in preparation for visit and have obtained necessary documentation.    Billie Nelson is a 68 y.o. female  Chief Complaint   Patient presents with    Weight Management     1 month follow up     /81 (Site: Right Upper Arm, Position: Sitting, Cuff Size: Small Adult)   Pulse 55   Temp 98.2 °F (36.8 °C) (Oral)   Resp 18   Ht 1.626 m (5' 4\")   Wt 95 kg (209 lb 8 oz)   SpO2 98%   BMI 35.96 kg/m²     1. Have you been to the ER, urgent care clinic since your last visit?  Hospitalized since your last visit?no    2. Have you seen or consulted any other health care providers outside of the [facility] since your last visit?  Include any pap smears or colon screening. No    BMI - 35.8

## 2024-07-09 ENCOUNTER — OFFICE VISIT (OUTPATIENT)
Age: 69
End: 2024-07-09

## 2024-07-09 DIAGNOSIS — E66.01 CLASS 2 SEVERE OBESITY DUE TO EXCESS CALORIES WITH SERIOUS COMORBIDITY AND BODY MASS INDEX (BMI) OF 35.0 TO 35.9 IN ADULT (HCC): Primary | ICD-10-CM

## 2024-07-09 NOTE — PROGRESS NOTES
[facility]  Metabolic Weight Loss Program        Patient's Name: Billie Nelson  : 1955    This patient is a participant at [facility] and attended the weekly virtual nutrition class hosted via [employer].      Tiffany Fox, MS, RD, LDN

## 2024-07-15 ENCOUNTER — NURSE ONLY (OUTPATIENT)
Age: 69
End: 2024-07-15

## 2024-07-15 VITALS
DIASTOLIC BLOOD PRESSURE: 80 MMHG | WEIGHT: 210.3 LBS | OXYGEN SATURATION: 99 % | RESPIRATION RATE: 18 BRPM | SYSTOLIC BLOOD PRESSURE: 146 MMHG | HEIGHT: 64 IN | TEMPERATURE: 98.3 F | BODY MASS INDEX: 35.9 KG/M2 | HEART RATE: 53 BPM

## 2024-07-15 DIAGNOSIS — E11.9 TYPE 2 DIABETES MELLITUS WITHOUT COMPLICATION, WITHOUT LONG-TERM CURRENT USE OF INSULIN (HCC): ICD-10-CM

## 2024-07-15 DIAGNOSIS — E78.2 MIXED HYPERLIPIDEMIA: ICD-10-CM

## 2024-07-15 DIAGNOSIS — G47.09 OTHER INSOMNIA: ICD-10-CM

## 2024-07-15 DIAGNOSIS — E66.01 CLASS 2 SEVERE OBESITY DUE TO EXCESS CALORIES WITH SERIOUS COMORBIDITY AND BODY MASS INDEX (BMI) OF 35.0 TO 35.9 IN ADULT (HCC): Primary | ICD-10-CM

## 2024-07-15 RX ORDER — HYDROXYZINE HYDROCHLORIDE 25 MG/1
25 TABLET, FILM COATED ORAL NIGHTLY
COMMUNITY
Start: 2024-06-30

## 2024-07-15 NOTE — PROGRESS NOTES
7/1/2024    Progress Note: Weekly Education Class in the [facility] Weight [facility]         Patient is on Very Low Calorie Diet [] (4 meal replacements per day, 800 kcal/day)      Low Calorie Diet [x] (2-3 meal replacements per day, 6375-6193 kcal/day)    1) Did patient have any new symptoms or physical problems?   Yes []    No []    If yes, check & comment: weakness [], fatigue [], lightheadedness [], headache [], cramps [], cold intolerance [], hair loss [], diarrhea [x], constipation [],  NA [] other:                                 2) Has patient had any medical attention from other providers, urgent care or the emergency room this week?  Yes []  No [x]       NA [], If yes, why:                                       3) Any other sugar sweetened beverages consumed this week?   Yes []  No [x]    4) Did patient have any problems adhering to the diet? Yes []  No [] NA []    If yes, Vacation [], Celebrations [], Conferences [], Family Reunions [] other: family visitors from out of town                                               5) How many hours of sleep this week? NOT ANSWERED      (range)  NA []    Number of meal replacements consumed daily? 1-2 (range)  NA []    Average ounces of water patient consumed daily this week (not including shakes)? 52     (divide the weekly total by 7)    Did you eat any food outside of the program? Yes [x] No []    Physical Activity Over the Past Week:    Cardio exercise: 0 min  Strength exercise: 0 workouts / week  Number of steps walked per day: 0    How has patient mood overall been this week? Sad [], Happy [], Stressed [], Tired [], Content [], NA [], other NOT ANSWERED             Medications reconciled by nurse Yes [x]  No[]    Patient was given therapeutic recommendations for any noted side effects of their dietary approach based upon [facility] patient manual per providers recommendation.     7/8/2024    Progress Note: Weekly Education Class in the [facility]

## 2024-07-15 NOTE — PROGRESS NOTES
Identified pt with two pt identifiers (name and ). Reviewed chart in preparation for visit and have obtained necessary documentation.    Billie Nelson is a 68 y.o. female  Chief Complaint   Patient presents with    Weight Management     BP (!) 146/80 (Site: Right Upper Arm, Position: Sitting, Cuff Size: Large Adult)   Pulse 53   Temp 98.3 °F (36.8 °C) (Oral)   Resp 18   Ht 1.626 m (5' 4\")   Wt 95.4 kg (210 lb 4.8 oz)   SpO2 99%   BMI 36.10 kg/m²     1. Have you been to the ER, urgent care clinic since your last visit?  Hospitalized since your last visit?no    2. Have you seen or consulted any other health care providers outside of the [facility] since your last visit?  Include any pap smears or colon screening. yes - Orhtopaedic Surgeon for 3 month follow up    Patient and provider made aware of elevated BP x2. Patient asymptomatic. Patient reminded to monitor BP, continue to take BP medications if prescribed, and follow up with PCP/Cardiologist.  Patient expressed understanding and agreement.

## 2024-07-16 ENCOUNTER — OFFICE VISIT (OUTPATIENT)
Age: 69
End: 2024-07-16

## 2024-07-16 DIAGNOSIS — E66.01 CLASS 2 SEVERE OBESITY DUE TO EXCESS CALORIES WITH SERIOUS COMORBIDITY AND BODY MASS INDEX (BMI) OF 35.0 TO 35.9 IN ADULT (HCC): Primary | ICD-10-CM

## 2024-07-17 NOTE — PROGRESS NOTES
[facility]  Metabolic Weight Loss Program        Patient's Name: Billie Nelson  : 1955    This patient is a participant at [facility] and attended the weekly virtual nutrition class hosted via [employer].      Tiffany Fox, MS, RD, LDN

## 2024-08-01 ENCOUNTER — OFFICE VISIT (OUTPATIENT)
Age: 69
End: 2024-08-01
Payer: MEDICARE

## 2024-08-01 VITALS
SYSTOLIC BLOOD PRESSURE: 128 MMHG | DIASTOLIC BLOOD PRESSURE: 76 MMHG | HEART RATE: 53 BPM | HEIGHT: 64 IN | WEIGHT: 206.8 LBS | BODY MASS INDEX: 35.3 KG/M2 | RESPIRATION RATE: 18 BRPM | TEMPERATURE: 98.3 F | OXYGEN SATURATION: 98 %

## 2024-08-01 DIAGNOSIS — E11.9 TYPE 2 DIABETES MELLITUS WITHOUT COMPLICATION, WITHOUT LONG-TERM CURRENT USE OF INSULIN (HCC): ICD-10-CM

## 2024-08-01 DIAGNOSIS — E66.01 CLASS 2 SEVERE OBESITY DUE TO EXCESS CALORIES WITH SERIOUS COMORBIDITY AND BODY MASS INDEX (BMI) OF 35.0 TO 35.9 IN ADULT (HCC): Primary | ICD-10-CM

## 2024-08-01 DIAGNOSIS — E78.2 MIXED HYPERLIPIDEMIA: ICD-10-CM

## 2024-08-01 PROCEDURE — 3017F COLORECTAL CA SCREEN DOC REV: CPT | Performed by: FAMILY MEDICINE

## 2024-08-01 PROCEDURE — 2022F DILAT RTA XM EVC RTNOPTHY: CPT | Performed by: FAMILY MEDICINE

## 2024-08-01 PROCEDURE — 1090F PRES/ABSN URINE INCON ASSESS: CPT | Performed by: FAMILY MEDICINE

## 2024-08-01 PROCEDURE — G8417 CALC BMI ABV UP PARAM F/U: HCPCS | Performed by: FAMILY MEDICINE

## 2024-08-01 PROCEDURE — 1123F ACP DISCUSS/DSCN MKR DOCD: CPT | Performed by: FAMILY MEDICINE

## 2024-08-01 PROCEDURE — G8399 PT W/DXA RESULTS DOCUMENT: HCPCS | Performed by: FAMILY MEDICINE

## 2024-08-01 PROCEDURE — 99214 OFFICE O/P EST MOD 30 MIN: CPT | Performed by: FAMILY MEDICINE

## 2024-08-01 PROCEDURE — 3044F HG A1C LEVEL LT 7.0%: CPT | Performed by: FAMILY MEDICINE

## 2024-08-01 PROCEDURE — 1036F TOBACCO NON-USER: CPT | Performed by: FAMILY MEDICINE

## 2024-08-01 PROCEDURE — G8427 DOCREV CUR MEDS BY ELIG CLIN: HCPCS | Performed by: FAMILY MEDICINE

## 2024-08-01 ASSESSMENT — PATIENT HEALTH QUESTIONNAIRE - PHQ9
SUM OF ALL RESPONSES TO PHQ9 QUESTIONS 1 & 2: 0
SUM OF ALL RESPONSES TO PHQ QUESTIONS 1-9: 0
2. FEELING DOWN, DEPRESSED OR HOPELESS: NOT AT ALL
SUM OF ALL RESPONSES TO PHQ QUESTIONS 1-9: 0
SUM OF ALL RESPONSES TO PHQ QUESTIONS 1-9: 0
1. LITTLE INTEREST OR PLEASURE IN DOING THINGS: NOT AT ALL
SUM OF ALL RESPONSES TO PHQ QUESTIONS 1-9: 0

## 2024-08-01 NOTE — PROGRESS NOTES
Nurse note from patient's weekly LCD / Maintenance class was reviewed.  Pertinent medical concerns were:   reviewed     BP Readings from Last 3 Encounters:   08/01/24 128/76   07/15/24 (!) 146/80   06/27/24 128/81       Failed to redirect to the Timeline version of the [facility] SmartLink.    Current Outpatient Medications   Medication Sig Dispense Refill    hydrOXYzine HCl (ATARAX) 25 MG tablet Take 1 tablet by mouth nightly      cetirizine (ZYRTEC) 10 MG tablet Take 1 tablet by mouth daily      amoxicillin (AMOXIL) 500 MG capsule TAKE 4 CAPSULES PRIOR TO PROCEDURE. 4 capsule 4    NONFORMULARY Inject as directed Cortisone injection in left knee every 3 months      vitamin C (ASCORBIC ACID) 500 MG tablet Take 1 tablet by mouth as needed      valACYclovir (VALTREX) 500 MG tablet Take 1 tablet by mouth daily      ZINC PO Take 25 mg by mouth daily      pravastatin (PRAVACHOL) 20 MG tablet Take 1 tablet by mouth daily      meloxicam (MOBIC) 15 MG tablet Take 1 tablet by mouth daily (Patient taking differently: Take 1 tablet by mouth as needed) 30 tablet 3    Multiple Vitamin (MULTIVITAMIN PO) Take 1 tablet by mouth daily      diclofenac sodium (VOLTAREN) 1 % GEL Apply topically as needed      metFORMIN (GLUCOPHAGE-XR) 500 MG extended release tablet Take 2 tablets by mouth daily       No current facility-administered medications for this visit.

## 2024-08-01 NOTE — PROGRESS NOTES
Identified pt with two pt identifiers (name and ). Reviewed chart in preparation for visit and have obtained necessary documentation.    Billie Nelson is a 68 y.o. female  Chief Complaint   Patient presents with    Weight Management     1 month follow up     /76 (Site: Right Upper Arm, Position: Sitting, Cuff Size: Large Adult)   Pulse 53   Temp 98.3 °F (36.8 °C) (Oral)   Resp 18   Ht 1.626 m (5' 4\")   Wt 93.8 kg (206 lb 12.8 oz)   SpO2 98%   BMI 35.50 kg/m²     1. Have you been to the ER, urgent care clinic since your last visit?  Hospitalized since your last visit?no    2. Have you seen or consulted any other health care providers outside of the [facility] since your last visit?  Include any pap smears or colon screening. yes - Ortho doc    BMI - 35.4    Patient and provider made aware of elevated BP x2. Patient asymptomatic. Patient reminded to monitor BP, continue to take BP medications if prescribed, and follow up with PCP/Cardiologist.  Patient expressed understanding and agreement.    
yes, please list:       Are you taking an appetite suppressant? no  If so, is there any Chest Pain, Palpitations or Dizziness?      HUNGER CONTROL: the last 2 weeks has been stressful due to death in family    BP Readings from Last 3 Encounters:   08/01/24 128/76   07/15/24 (!) 146/80   06/27/24 128/81       SLEEP:sleep is never good, has had 2 sleep studies and are not conclusive    Have you received any other medical care this week? no  If yes, where and for what?       Have you discontinued or changed any medicine or dose of your medicine since your last visit with Dr Alford? no  If yes, where and for what?         Diet  How many ounces of calorie-free liquids did you consume each day?  60-64 oz    How many meal replacements did you take each day? 2 and a meal    Did you have any problems adhering to the program?  no If yes, please explain:      Exercise  Aerobic exercise: now in PREP program, has not fully started min  Resistance exercise: 0 workouts / week  Any discomfort?  no     If yes, where?    LABS:   June labs        Objective  /76 (Site: Right Upper Arm, Position: Sitting, Cuff Size: Large Adult)   Pulse 53   Temp 98.3 °F (36.8 °C) (Oral)   Resp 18   Ht 1.626 m (5' 4\")   Wt 93.8 kg (206 lb 12.8 oz)   SpO2 98%   BMI 35.50 kg/m²   No LMP recorded. Patient is postmenopausal.      Physical Exam  Appearance: well,   Mental:A&O x 3, NAD  H:NC/AT,  EENT:   EOMI, PERRL, No scleral icterus  Neck: no bruit or JVD  CV: RRR no M/R/G  Lung: clear, No W/R  ABD: soft, active, nontender  Ext:  no Edema  Neuro: nonfocal          Assessment / Plan    Billie Nelson was seen today for Weight Management (1 month follow up)       1. Class 2 severe obesity due to excess calories with serious comorbidity and body mass index (BMI) of 35.0 to 35.9 in adult (HCC)  Movement; starting the ACAC   PREP program  Meds: topamax 25 mg with dinner  Eating plan  Continue the low adriana eating plan using 2 robard meal

## 2024-08-07 ENCOUNTER — OFFICE VISIT (OUTPATIENT)
Age: 69
End: 2024-08-07

## 2024-08-07 DIAGNOSIS — E66.01 CLASS 2 SEVERE OBESITY DUE TO EXCESS CALORIES WITH SERIOUS COMORBIDITY AND BODY MASS INDEX (BMI) OF 35.0 TO 35.9 IN ADULT (HCC): Primary | ICD-10-CM

## 2024-08-09 NOTE — PROGRESS NOTES
[facility]  Metabolic Weight Loss Program        Patient's Name: Billie Nelson  : 1955    This patient is a participant at [facility] and attended the weekly virtual nutrition class hosted via [employer].      Tiffany Fox, MS, RD, LDN

## 2024-08-16 ENCOUNTER — NURSE ONLY (OUTPATIENT)
Age: 69
End: 2024-08-16

## 2024-08-16 VITALS
HEIGHT: 64 IN | OXYGEN SATURATION: 98 % | SYSTOLIC BLOOD PRESSURE: 120 MMHG | DIASTOLIC BLOOD PRESSURE: 78 MMHG | WEIGHT: 206.1 LBS | RESPIRATION RATE: 12 BRPM | BODY MASS INDEX: 35.19 KG/M2 | HEART RATE: 60 BPM | TEMPERATURE: 98.4 F

## 2024-08-16 DIAGNOSIS — E11.9 TYPE 2 DIABETES MELLITUS WITHOUT COMPLICATION, WITHOUT LONG-TERM CURRENT USE OF INSULIN (HCC): ICD-10-CM

## 2024-08-16 DIAGNOSIS — E66.09 CLASS 1 OBESITY DUE TO EXCESS CALORIES WITH SERIOUS COMORBIDITY AND BODY MASS INDEX (BMI) OF 33.0 TO 33.9 IN ADULT: Primary | ICD-10-CM

## 2024-08-16 DIAGNOSIS — E78.2 MIXED HYPERLIPIDEMIA: ICD-10-CM

## 2024-08-16 ASSESSMENT — PATIENT HEALTH QUESTIONNAIRE - PHQ9
1. LITTLE INTEREST OR PLEASURE IN DOING THINGS: NOT AT ALL
SUM OF ALL RESPONSES TO PHQ QUESTIONS 1-9: 0
SUM OF ALL RESPONSES TO PHQ9 QUESTIONS 1 & 2: 0
SUM OF ALL RESPONSES TO PHQ QUESTIONS 1-9: 0
2. FEELING DOWN, DEPRESSED OR HOPELESS: NOT AT ALL
SUM OF ALL RESPONSES TO PHQ QUESTIONS 1-9: 0
SUM OF ALL RESPONSES TO PHQ QUESTIONS 1-9: 0

## 2024-08-16 NOTE — PROGRESS NOTES
Identified patient with two patient identifiers (name and ). Reviewed chart in preparation for visit and have obtained necessary documentation.    Billie Nelson is a 68 y.o. female  Chief Complaint   Patient presents with    Weight Management     Triage/saray/lobito     /78 (Site: Right Upper Arm, Position: Sitting, Cuff Size: Large Adult)   Pulse 60   Temp 98.4 °F (36.9 °C) (Oral)   Resp 12   Ht 1.626 m (5' 4\")   Wt 93.5 kg (206 lb 1.6 oz)   SpO2 98%   BMI 35.38 kg/m²     1. Have you been to the ER, urgent care clinic since your last visit?  Hospitalized since your last visit?no    2. Have you seen or consulted any other health care providers outside of the [facility] since your last visit?  Include any pap smears or colon screening. yes - pt had dbl root canal last week and consulted with dermatology    Patient present for triage, but did not bring homework form. Patient instructed to e-mail or fax completed homework form to office. Patient informed that not bringing the homework form could result in not being seen next time.

## 2024-08-20 ENCOUNTER — OFFICE VISIT (OUTPATIENT)
Age: 69
End: 2024-08-20

## 2024-08-20 DIAGNOSIS — E66.01 CLASS 2 SEVERE OBESITY DUE TO EXCESS CALORIES WITH SERIOUS COMORBIDITY AND BODY MASS INDEX (BMI) OF 35.0 TO 35.9 IN ADULT (HCC): Primary | ICD-10-CM

## 2024-08-20 NOTE — PROGRESS NOTES
[facility]  Metabolic Weight Loss Program        Patient's Name: Billie Nelson  : 1955    This patient is a participant at [facility] and attended the weekly virtual nutrition class hosted via [employer].      Tiffany Fox, MS, RD, LDN

## 2024-08-26 SDOH — ECONOMIC STABILITY: FOOD INSECURITY: WITHIN THE PAST 12 MONTHS, THE FOOD YOU BOUGHT JUST DIDN'T LAST AND YOU DIDN'T HAVE MONEY TO GET MORE.: NEVER TRUE

## 2024-08-26 SDOH — ECONOMIC STABILITY: FOOD INSECURITY: WITHIN THE PAST 12 MONTHS, YOU WORRIED THAT YOUR FOOD WOULD RUN OUT BEFORE YOU GOT MONEY TO BUY MORE.: NEVER TRUE

## 2024-08-26 SDOH — ECONOMIC STABILITY: INCOME INSECURITY: HOW HARD IS IT FOR YOU TO PAY FOR THE VERY BASICS LIKE FOOD, HOUSING, MEDICAL CARE, AND HEATING?: NOT HARD AT ALL

## 2024-08-26 SDOH — ECONOMIC STABILITY: TRANSPORTATION INSECURITY
IN THE PAST 12 MONTHS, HAS LACK OF TRANSPORTATION KEPT YOU FROM MEETINGS, WORK, OR FROM GETTING THINGS NEEDED FOR DAILY LIVING?: NO

## 2024-08-27 ENCOUNTER — OFFICE VISIT (OUTPATIENT)
Age: 69
End: 2024-08-27
Payer: MEDICARE

## 2024-08-27 VITALS
HEIGHT: 66 IN | TEMPERATURE: 98.1 F | DIASTOLIC BLOOD PRESSURE: 80 MMHG | SYSTOLIC BLOOD PRESSURE: 132 MMHG | RESPIRATION RATE: 20 BRPM | BODY MASS INDEX: 32.92 KG/M2 | HEART RATE: 59 BPM | OXYGEN SATURATION: 99 % | WEIGHT: 204.8 LBS

## 2024-08-27 DIAGNOSIS — Z76.89 ENCOUNTER TO ESTABLISH CARE: Primary | ICD-10-CM

## 2024-08-27 DIAGNOSIS — E66.9 NON MORBID OBESITY: ICD-10-CM

## 2024-08-27 DIAGNOSIS — E55.9 HYPOVITAMINOSIS D: ICD-10-CM

## 2024-08-27 DIAGNOSIS — E78.00 HYPERCHOLESTEROLEMIA: ICD-10-CM

## 2024-08-27 DIAGNOSIS — Z79.899 ENCOUNTER FOR LONG-TERM (CURRENT) USE OF MEDICATIONS: ICD-10-CM

## 2024-08-27 DIAGNOSIS — M15.9 PRIMARY OSTEOARTHRITIS INVOLVING MULTIPLE JOINTS: ICD-10-CM

## 2024-08-27 DIAGNOSIS — R73.03 PREDIABETES: ICD-10-CM

## 2024-08-27 DIAGNOSIS — H10.12 ALLERGIC CONJUNCTIVITIS OF LEFT EYE: ICD-10-CM

## 2024-08-27 DIAGNOSIS — R41.3 GRADUAL-ONSET MEMORY IMPAIRMENT: ICD-10-CM

## 2024-08-27 LAB
25(OH)D3 SERPL-MCNC: 38.7 NG/ML (ref 30–100)
ALBUMIN SERPL-MCNC: 4.1 G/DL (ref 3.5–5)
ALBUMIN/GLOB SERPL: 1.1 (ref 1.1–2.2)
ALP SERPL-CCNC: 109 U/L (ref 45–117)
ALT SERPL-CCNC: 32 U/L (ref 12–78)
ANION GAP SERPL CALC-SCNC: 2 MMOL/L (ref 5–15)
APPEARANCE UR: CLEAR
AST SERPL-CCNC: 25 U/L (ref 15–37)
BACTERIA URNS QL MICRO: NEGATIVE /HPF
BILIRUB SERPL-MCNC: 0.5 MG/DL (ref 0.2–1)
BILIRUB UR QL: NEGATIVE
BUN SERPL-MCNC: 23 MG/DL (ref 6–20)
BUN/CREAT SERPL: 27 (ref 12–20)
CALCIUM SERPL-MCNC: 10.5 MG/DL (ref 8.5–10.1)
CHLORIDE SERPL-SCNC: 107 MMOL/L (ref 97–108)
CHOLEST SERPL-MCNC: 180 MG/DL
CO2 SERPL-SCNC: 31 MMOL/L (ref 21–32)
COLOR UR: ABNORMAL
CREAT SERPL-MCNC: 0.85 MG/DL (ref 0.55–1.02)
CREAT UR-MCNC: 96.4 MG/DL
EPITH CASTS URNS QL MICRO: ABNORMAL /LPF
ERYTHROCYTE [DISTWIDTH] IN BLOOD BY AUTOMATED COUNT: 15.1 % (ref 11.5–14.5)
FOLATE SERPL-MCNC: 36.6 NG/ML (ref 5–21)
GLOBULIN SER CALC-MCNC: 3.7 G/DL (ref 2–4)
GLUCOSE SERPL-MCNC: 81 MG/DL (ref 65–100)
GLUCOSE UR STRIP.AUTO-MCNC: NEGATIVE MG/DL
GLUCOSE, POC: 84 MG/DL
HBA1C MFR BLD: 5.3 %
HCT VFR BLD AUTO: 41.3 % (ref 35–47)
HDLC SERPL-MCNC: 95 MG/DL
HDLC SERPL: 1.9 (ref 0–5)
HGB BLD-MCNC: 13.5 G/DL (ref 11.5–16)
HGB UR QL STRIP: NEGATIVE
KETONES UR QL STRIP.AUTO: NEGATIVE MG/DL
LDLC SERPL CALC-MCNC: 74.8 MG/DL (ref 0–100)
LEUKOCYTE ESTERASE UR QL STRIP.AUTO: ABNORMAL
MCH RBC QN AUTO: 31.5 PG (ref 26–34)
MCHC RBC AUTO-ENTMCNC: 32.7 G/DL (ref 30–36.5)
MCV RBC AUTO: 96.5 FL (ref 80–99)
MICROALBUMIN UR-MCNC: 0.59 MG/DL
MICROALBUMIN/CREAT UR-RTO: 6 MG/G (ref 0–30)
NITRITE UR QL STRIP.AUTO: NEGATIVE
NRBC # BLD: 0 K/UL (ref 0–0.01)
NRBC BLD-RTO: 0 PER 100 WBC
PH UR STRIP: 7 (ref 5–8)
PLATELET # BLD AUTO: 313 K/UL (ref 150–400)
PMV BLD AUTO: 12.5 FL (ref 8.9–12.9)
POTASSIUM SERPL-SCNC: 4.3 MMOL/L (ref 3.5–5.1)
PROT SERPL-MCNC: 7.8 G/DL (ref 6.4–8.2)
PROT UR STRIP-MCNC: NEGATIVE MG/DL
RBC # BLD AUTO: 4.28 M/UL (ref 3.8–5.2)
RBC #/AREA URNS HPF: ABNORMAL /HPF (ref 0–5)
SODIUM SERPL-SCNC: 140 MMOL/L (ref 136–145)
SP GR UR REFRACTOMETRY: 1.02 (ref 1–1.03)
SPECIMEN HOLD: NORMAL
TRIGL SERPL-MCNC: 51 MG/DL
TSH SERPL DL<=0.05 MIU/L-ACNC: 1.8 UIU/ML (ref 0.36–3.74)
UROBILINOGEN UR QL STRIP.AUTO: 0.2 EU/DL (ref 0.2–1)
VIT B12 SERPL-MCNC: 1021 PG/ML (ref 193–986)
VLDLC SERPL CALC-MCNC: 10.2 MG/DL
WBC # BLD AUTO: 9.4 K/UL (ref 3.6–11)
WBC URNS QL MICRO: ABNORMAL /HPF (ref 0–4)

## 2024-08-27 PROCEDURE — 82962 GLUCOSE BLOOD TEST: CPT | Performed by: FAMILY MEDICINE

## 2024-08-27 PROCEDURE — G8417 CALC BMI ABV UP PARAM F/U: HCPCS | Performed by: FAMILY MEDICINE

## 2024-08-27 PROCEDURE — PBSHW AMB POC HEMOGLOBIN A1C: Performed by: FAMILY MEDICINE

## 2024-08-27 PROCEDURE — PBSHW AMB POC GLUCOSE BLOOD, BY GLUCOSE MONITORING DEVICE: Performed by: FAMILY MEDICINE

## 2024-08-27 PROCEDURE — 1123F ACP DISCUSS/DSCN MKR DOCD: CPT | Performed by: FAMILY MEDICINE

## 2024-08-27 PROCEDURE — 99204 OFFICE O/P NEW MOD 45 MIN: CPT | Performed by: FAMILY MEDICINE

## 2024-08-27 PROCEDURE — 1036F TOBACCO NON-USER: CPT | Performed by: FAMILY MEDICINE

## 2024-08-27 PROCEDURE — 1090F PRES/ABSN URINE INCON ASSESS: CPT | Performed by: FAMILY MEDICINE

## 2024-08-27 PROCEDURE — G8427 DOCREV CUR MEDS BY ELIG CLIN: HCPCS | Performed by: FAMILY MEDICINE

## 2024-08-27 PROCEDURE — G8399 PT W/DXA RESULTS DOCUMENT: HCPCS | Performed by: FAMILY MEDICINE

## 2024-08-27 PROCEDURE — 3017F COLORECTAL CA SCREEN DOC REV: CPT | Performed by: FAMILY MEDICINE

## 2024-08-27 PROCEDURE — 83036 HEMOGLOBIN GLYCOSYLATED A1C: CPT | Performed by: FAMILY MEDICINE

## 2024-08-27 RX ORDER — OLOPATADINE HYDROCHLORIDE 1 MG/ML
1 SOLUTION/ DROPS OPHTHALMIC 2 TIMES DAILY
Qty: 5 ML | Refills: 0
Start: 2024-08-27 | End: 2024-09-26

## 2024-08-27 RX ORDER — TOPIRAMATE 25 MG/1
25 TABLET, FILM COATED ORAL DAILY
COMMUNITY
Start: 2024-02-25

## 2024-08-27 SDOH — ECONOMIC STABILITY: FOOD INSECURITY: WITHIN THE PAST 12 MONTHS, THE FOOD YOU BOUGHT JUST DIDN'T LAST AND YOU DIDN'T HAVE MONEY TO GET MORE.: NEVER TRUE

## 2024-08-27 SDOH — ECONOMIC STABILITY: FOOD INSECURITY: WITHIN THE PAST 12 MONTHS, YOU WORRIED THAT YOUR FOOD WOULD RUN OUT BEFORE YOU GOT MONEY TO BUY MORE.: NEVER TRUE

## 2024-08-27 SDOH — ECONOMIC STABILITY: INCOME INSECURITY: HOW HARD IS IT FOR YOU TO PAY FOR THE VERY BASICS LIKE FOOD, HOUSING, MEDICAL CARE, AND HEATING?: NOT HARD AT ALL

## 2024-08-27 ASSESSMENT — PATIENT HEALTH QUESTIONNAIRE - PHQ9
SUM OF ALL RESPONSES TO PHQ QUESTIONS 1-9: 0
2. FEELING DOWN, DEPRESSED OR HOPELESS: NOT AT ALL
SUM OF ALL RESPONSES TO PHQ QUESTIONS 1-9: 0
SUM OF ALL RESPONSES TO PHQ QUESTIONS 1-9: 0
SUM OF ALL RESPONSES TO PHQ9 QUESTIONS 1 & 2: 0
1. LITTLE INTEREST OR PLEASURE IN DOING THINGS: NOT AT ALL
SUM OF ALL RESPONSES TO PHQ QUESTIONS 1-9: 0

## 2024-08-27 ASSESSMENT — ENCOUNTER SYMPTOMS
SHORTNESS OF BREATH: 0
RHINORRHEA: 0
CHEST TIGHTNESS: 0
BLOOD IN STOOL: 0
CONSTIPATION: 0
ABDOMINAL PAIN: 0
COUGH: 0

## 2024-08-27 ASSESSMENT — ANXIETY QUESTIONNAIRES
IF YOU CHECKED OFF ANY PROBLEMS ON THIS QUESTIONNAIRE, HOW DIFFICULT HAVE THESE PROBLEMS MADE IT FOR YOU TO DO YOUR WORK, TAKE CARE OF THINGS AT HOME, OR GET ALONG WITH OTHER PEOPLE: NOT DIFFICULT AT ALL
5. BEING SO RESTLESS THAT IT IS HARD TO SIT STILL: NOT AT ALL
4. TROUBLE RELAXING: NOT AT ALL
7. FEELING AFRAID AS IF SOMETHING AWFUL MIGHT HAPPEN: NOT AT ALL
6. BECOMING EASILY ANNOYED OR IRRITABLE: NOT AT ALL
GAD7 TOTAL SCORE: 0
2. NOT BEING ABLE TO STOP OR CONTROL WORRYING: NOT AT ALL
GAD7 TOTAL SCORE: 0
3. WORRYING TOO MUCH ABOUT DIFFERENT THINGS: NOT AT ALL
1. FEELING NERVOUS, ANXIOUS, OR ON EDGE: NOT AT ALL

## 2024-08-27 NOTE — PROGRESS NOTES
Subjective:      Patient ID: Billie Nelson is a 68 y.o. female.    HPI  Encounter to establish care with new provider.      She is on metformin to help with weight loss.  History of prediabetes.    Past history of htn but currently not on medication.  Has BP elevation usually only at doctor's visit.    Has been doing weight management thru [facility] program.  She is please with her progress and has loss about 40 pounds since starting on the program.    Hypercholesterolemia follow up:  Has been on medication over the past 2 to 3 years.  Tolerating pravastatin.  Compliant w/ meds, low fat, low cholesterol diet.  Exercising some.  No muscle nor abdominal pain, no skin discoloration.  Patient fasting today.  Osteoarthritis:  Patient has osteoarthritis.  Has had arthritis in the spine.  Has had hip and knee replacement surgery.     Symptoms onset: problem is longstanding.  Rheumatological ROS: stable, mild-to-moderate joint symptoms intermittently, reasonably well controlled by PRN meds.   Response to treatment plan: stable and intermittent.     Last mammogram in 2024 at [facility].    Colonoscopy: 2 to 3 years ago.  Hx polyps and mother dx with colon cancer at age 100.      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  Active her Jehovah's witness activities.  Lives alone.      Previous teacher and coached tennis.    .  Has 2 sons ages 37 and 32 both are well.     Has some concerns about her memory.  Does not thinks she remember like she should and frequently names and for example she forgot the name of a song that she had just sung at a .  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  She is busy with activities on her computer on a daily bases.  Sleep well.  Denies feeling depressed.      Past

## 2024-08-27 NOTE — PROGRESS NOTES
Chief Complaint   Patient presents with    New Patient         New patient to Dr. Morales.  She is Colette Willard's former  patient.  She states she is going to the weight loss center at [facility]  She has lost about 40 plus pounds.  She goes to them every 2 weeks to weight in and once a month to see a doctor.  She believes she may no longer be diabetic, have HTN or high cholesterol as her last labs, 2 months ago, at [facility] were \"normal\".  She has had 2 sleep studies, but both were inconclusive.    She sees OBGYN at [facility].    Cardiologist is Zeina Lazcano MD.  She has been seen there twice.  She was seen due to feeling  winded when walking one day.  Will fax for records.      \"Have you been to the ER, urgent care clinic since your last visit?  Hospitalized since your last visit?\"    NO    “Have you seen or consulted any other health care providers outside of [facility] since your last visit?”    NO    Have you had a mammogram?”   YES - Where: [facility]. Nurse/CMA to request most recent records if not in the chart    Mammogram done in January of this year.  It was done at [facility] [place].  Will fax for copies off mamogram        “Have you had a colorectal cancer screening such as a colonoscopy/FIT/Cologuard?    YES - Type: Colonoscopy - Where: 3 years ago   at the endoscopy center off of [facility].  Will send out for records.  Nurse/CMA to request most recent records if not in the chart     Patient would like another colonoscopy as her mother passed last year of colon cancer and she worries that she may be at risk.  She has also has a history of polyps    No colonoscopy on file  No cologuard on file  No FIT/FOBT on file   No flexible sigmoidoscopy on file         Click Here for Release of Records Request

## 2024-08-28 NOTE — PROGRESS NOTES
Nurse note from patient's weekly / LCD / Maintenance class was reviewed.  Pertinent medical concerns were:   reviewed     BP Readings from Last 3 Encounters:   08/27/24 132/80   08/16/24 120/78   08/01/24 128/76       Failed to redirect to the Timeline version of the [facility] SmartLink.    Current Outpatient Medications   Medication Sig Dispense Refill    hydrOXYzine HCl (ATARAX) 25 MG tablet Take 1 tablet by mouth as needed      cetirizine (ZYRTEC) 10 MG tablet Take 1 tablet by mouth daily      amoxicillin (AMOXIL) 500 MG capsule TAKE 4 CAPSULES PRIOR TO PROCEDURE. 4 capsule 4    NONFORMULARY Inject as directed Cortisone injection in left knee every 3 months      vitamin C (ASCORBIC ACID) 500 MG tablet Take 1 tablet by mouth as needed      valACYclovir (VALTREX) 500 MG tablet Take 1 tablet by mouth daily      ZINC PO Take 25 mg by mouth daily      pravastatin (PRAVACHOL) 20 MG tablet Take 1 tablet by mouth daily      Multiple Vitamin (MULTIVITAMIN PO) Take 1 tablet by mouth daily      metFORMIN (GLUCOPHAGE-XR) 500 MG extended release tablet Take 2 tablets by mouth daily      topiramate (TOPAMAX) 25 MG tablet Take 1 tablet by mouth daily Does not take on a regular basis      olopatadine (PATADAY) 0.1 % ophthalmic solution Place 1 drop into the left eye 2 times daily 5 mL 0     No current facility-administered medications for this visit.

## 2024-08-30 ENCOUNTER — NURSE ONLY (OUTPATIENT)
Age: 69
End: 2024-08-30

## 2024-08-30 VITALS
TEMPERATURE: 98.2 F | HEART RATE: 56 BPM | DIASTOLIC BLOOD PRESSURE: 83 MMHG | SYSTOLIC BLOOD PRESSURE: 130 MMHG | BODY MASS INDEX: 33.52 KG/M2 | HEIGHT: 66 IN | OXYGEN SATURATION: 99 % | WEIGHT: 208.6 LBS | RESPIRATION RATE: 16 BRPM

## 2024-08-30 DIAGNOSIS — E78.2 MIXED HYPERLIPIDEMIA: ICD-10-CM

## 2024-08-30 DIAGNOSIS — E11.9 TYPE 2 DIABETES MELLITUS WITHOUT COMPLICATION, WITHOUT LONG-TERM CURRENT USE OF INSULIN (HCC): ICD-10-CM

## 2024-08-30 DIAGNOSIS — E66.09 CLASS 1 OBESITY DUE TO EXCESS CALORIES WITH SERIOUS COMORBIDITY AND BODY MASS INDEX (BMI) OF 33.0 TO 33.9 IN ADULT: Primary | ICD-10-CM

## 2024-08-30 ASSESSMENT — PATIENT HEALTH QUESTIONNAIRE - PHQ9
2. FEELING DOWN, DEPRESSED OR HOPELESS: NOT AT ALL
SUM OF ALL RESPONSES TO PHQ QUESTIONS 1-9: 0
1. LITTLE INTEREST OR PLEASURE IN DOING THINGS: NOT AT ALL
SUM OF ALL RESPONSES TO PHQ9 QUESTIONS 1 & 2: 0
SUM OF ALL RESPONSES TO PHQ QUESTIONS 1-9: 0

## 2024-08-30 NOTE — PROGRESS NOTES
Identified patient with two patient identifiers (name and ). Reviewed chart in preparation for visit and have obtained necessary documentation.    Billie Nelson is a 68 y.o. female  Chief Complaint   Patient presents with    Weight Management     Triage/saray/lobito     /83 (Site: Right Upper Arm, Position: Sitting, Cuff Size: Large Adult)   Pulse 56   Temp 98.2 °F (36.8 °C) (Oral)   Resp 16   Ht 1.676 m (5' 6\")   Wt 94.6 kg (208 lb 9.6 oz)   SpO2 99%   BMI 33.67 kg/m²     1. Have you been to the ER, urgent care clinic since your last visit?  Hospitalized since your last visit?no    2. Have you seen or consulted any other health care providers outside of the [facility] since your last visit?  Include any pap smears or colon screening. No    Patient present for triage, but did not bring homework form. Patient instructed to e-mail or fax completed homework form to office. Patient informed that not bringing the homework form could result in not being seen next time.

## 2024-09-03 ENCOUNTER — OFFICE VISIT (OUTPATIENT)
Age: 69
End: 2024-09-03

## 2024-09-03 ENCOUNTER — HOSPITAL ENCOUNTER (OUTPATIENT)
Facility: HOSPITAL | Age: 69
Discharge: HOME | End: 2024-09-06
Attending: FAMILY MEDICINE
Payer: MEDICARE

## 2024-09-03 DIAGNOSIS — E66.09 CLASS 1 OBESITY DUE TO EXCESS CALORIES WITH SERIOUS COMORBIDITY AND BODY MASS INDEX (BMI) OF 33.0 TO 33.9 IN ADULT: Primary | ICD-10-CM

## 2024-09-03 DIAGNOSIS — R41.3 GRADUAL-ONSET MEMORY IMPAIRMENT: ICD-10-CM

## 2024-09-03 PROCEDURE — 70450 CT HEAD/BRAIN W/O DYE: CPT

## 2024-09-03 NOTE — PROGRESS NOTES
[facility]  Metabolic Weight Loss Program        Patient's Name: Billie Nelson  : 1955    This patient is a participant at [facility] and attended the weekly virtual nutrition class hosted via [employer].      Tiffany Fox, MS, RD, LDN

## 2024-09-03 NOTE — PROGRESS NOTES
Nurse note from patient's weekly VLCD / LCD / Maintenance class was reviewed.  Pertinent medical concerns were:   reviewed  No homework   BP Readings from Last 3 Encounters:   08/30/24 130/83   08/27/24 132/80   08/16/24 120/78       Failed to redirect to the Timeline version of the [facility] SmartLink.    Current Outpatient Medications   Medication Sig Dispense Refill    topiramate (TOPAMAX) 25 MG tablet Take 1 tablet by mouth daily Does not take on a regular basis      olopatadine (PATADAY) 0.1 % ophthalmic solution Place 1 drop into the left eye 2 times daily 5 mL 0    hydrOXYzine HCl (ATARAX) 25 MG tablet Take 1 tablet by mouth as needed      cetirizine (ZYRTEC) 10 MG tablet Take 1 tablet by mouth daily      amoxicillin (AMOXIL) 500 MG capsule TAKE 4 CAPSULES PRIOR TO PROCEDURE. 4 capsule 4    NONFORMULARY Inject as directed Cortisone injection in left knee every 3 months      vitamin C (ASCORBIC ACID) 500 MG tablet Take 1 tablet by mouth as needed      valACYclovir (VALTREX) 500 MG tablet Take 1 tablet by mouth daily      ZINC PO Take 25 mg by mouth daily      pravastatin (PRAVACHOL) 20 MG tablet Take 1 tablet by mouth daily      Multiple Vitamin (MULTIVITAMIN PO) Take 1 tablet by mouth daily      metFORMIN (GLUCOPHAGE-XR) 500 MG extended release tablet Take 2 tablets by mouth daily       No current facility-administered medications for this visit.

## 2024-09-10 ENCOUNTER — OFFICE VISIT (OUTPATIENT)
Age: 69
End: 2024-09-10

## 2024-09-10 DIAGNOSIS — E66.09 CLASS 1 OBESITY DUE TO EXCESS CALORIES WITH SERIOUS COMORBIDITY AND BODY MASS INDEX (BMI) OF 33.0 TO 33.9 IN ADULT: Primary | ICD-10-CM

## 2024-09-17 ENCOUNTER — OFFICE VISIT (OUTPATIENT)
Age: 69
End: 2024-09-17
Payer: MEDICARE

## 2024-09-17 VITALS
OXYGEN SATURATION: 98 % | WEIGHT: 203.5 LBS | SYSTOLIC BLOOD PRESSURE: 136 MMHG | HEART RATE: 52 BPM | DIASTOLIC BLOOD PRESSURE: 80 MMHG | HEIGHT: 66 IN | BODY MASS INDEX: 32.71 KG/M2 | RESPIRATION RATE: 18 BRPM | TEMPERATURE: 98.1 F

## 2024-09-17 DIAGNOSIS — E66.09 CLASS 1 OBESITY DUE TO EXCESS CALORIES WITH SERIOUS COMORBIDITY AND BODY MASS INDEX (BMI) OF 32.0 TO 32.9 IN ADULT: Primary | ICD-10-CM

## 2024-09-17 DIAGNOSIS — E11.9 TYPE 2 DIABETES MELLITUS WITHOUT COMPLICATION, WITHOUT LONG-TERM CURRENT USE OF INSULIN (HCC): ICD-10-CM

## 2024-09-17 DIAGNOSIS — E78.2 MIXED HYPERLIPIDEMIA: ICD-10-CM

## 2024-09-17 PROCEDURE — G8399 PT W/DXA RESULTS DOCUMENT: HCPCS | Performed by: FAMILY MEDICINE

## 2024-09-17 PROCEDURE — G8417 CALC BMI ABV UP PARAM F/U: HCPCS | Performed by: FAMILY MEDICINE

## 2024-09-17 PROCEDURE — 1123F ACP DISCUSS/DSCN MKR DOCD: CPT | Performed by: FAMILY MEDICINE

## 2024-09-17 PROCEDURE — 3044F HG A1C LEVEL LT 7.0%: CPT | Performed by: FAMILY MEDICINE

## 2024-09-17 PROCEDURE — 1090F PRES/ABSN URINE INCON ASSESS: CPT | Performed by: FAMILY MEDICINE

## 2024-09-17 PROCEDURE — 3017F COLORECTAL CA SCREEN DOC REV: CPT | Performed by: FAMILY MEDICINE

## 2024-09-17 PROCEDURE — 1036F TOBACCO NON-USER: CPT | Performed by: FAMILY MEDICINE

## 2024-09-17 PROCEDURE — 2022F DILAT RTA XM EVC RTNOPTHY: CPT | Performed by: FAMILY MEDICINE

## 2024-09-17 PROCEDURE — 99214 OFFICE O/P EST MOD 30 MIN: CPT | Performed by: FAMILY MEDICINE

## 2024-09-17 PROCEDURE — G8427 DOCREV CUR MEDS BY ELIG CLIN: HCPCS | Performed by: FAMILY MEDICINE

## 2024-09-17 RX ORDER — AMOXICILLIN 875 MG
875 TABLET ORAL 2 TIMES DAILY
COMMUNITY
Start: 2024-09-15 | End: 2024-09-17 | Stop reason: CLARIF

## 2024-09-17 RX ORDER — CLOBETASOL PROPIONATE 0.5 MG/G
AEROSOL, FOAM TOPICAL 2 TIMES DAILY
COMMUNITY
End: 2024-09-17 | Stop reason: CLARIF

## 2024-09-17 RX ORDER — BETAMETHASONE DIPROPIONATE 0.05 %
OINTMENT (GRAM) TOPICAL AS NEEDED
COMMUNITY
Start: 2024-09-07

## 2024-09-17 ASSESSMENT — PATIENT HEALTH QUESTIONNAIRE - PHQ9
2. FEELING DOWN, DEPRESSED OR HOPELESS: NOT AT ALL
SUM OF ALL RESPONSES TO PHQ QUESTIONS 1-9: 0
SUM OF ALL RESPONSES TO PHQ QUESTIONS 1-9: 0
1. LITTLE INTEREST OR PLEASURE IN DOING THINGS: NOT AT ALL
SUM OF ALL RESPONSES TO PHQ QUESTIONS 1-9: 0
SUM OF ALL RESPONSES TO PHQ9 QUESTIONS 1 & 2: 0
SUM OF ALL RESPONSES TO PHQ QUESTIONS 1-9: 0

## 2024-09-20 ENCOUNTER — TELEMEDICINE (OUTPATIENT)
Age: 69
End: 2024-09-20

## 2024-09-20 DIAGNOSIS — E66.09 CLASS 1 OBESITY DUE TO EXCESS CALORIES WITH SERIOUS COMORBIDITY AND BODY MASS INDEX (BMI) OF 33.0 TO 33.9 IN ADULT: Primary | ICD-10-CM

## 2024-09-25 DIAGNOSIS — G47.09 OTHER INSOMNIA: ICD-10-CM

## 2024-09-27 RX ORDER — HYDROXYZINE HYDROCHLORIDE 25 MG/1
25 TABLET, FILM COATED ORAL NIGHTLY
Qty: 90 TABLET | Refills: 1 | OUTPATIENT
Start: 2024-09-27

## 2024-10-24 ENCOUNTER — OFFICE VISIT (OUTPATIENT)
Age: 69
End: 2024-10-24

## 2024-10-24 VITALS
RESPIRATION RATE: 18 BRPM | HEIGHT: 68 IN | BODY MASS INDEX: 30.65 KG/M2 | DIASTOLIC BLOOD PRESSURE: 82 MMHG | WEIGHT: 202.2 LBS | SYSTOLIC BLOOD PRESSURE: 132 MMHG | HEART RATE: 58 BPM | TEMPERATURE: 98 F | OXYGEN SATURATION: 98 %

## 2024-10-24 DIAGNOSIS — E66.09 CLASS 1 OBESITY DUE TO EXCESS CALORIES WITH SERIOUS COMORBIDITY AND BODY MASS INDEX (BMI) OF 30.0 TO 30.9 IN ADULT: Primary | ICD-10-CM

## 2024-10-24 DIAGNOSIS — E66.811 CLASS 1 OBESITY DUE TO EXCESS CALORIES WITH SERIOUS COMORBIDITY AND BODY MASS INDEX (BMI) OF 30.0 TO 30.9 IN ADULT: Primary | ICD-10-CM

## 2024-10-24 DIAGNOSIS — E78.2 MIXED HYPERLIPIDEMIA: ICD-10-CM

## 2024-10-24 DIAGNOSIS — E11.9 TYPE 2 DIABETES MELLITUS WITHOUT COMPLICATION, WITHOUT LONG-TERM CURRENT USE OF INSULIN (HCC): ICD-10-CM

## 2024-10-24 ASSESSMENT — PATIENT HEALTH QUESTIONNAIRE - PHQ9
SUM OF ALL RESPONSES TO PHQ QUESTIONS 1-9: 0
1. LITTLE INTEREST OR PLEASURE IN DOING THINGS: NOT AT ALL
SUM OF ALL RESPONSES TO PHQ9 QUESTIONS 1 & 2: 0
SUM OF ALL RESPONSES TO PHQ QUESTIONS 1-9: 0
2. FEELING DOWN, DEPRESSED OR HOPELESS: NOT AT ALL
SUM OF ALL RESPONSES TO PHQ QUESTIONS 1-9: 0
SUM OF ALL RESPONSES TO PHQ QUESTIONS 1-9: 0

## 2024-10-24 NOTE — PROGRESS NOTES
Identified pt with two pt identifiers (name and ). Reviewed chart in preparation for visit and have obtained necessary documentation.    Billie Nelson is a 69 y.o. female  Chief Complaint   Patient presents with    Weight Management     1 month follow up     /82 (Site: Right Upper Arm, Position: Sitting, Cuff Size: Large Adult) Comment: manual  Pulse 58   Temp 98 °F (36.7 °C) (Oral)   Resp 18   Ht 1.727 m (5' 8\")   Wt 91.7 kg (202 lb 3.2 oz)   SpO2 98%   BMI 30.74 kg/m²     1. Have you been to the ER, urgent care clinic since your last visit?  Hospitalized since your last visit?no    2. Have you seen or consulted any other health care providers outside of the [facility] since your last visit?  Include any pap smears or colon screening. No    BMI - 30.6

## 2024-10-24 NOTE — PROGRESS NOTES
[facility] Progress Note:   F/up Physician Visit    CC: Weight Management      Billie Nelson is a 69 y.o. female who is here for her  f/up physician visit for the  / LCD Program.    Now 202 Sept 203     Started 246  Lost 44 lbs so far  Waist is down another 1/2 inch      She has the urge to urinate each night and that interrupts her sleep            10/24/2024     1:00 PM 10/24/2024    12:57 PM 10/16/2024     1:34 PM 9/17/2024    10:46 AM 9/17/2024    10:00 AM 8/30/2024     9:35 AM 8/27/2024    10:38 AM   Weight Metrics   Weight  202 lb 3.2 oz 208 lb 203 lb 8 oz  208 lb 9.6 oz 204 lb 12.8 oz   Neck (Inches) 14 in    13.75 in     Waist Measure Inches 41 in    41.5 in     Body Fat % 40.7 %    42.2 %     BMI (Calculated)  30.8 kg/m2 31.7 kg/m2 32.9 kg/m2  33.7 kg/m2 33.1 kg/m2          No data to display                   Current Outpatient Medications   Medication Sig Dispense Refill    meloxicam (MOBIC) 15 MG tablet Take 1 tablet by mouth daily 30 tablet 1    betamethasone dipropionate 0.05 % ointment Apply topically as needed      topiramate (TOPAMAX) 25 MG tablet Take 1 tablet by mouth daily Does not take on a regular basis      hydrOXYzine HCl (ATARAX) 25 MG tablet Take 1 tablet by mouth as needed      cetirizine (ZYRTEC) 10 MG tablet Take 1 tablet by mouth daily      amoxicillin (AMOXIL) 500 MG capsule TAKE 4 CAPSULES PRIOR TO PROCEDURE. 4 capsule 4    NONFORMULARY Inject as directed Cortisone injection in left knee every 3 months      vitamin C (ASCORBIC ACID) 500 MG tablet Take 1 tablet by mouth as needed      valACYclovir (VALTREX) 500 MG tablet Take 1 tablet by mouth daily      ZINC PO Take 25 mg by mouth daily      pravastatin (PRAVACHOL) 20 MG tablet Take 1 tablet by mouth daily      Multiple Vitamin (MULTIVITAMIN PO) Take 1 tablet by mouth daily      metFORMIN (GLUCOPHAGE-XR) 500 MG extended release tablet Take 2 tablets by mouth daily       No current facility-administered

## 2024-10-28 ENCOUNTER — TELEMEDICINE (OUTPATIENT)
Age: 69
End: 2024-10-28

## 2024-10-28 DIAGNOSIS — E66.09 CLASS 1 OBESITY DUE TO EXCESS CALORIES WITH SERIOUS COMORBIDITY AND BODY MASS INDEX (BMI) OF 30.0 TO 30.9 IN ADULT: Primary | ICD-10-CM

## 2024-10-28 DIAGNOSIS — E66.811 CLASS 1 OBESITY DUE TO EXCESS CALORIES WITH SERIOUS COMORBIDITY AND BODY MASS INDEX (BMI) OF 30.0 TO 30.9 IN ADULT: Primary | ICD-10-CM

## 2024-10-28 NOTE — PROGRESS NOTES
[facility]  Metabolic Program Follow-up Nutrition Consult    Date: 10/28/2024   Physician: LIZETTE Alford MD  Name: Billie Nelson  :  1955    Type of Plan: LCD 7 months, maintenance now  Weeks on Plan: 1 month on maintenance   Virtual visit was completed through Zoom.    ASSESSMENT:    Medications/Supplements:   Prior to Admission medications    Medication Sig Start Date End Date Taking? Authorizing Provider   meloxicam (MOBIC) 15 MG tablet Take 1 tablet by mouth daily 10/16/24   Sylvia Agustin PA-C   betamethasone dipropionate 0.05 % ointment Apply topically as needed 24   Obdulio Alarcon MD   topiramate (TOPAMAX) 25 MG tablet Take 1 tablet by mouth daily Does not take on a regular basis 24   Obdulio Alarcon MD   hydrOXYzine HCl (ATARAX) 25 MG tablet Take 1 tablet by mouth as needed 24   Obdulio Alarcon MD   cetirizine (ZYRTEC) 10 MG tablet Take 1 tablet by mouth daily    Obdulio Alarcon MD   amoxicillin (AMOXIL) 500 MG capsule TAKE 4 CAPSULES PRIOR TO PROCEDURE. 24   Sylvia Agustin PA-C   NONFORMULARY Inject as directed Cortisone injection in left knee every 3 months    Obdulio Alarcon MD   vitamin C (ASCORBIC ACID) 500 MG tablet Take 1 tablet by mouth as needed    Obdulio Alarcon MD   valACYclovir (VALTREX) 500 MG tablet Take 1 tablet by mouth daily 23   Obdulio Alarcon MD   ZINC PO Take 25 mg by mouth daily    Obdulio Alarcon MD   pravastatin (PRAVACHOL) 20 MG tablet Take 1 tablet by mouth daily 23   Obdulio Alarcon MD   Multiple Vitamin (MULTIVITAMIN PO) Take 1 tablet by mouth daily    Automatic Reconciliation, Ar   metFORMIN (GLUCOPHAGE-XR) 500 MG extended release tablet Take 2 tablets by mouth daily 3/20/21   Automatic Reconciliation, Ar              Starting Weight: 243#  Current Weight: 202# (203# last visit one month ago)   Overall Pounds Lost: 41# Overall Pounds Gained:

## 2024-12-02 ENCOUNTER — TELEMEDICINE (OUTPATIENT)
Age: 69
End: 2024-12-02

## 2024-12-02 DIAGNOSIS — E66.09 CLASS 1 OBESITY DUE TO EXCESS CALORIES WITH SERIOUS COMORBIDITY AND BODY MASS INDEX (BMI) OF 30.0 TO 30.9 IN ADULT: Primary | ICD-10-CM

## 2024-12-02 DIAGNOSIS — E66.811 CLASS 1 OBESITY DUE TO EXCESS CALORIES WITH SERIOUS COMORBIDITY AND BODY MASS INDEX (BMI) OF 30.0 TO 30.9 IN ADULT: Primary | ICD-10-CM

## 2024-12-02 NOTE — PROGRESS NOTES
[facility] [place]  Metabolic Program Follow-up Nutrition Consult    Date: 2024   Physician: Ellyn Alford MD  Name: Billie Nelson  :  1955    Type of Plan: LCD 7 months, 2 months maintenance.  Weeks on Plan: 2 months maintenance    Consent:  Patient and/or their healthcare decision maker is aware that this patient-initiated Telehealth or audio encounter is a complementary visit as part of the comprehensive VLCD/LCD meal replacement program offered at [facility] [place].  Patient is aware if they discontinue the meal replacement program, all future RD visits with this provider will become a billable service, with coverage as determined by their insurance carrier.  Patient has provided verbal understanding and consent to proceed: yes, confirmed      Billie ABHISHEK Leslie, was evaluated through a synchronous (real-time) audio-video encounter. The patient (or guardian if applicable) is aware that this is a billable service, which includes applicable co-pays. This Virtual Visit was conducted with patient's (and/or legal guardian's) consent. Patient identification was verified, and a caregiver was present when appropriate.   The patient was located at Home: [address] [place] [address]  Provider was located at Home (not Appt Dept State): [place]  Confirm you are appropriately licensed, registered, or certified to deliver care in the state where the patient is located as indicated above. If you are not or unsure, please re-schedule the visit: Yes, I confirm.      --ARIELLE GILES, RD on 2024 at 11:32 AM    An electronic signature was used to authenticate this note.      ASSESSMENT:    Medications/Supplements:   Prior to Admission medications    Medication Sig Start Date End Date Taking? Authorizing Provider   meloxicam (MOBIC) 15 MG tablet Take 1 tablet by mouth daily 10/16/24   Sylvia Agustin PA-C   betamethasone dipropionate 0.05 % ointment Apply

## 2024-12-30 ENCOUNTER — TELEPHONE (OUTPATIENT)
Age: 69
End: 2024-12-30

## 2025-01-06 ENCOUNTER — OFFICE VISIT (OUTPATIENT)
Age: 70
End: 2025-01-06
Payer: MEDICARE

## 2025-01-06 VITALS
OXYGEN SATURATION: 100 % | HEIGHT: 64 IN | TEMPERATURE: 98 F | RESPIRATION RATE: 16 BRPM | WEIGHT: 212.2 LBS | DIASTOLIC BLOOD PRESSURE: 86 MMHG | BODY MASS INDEX: 36.23 KG/M2 | HEART RATE: 56 BPM | SYSTOLIC BLOOD PRESSURE: 142 MMHG

## 2025-01-06 DIAGNOSIS — Z79.899 ENCOUNTER FOR LONG-TERM (CURRENT) USE OF MEDICATIONS: ICD-10-CM

## 2025-01-06 DIAGNOSIS — H61.23 BILATERAL IMPACTED CERUMEN: ICD-10-CM

## 2025-01-06 DIAGNOSIS — E78.00 HYPERCHOLESTEROLEMIA: ICD-10-CM

## 2025-01-06 DIAGNOSIS — R41.3 GRADUAL-ONSET MEMORY IMPAIRMENT: ICD-10-CM

## 2025-01-06 DIAGNOSIS — Z00.00 MEDICARE ANNUAL WELLNESS VISIT, SUBSEQUENT: Primary | ICD-10-CM

## 2025-01-06 DIAGNOSIS — R41.3 MILD MEMORY DISTURBANCE: ICD-10-CM

## 2025-01-06 DIAGNOSIS — R35.0 URINARY FREQUENCY: ICD-10-CM

## 2025-01-06 DIAGNOSIS — J06.9 URI, ACUTE: ICD-10-CM

## 2025-01-06 DIAGNOSIS — R07.89 ATYPICAL CHEST PAIN: ICD-10-CM

## 2025-01-06 DIAGNOSIS — R73.03 PREDIABETES: ICD-10-CM

## 2025-01-06 DIAGNOSIS — R82.90 NONSPECIFIC FINDINGS ON EXAMINATION OF URINE: ICD-10-CM

## 2025-01-06 PROBLEM — E11.9 DIABETES MELLITUS (HCC): Status: RESOLVED | Noted: 2023-08-14 | Resolved: 2025-01-06

## 2025-01-06 LAB
ALBUMIN SERPL-MCNC: 3.7 G/DL (ref 3.5–5)
ALBUMIN/GLOB SERPL: 1.1 (ref 1.1–2.2)
ALP SERPL-CCNC: 104 U/L (ref 45–117)
ALT SERPL-CCNC: 32 U/L (ref 12–78)
ANION GAP SERPL CALC-SCNC: 5 MMOL/L (ref 2–12)
AST SERPL-CCNC: 22 U/L (ref 15–37)
BILIRUB SERPL-MCNC: 0.4 MG/DL (ref 0.2–1)
BUN SERPL-MCNC: 19 MG/DL (ref 6–20)
BUN/CREAT SERPL: 20 (ref 12–20)
CALCIUM SERPL-MCNC: 10.2 MG/DL (ref 8.5–10.1)
CHLORIDE SERPL-SCNC: 105 MMOL/L (ref 97–108)
CHOLEST SERPL-MCNC: 193 MG/DL
CO2 SERPL-SCNC: 29 MMOL/L (ref 21–32)
COMMENT:: NORMAL
CREAT SERPL-MCNC: 0.97 MG/DL (ref 0.55–1.02)
ERYTHROCYTE [DISTWIDTH] IN BLOOD BY AUTOMATED COUNT: 14.5 % (ref 11.5–14.5)
GLOBULIN SER CALC-MCNC: 3.4 G/DL (ref 2–4)
GLUCOSE SERPL-MCNC: 75 MG/DL (ref 65–100)
GLUCOSE, POC: 89 MG/DL
HBA1C MFR BLD: 5.3 %
HCT VFR BLD AUTO: 39.7 % (ref 35–47)
HDLC SERPL-MCNC: 89 MG/DL
HDLC SERPL: 2.2 (ref 0–5)
HGB BLD-MCNC: 12.7 G/DL (ref 11.5–16)
LDLC SERPL CALC-MCNC: 93.8 MG/DL (ref 0–100)
Lab: NORMAL
MCH RBC QN AUTO: 31.4 PG (ref 26–34)
MCHC RBC AUTO-ENTMCNC: 32 G/DL (ref 30–36.5)
MCV RBC AUTO: 98 FL (ref 80–99)
NRBC # BLD: 0 K/UL (ref 0–0.01)
NRBC BLD-RTO: 0 PER 100 WBC
PLATELET # BLD AUTO: 341 K/UL (ref 150–400)
PMV BLD AUTO: 11.1 FL (ref 8.9–12.9)
POTASSIUM SERPL-SCNC: 4.2 MMOL/L (ref 3.5–5.1)
PROT SERPL-MCNC: 7.1 G/DL (ref 6.4–8.2)
QC PASS/FAIL: NORMAL
RBC # BLD AUTO: 4.05 M/UL (ref 3.8–5.2)
SARS-COV-2, POC: NORMAL
SODIUM SERPL-SCNC: 139 MMOL/L (ref 136–145)
SPECIMEN HOLD: NORMAL
TRIGL SERPL-MCNC: 51 MG/DL
TSH SERPL DL<=0.05 MIU/L-ACNC: 1.46 UIU/ML (ref 0.36–3.74)
VLDLC SERPL CALC-MCNC: 10.2 MG/DL
WBC # BLD AUTO: 8.8 K/UL (ref 3.6–11)

## 2025-01-06 PROCEDURE — 99214 OFFICE O/P EST MOD 30 MIN: CPT | Performed by: FAMILY MEDICINE

## 2025-01-06 PROCEDURE — 1123F ACP DISCUSS/DSCN MKR DOCD: CPT | Performed by: FAMILY MEDICINE

## 2025-01-06 PROCEDURE — 3017F COLORECTAL CA SCREEN DOC REV: CPT | Performed by: FAMILY MEDICINE

## 2025-01-06 PROCEDURE — G8427 DOCREV CUR MEDS BY ELIG CLIN: HCPCS | Performed by: FAMILY MEDICINE

## 2025-01-06 PROCEDURE — PBSHW AMB POC GLUCOSE BLOOD, BY GLUCOSE MONITORING DEVICE: Performed by: FAMILY MEDICINE

## 2025-01-06 PROCEDURE — PBSHW POCT COVID-19, SARS-COV-2, PCR: Performed by: FAMILY MEDICINE

## 2025-01-06 PROCEDURE — 1036F TOBACCO NON-USER: CPT | Performed by: FAMILY MEDICINE

## 2025-01-06 PROCEDURE — G8417 CALC BMI ABV UP PARAM F/U: HCPCS | Performed by: FAMILY MEDICINE

## 2025-01-06 PROCEDURE — 1126F AMNT PAIN NOTED NONE PRSNT: CPT | Performed by: FAMILY MEDICINE

## 2025-01-06 PROCEDURE — PBSHW AMB POC HEMOGLOBIN A1C: Performed by: FAMILY MEDICINE

## 2025-01-06 PROCEDURE — 87635 SARS-COV-2 COVID-19 AMP PRB: CPT | Performed by: FAMILY MEDICINE

## 2025-01-06 PROCEDURE — G8399 PT W/DXA RESULTS DOCUMENT: HCPCS | Performed by: FAMILY MEDICINE

## 2025-01-06 PROCEDURE — 1159F MED LIST DOCD IN RCRD: CPT | Performed by: FAMILY MEDICINE

## 2025-01-06 PROCEDURE — G0439 PPPS, SUBSEQ VISIT: HCPCS | Performed by: FAMILY MEDICINE

## 2025-01-06 PROCEDURE — 82962 GLUCOSE BLOOD TEST: CPT | Performed by: FAMILY MEDICINE

## 2025-01-06 PROCEDURE — 83036 HEMOGLOBIN GLYCOSYLATED A1C: CPT | Performed by: FAMILY MEDICINE

## 2025-01-06 PROCEDURE — 1090F PRES/ABSN URINE INCON ASSESS: CPT | Performed by: FAMILY MEDICINE

## 2025-01-06 PROCEDURE — 1160F RVW MEDS BY RX/DR IN RCRD: CPT | Performed by: FAMILY MEDICINE

## 2025-01-06 RX ORDER — PREDNISONE 10 MG/1
10 TABLET ORAL 2 TIMES DAILY
Qty: 10 TABLET | Refills: 0 | Status: SHIPPED | OUTPATIENT
Start: 2025-01-06 | End: 2025-01-11

## 2025-01-06 ASSESSMENT — PATIENT HEALTH QUESTIONNAIRE - PHQ9
SUM OF ALL RESPONSES TO PHQ QUESTIONS 1-9: 0
SUM OF ALL RESPONSES TO PHQ QUESTIONS 1-9: 0
1. LITTLE INTEREST OR PLEASURE IN DOING THINGS: NOT AT ALL
SUM OF ALL RESPONSES TO PHQ QUESTIONS 1-9: 0
SUM OF ALL RESPONSES TO PHQ9 QUESTIONS 1 & 2: 0
SUM OF ALL RESPONSES TO PHQ QUESTIONS 1-9: 0
2. FEELING DOWN, DEPRESSED OR HOPELESS: NOT AT ALL

## 2025-01-06 ASSESSMENT — ENCOUNTER SYMPTOMS
COUGH: 1
CONSTIPATION: 0
CHEST TIGHTNESS: 1
WHEEZING: 0
NAUSEA: 0
SINUS PRESSURE: 0
RHINORRHEA: 0
VOMITING: 0
SORE THROAT: 1
DIARRHEA: 0
SHORTNESS OF BREATH: 0
ABDOMINAL PAIN: 0
BLOOD IN STOOL: 0

## 2025-01-06 ASSESSMENT — LIFESTYLE VARIABLES
HOW MANY STANDARD DRINKS CONTAINING ALCOHOL DO YOU HAVE ON A TYPICAL DAY: PATIENT DOES NOT DRINK
HOW OFTEN DO YOU HAVE A DRINK CONTAINING ALCOHOL: NEVER

## 2025-01-06 NOTE — PROGRESS NOTES
Chief Complaint   Patient presents with    Medicare AWV       \"Have you been to the ER, urgent care clinic since your last visit?  Hospitalized since your last visit?\"    YES, Clinic for cold    “Have you seen or consulted any other health care providers outside of [facility] since your last visit?”    YES, [facility]    Vitals:    25 1054 25 1055   BP: (!) 151/88 (!) 156/82   Site: Right Upper Arm Right Upper Arm   Position: Sitting Sitting   Cuff Size: Large Adult Large Adult   Pulse: 56    Resp: 16    Temp: 98 °F (36.7 °C)    TempSrc: Oral    SpO2: 100%    Weight: 96.3 kg (212 lb 3.2 oz)    Height: 1.626 m (5' 4\")             Click Here for Release of Records Request      There were no vitals filed for this visit.    Health Maintenance Due   Topic Date Due    Flu vaccine (1) 2024    Annual Wellness Visit (Medicare)  08/15/2024    COVID-19 Vaccine ( season) 2024        The patient, Billie Nelson, identity was verified by name and .   
Medicare Annual Wellness Visit    Billie Nelson is here for Medicare AWV    Assessment & Plan   Medicare annual wellness visit, subsequent  Prediabetes  -     AMB POC HEMOGLOBIN A1C  -     AMB POC GLUCOSE BLOOD, BY GLUCOSE MONITORING DEVICE  Hypercholesterolemia  -     Lipid Panel; Future  URI, acute  -     predniSONE (DELTASONE) 10 MG tablet; Take 1 tablet by mouth 2 times daily for 5 days, Disp-10 tablet, R-0Normal  -     POCT COVID-19, SARS-COV-2, PCR  Atypical chest pain  -     EKG 12 Lead; Future  Urinary frequency  -     Urinalysis with Microscopic; Future  -     External Referral To Urology  Nonspecific findings on examination of urine  -     Culture, Urine; Future  Mild memory disturbance  Bilateral impacted cerumen  Encounter for long-term (current) use of medications  -     Comprehensive Metabolic Panel; Future       Return in about 4 months (around 5/6/2025).     Subjective   Patient's complete Health Risk Assessment and screening values have been reviewed and are found in Flowsheets. The following problems were reviewed today and where indicated follow up appointments were made and/or referrals ordered.    Positive Risk Factor Screenings with Interventions:              Inactivity:  On average, how many days per week do you engage in moderate to strenuous exercise (like a brisk walk)?: 1 day (!) Abnormal  On average, how many minutes do you engage in exercise at this level?: 40 min  Interventions:  See AVS for additional education material     Abnormal BMI (obese):  Body mass index is 36.42 kg/m². (!) Abnormal  Interventions:  See A/P for plan and any pertinent orders         Safety:  Do you have any tripping hazards - loose or unsecured carpets or rugs?: (!) Yes  Interventions:  See AVS for additional education material             Objective   Vitals:    01/06/25 1054 01/06/25 1055 01/06/25 1232   BP: (!) 151/88 (!) 156/82 (!) 142/86   Site: Right Upper Arm Right Upper Arm    Position: Sitting 
the use of shared decision making we have agreed to the above plan. The patient has received an after-visit summary and questions were answered concerning future plans and follow up.  Advise pt of any urgent changes then to proceed to the ER.            Oma Krause MD

## 2025-01-07 LAB
APPEARANCE UR: CLEAR
BACTERIA SPEC CULT: NORMAL
BACTERIA URNS QL MICRO: NEGATIVE /HPF
BILIRUB UR QL: NEGATIVE
COLOR UR: NORMAL
EPITH CASTS URNS QL MICRO: NORMAL /LPF
GLUCOSE UR STRIP.AUTO-MCNC: NEGATIVE MG/DL
HGB UR QL STRIP: NEGATIVE
HYALINE CASTS URNS QL MICRO: NORMAL /LPF (ref 0–5)
KETONES UR QL STRIP.AUTO: NEGATIVE MG/DL
LEUKOCYTE ESTERASE UR QL STRIP.AUTO: NEGATIVE
NITRITE UR QL STRIP.AUTO: NEGATIVE
PH UR STRIP: 7 (ref 5–8)
PROT UR STRIP-MCNC: NEGATIVE MG/DL
RBC #/AREA URNS HPF: NORMAL /HPF (ref 0–5)
SERVICE CMNT-IMP: NORMAL
SP GR UR REFRACTOMETRY: <1.005 (ref 1–1.03)
UROBILINOGEN UR QL STRIP.AUTO: 0.2 EU/DL (ref 0.2–1)
WBC URNS QL MICRO: NORMAL /HPF (ref 0–4)

## 2025-02-05 NOTE — PERIOP NOTES
TRANSFER - OUT REPORT:    Verbal report given to Ruthie Burgos (name) on [facility]  being transferred to [facility](unit) for routine post - op       Report consisted of patients Situation, Background, Assessment and   Recommendations(SBAR). Time Pre op antibiotic [id number]  Anesthesia Stop time: 9217  Gloria Present on Transfer to floor:n  Order for Gloria on Chart:n  Discharge Prescriptions with Chart:n    Information from the following report(s) SBAR, OR Summary, Intake/Output, MAR and Accordion was reviewed with the receiving nurse. Opportunity for questions and clarification was provided. Is the patient on 02? NO       L/Min        Other     Is the patient on a monitor? NO    Is the nurse transporting with the patient? NO    Surgical Waiting Area notified of patient's transfer from PACU? YES      The following personal items collected during your admission accompanied patient upon transfer:   Dental Appliance: Dental Appliances: None  Vision:    Hearing Aid:    Jewelry:    Clothing: Clothing: With patient  Other Valuables:  Other Valuables: Eyeglasses,With patient  Valuables sent to safe:        Clothes and glasses to floor with pt [FreeTextEntry1] :  I, Hoa Brown ([facility]) assisted in filling out this chart under the dictation of Raz Encinas on 02/04/2025

## 2025-02-10 ENCOUNTER — TELEPHONE (OUTPATIENT)
Age: 70
End: 2025-02-10

## 2025-02-11 ENCOUNTER — TELEMEDICINE (OUTPATIENT)
Age: 70
End: 2025-02-11

## 2025-02-11 VITALS
HEART RATE: 65 BPM | SYSTOLIC BLOOD PRESSURE: 141 MMHG | WEIGHT: 203.2 LBS | HEIGHT: 64 IN | DIASTOLIC BLOOD PRESSURE: 90 MMHG | OXYGEN SATURATION: 98 % | BODY MASS INDEX: 34.69 KG/M2

## 2025-02-11 DIAGNOSIS — R41.3 MEMORY PROBLEM: ICD-10-CM

## 2025-02-11 DIAGNOSIS — E78.2 MIXED HYPERLIPIDEMIA: ICD-10-CM

## 2025-02-11 DIAGNOSIS — E66.09 CLASS 1 OBESITY DUE TO EXCESS CALORIES WITH SERIOUS COMORBIDITY AND BODY MASS INDEX (BMI) OF 34.0 TO 34.9 IN ADULT: Primary | ICD-10-CM

## 2025-02-11 DIAGNOSIS — R40.0 DAYTIME SOMNOLENCE: ICD-10-CM

## 2025-02-11 DIAGNOSIS — E11.9 TYPE 2 DIABETES MELLITUS WITHOUT COMPLICATION, WITHOUT LONG-TERM CURRENT USE OF INSULIN (HCC): ICD-10-CM

## 2025-02-11 DIAGNOSIS — G47.8 UNREFRESHED BY SLEEP: ICD-10-CM

## 2025-02-11 DIAGNOSIS — E66.811 CLASS 1 OBESITY DUE TO EXCESS CALORIES WITH SERIOUS COMORBIDITY AND BODY MASS INDEX (BMI) OF 34.0 TO 34.9 IN ADULT: Primary | ICD-10-CM

## 2025-02-11 ASSESSMENT — PATIENT HEALTH QUESTIONNAIRE - PHQ9
SUM OF ALL RESPONSES TO PHQ QUESTIONS 1-9: 0
2. FEELING DOWN, DEPRESSED OR HOPELESS: NOT AT ALL
SUM OF ALL RESPONSES TO PHQ QUESTIONS 1-9: 0
1. LITTLE INTEREST OR PLEASURE IN DOING THINGS: NOT AT ALL
SUM OF ALL RESPONSES TO PHQ9 QUESTIONS 1 & 2: 0

## 2025-02-11 NOTE — PROGRESS NOTES
Identified pt with two pt identifiers (name and ). Reviewed chart in preparation for visit and have obtained necessary documentation.    Billie Nelson is a 69 y.o. female  Chief Complaint   Patient presents with    Weight Management     Maintenance Follow up     BP (!) 141/90   Pulse 65   Ht 1.626 m (5' 4\")   Wt 92.2 kg (203 lb 3.2 oz)   SpO2 98%   BMI 34.88 kg/m²     1. Have you been to the ER, urgent care clinic since your last visit?  Hospitalized since your last visit?no    2. Have you seen or consulted any other health care providers outside of the [facility] since your last visit?  Include any pap smears or colon screening. No    CONCERNS TO DISCUSS WITH DR. ALFORD;    Short-term memory  Sleep  Two referrals - Neurology & Neuropsychology (in chart) - entered on     Would like referrals from Dr. Alford - trusts her judgment more in terms of the providers referred to...

## 2025-02-11 NOTE — PROGRESS NOTES
[facility] Progress Note:   F/up Physician Visit    Billie Nelson is a 69 y.o. female who was seen by synchronous (real-time) audio-video technology on 2/11/2025.      Consent:  She and/or her healthcare decision maker is aware that this patient-initiated Telehealth encounter is a billable service, with coverage as determined by her insurance carrier. She is aware that she may receive a bill and has provided verbal consent to proceed: Yes    Billie Nelson, was evaluated through a synchronous (real-time) audio-video encounter. The patient (or guardian if applicable) is aware that this is a billable service, which includes applicable co-pays. This Virtual Visit was conducted with patient's (and/or legal guardian's) consent. Patient identification was verified, and a caregiver was present when appropriate.   The patient was located at Home: [address] [facility] [address]  Provider was located at Home (Appt Dept State): [place]  Confirm you are appropriately licensed, registered, or certified to deliver care in the state where the patient is located as indicated above. If you are not or unsure, please re-schedule the visit: Yes, I confirm.          --Ellyn Alford MD on 2/11/2025 at 5:37 PM           [id number]  Subjective:   Bilile Nelson was seen for Weight Management (Maintenance Follow up)    f/up physician visit for the / LCD Program.      Oct 202  Now 89  C/o trouble remembering things like what she ate this morning   Finds herself struggling to stay awake in the daytime  She is waking 3-5 times a night and sleeps about 3-4  hours a night    She saw a new pcp who has referred her to a neurologist. She wants me to help her with another name for neurologist, she was given an appt with an NP and she wants an mD  She also has a referral to neuropsychology      Prior to Admission medications    Medication Sig Start Date End Date Taking? Authorizing Provider   Apoaequorin (PREVAGEN) 10 MG

## 2025-02-11 NOTE — TELEPHONE ENCOUNTER
Patient stated she recently completed labs ordered by Dr Andrew Feliz and would like you to review results   
Patient would like a referral to neuropsychologist as she is experiencing issues with memory. Patient interested in sooner appointment with [place] near the [place]; Patient also has concerns about the physician health grades of providers   
Spoke with patient during appointment check-in.  Advised her that I would let Dr. Alford know.    Patient verbalized understanding.  
Yes

## 2025-03-04 ENCOUNTER — OFFICE VISIT (OUTPATIENT)
Age: 70
End: 2025-03-04
Payer: MEDICARE

## 2025-03-04 VITALS
RESPIRATION RATE: 18 BRPM | BODY MASS INDEX: 36.84 KG/M2 | WEIGHT: 214.6 LBS | OXYGEN SATURATION: 99 % | HEART RATE: 69 BPM | SYSTOLIC BLOOD PRESSURE: 139 MMHG | TEMPERATURE: 98.3 F | DIASTOLIC BLOOD PRESSURE: 74 MMHG

## 2025-03-04 DIAGNOSIS — H69.91 DYSFUNCTION OF RIGHT EUSTACHIAN TUBE: ICD-10-CM

## 2025-03-04 DIAGNOSIS — J06.9 ACUTE URI: Primary | ICD-10-CM

## 2025-03-04 LAB
Lab: NORMAL
QC PASS/FAIL: YES
SARS-COV-2, POC: NORMAL

## 2025-03-04 PROCEDURE — 99213 OFFICE O/P EST LOW 20 MIN: CPT | Performed by: FAMILY MEDICINE

## 2025-03-04 PROCEDURE — 87635 SARS-COV-2 COVID-19 AMP PRB: CPT | Performed by: FAMILY MEDICINE

## 2025-03-04 RX ORDER — MELOXICAM 15 MG/1
15 TABLET ORAL DAILY PRN
COMMUNITY
Start: 2025-03-04

## 2025-03-04 RX ORDER — FLUTICASONE PROPIONATE 50 MCG
2 SPRAY, SUSPENSION (ML) NASAL DAILY
Qty: 48 G | Refills: 1
Start: 2025-03-04

## 2025-03-04 RX ORDER — LORATADINE 10 MG/1
10 TABLET ORAL DAILY PRN
Qty: 30 TABLET | Refills: 1
Start: 2025-03-04

## 2025-03-04 RX ORDER — ALBUTEROL SULFATE 90 UG/1
2 INHALANT RESPIRATORY (INHALATION) EVERY 6 HOURS PRN
COMMUNITY
Start: 2025-02-21

## 2025-03-04 SDOH — ECONOMIC STABILITY: FOOD INSECURITY: WITHIN THE PAST 12 MONTHS, YOU WORRIED THAT YOUR FOOD WOULD RUN OUT BEFORE YOU GOT MONEY TO BUY MORE.: NEVER TRUE

## 2025-03-04 SDOH — ECONOMIC STABILITY: FOOD INSECURITY: WITHIN THE PAST 12 MONTHS, THE FOOD YOU BOUGHT JUST DIDN'T LAST AND YOU DIDN'T HAVE MONEY TO GET MORE.: NEVER TRUE

## 2025-03-04 ASSESSMENT — ENCOUNTER SYMPTOMS
SHORTNESS OF BREATH: 0
WHEEZING: 0
DIARRHEA: 0
SORE THROAT: 0
VOMITING: 0
RHINORRHEA: 1
CHEST TIGHTNESS: 0
SINUS PRESSURE: 0
NAUSEA: 0
COUGH: 1

## 2025-03-04 ASSESSMENT — PATIENT HEALTH QUESTIONNAIRE - PHQ9
SUM OF ALL RESPONSES TO PHQ QUESTIONS 1-9: 0
1. LITTLE INTEREST OR PLEASURE IN DOING THINGS: NOT AT ALL
SUM OF ALL RESPONSES TO PHQ QUESTIONS 1-9: 0
2. FEELING DOWN, DEPRESSED OR HOPELESS: NOT AT ALL

## 2025-03-04 NOTE — PROGRESS NOTES
Chief Complaint   Patient presents with    Cold Symptoms     Patient states she has been having cold symptoms for 3 weeks. Cough and Mucus has improved a little.       \"Have you been to the ER, urgent care clinic since your last visit?  Hospitalized since your last visit?\"    YES - When: approximately 1  weeks ago.  Where and Why[facility].    “Have you seen or consulted any other health care providers outside of [facility] since your last visit?”    NO            Click Here for Release of Records Request       There were no vitals filed for this visit.   Health Maintenance Due   Topic Date Due    COVID-19 Vaccine ( season) 10/25/2024        The patient, Billie Nelson, identity was verified by name and .       
     Dysfunction of right eustachian tube  -     loratadine (CLARITIN) 10 MG tablet; Take 1 tablet by mouth daily as needed (allergies)  -     fluticasone (FLONASE) 50 MCG/ACT nasal spray; 2 sprays by Each Nostril route daily      Return if symptoms worsen or fail to improve.  reviewed medications and side effects in detail           I have discussed diagnosis listed in this note with pt and/or family. I have discussed treatment plans and options and the risk/benefit analysis of those options, including safe use of medications and possible medication side effects.   Through the use of shared decision making we have agreed to the above plan. The patient has received an after-visit summary and questions were answered concerning future plans and follow up.  Advise pt of any urgent changes then to proceed to the ER.            Oma Krause MD

## 2025-05-07 ENCOUNTER — OFFICE VISIT (OUTPATIENT)
Age: 70
End: 2025-05-07
Payer: MEDICARE

## 2025-05-07 VITALS
WEIGHT: 213.8 LBS | TEMPERATURE: 98.1 F | DIASTOLIC BLOOD PRESSURE: 82 MMHG | SYSTOLIC BLOOD PRESSURE: 124 MMHG | BODY MASS INDEX: 36.7 KG/M2 | RESPIRATION RATE: 20 BRPM | HEART RATE: 57 BPM | OXYGEN SATURATION: 98 %

## 2025-05-07 DIAGNOSIS — E78.00 HYPERCHOLESTEROLEMIA: Primary | ICD-10-CM

## 2025-05-07 DIAGNOSIS — Z79.899 ENCOUNTER FOR LONG-TERM (CURRENT) USE OF MEDICATIONS: ICD-10-CM

## 2025-05-07 DIAGNOSIS — M17.11 PRIMARY OSTEOARTHRITIS OF RIGHT KNEE: ICD-10-CM

## 2025-05-07 DIAGNOSIS — R41.3 MILD MEMORY DISTURBANCE: ICD-10-CM

## 2025-05-07 DIAGNOSIS — R73.03 PREDIABETES: ICD-10-CM

## 2025-05-07 PROCEDURE — 3017F COLORECTAL CA SCREEN DOC REV: CPT | Performed by: FAMILY MEDICINE

## 2025-05-07 PROCEDURE — 1090F PRES/ABSN URINE INCON ASSESS: CPT | Performed by: FAMILY MEDICINE

## 2025-05-07 PROCEDURE — 1160F RVW MEDS BY RX/DR IN RCRD: CPT | Performed by: FAMILY MEDICINE

## 2025-05-07 PROCEDURE — 99214 OFFICE O/P EST MOD 30 MIN: CPT | Performed by: FAMILY MEDICINE

## 2025-05-07 PROCEDURE — 1126F AMNT PAIN NOTED NONE PRSNT: CPT | Performed by: FAMILY MEDICINE

## 2025-05-07 PROCEDURE — 1159F MED LIST DOCD IN RCRD: CPT | Performed by: FAMILY MEDICINE

## 2025-05-07 PROCEDURE — 1123F ACP DISCUSS/DSCN MKR DOCD: CPT | Performed by: FAMILY MEDICINE

## 2025-05-07 PROCEDURE — 1036F TOBACCO NON-USER: CPT | Performed by: FAMILY MEDICINE

## 2025-05-07 PROCEDURE — G8417 CALC BMI ABV UP PARAM F/U: HCPCS | Performed by: FAMILY MEDICINE

## 2025-05-07 PROCEDURE — G8399 PT W/DXA RESULTS DOCUMENT: HCPCS | Performed by: FAMILY MEDICINE

## 2025-05-07 PROCEDURE — G8427 DOCREV CUR MEDS BY ELIG CLIN: HCPCS | Performed by: FAMILY MEDICINE

## 2025-05-07 RX ORDER — DOXYCYCLINE 100 MG/1
100 CAPSULE ORAL 2 TIMES DAILY
COMMUNITY
Start: 2025-04-23

## 2025-05-07 RX ORDER — BETAMETHASONE DIPROPIONATE 0.5 MG/G
LOTION TOPICAL DAILY
COMMUNITY
Start: 2025-04-23

## 2025-05-07 RX ORDER — METFORMIN HYDROCHLORIDE 500 MG/1
1000 TABLET, EXTENDED RELEASE ORAL DAILY
Qty: 60 TABLET | Refills: 11 | Status: SHIPPED | OUTPATIENT
Start: 2025-05-07

## 2025-05-07 RX ORDER — METFORMIN HYDROCHLORIDE 500 MG/1
1000 TABLET, EXTENDED RELEASE ORAL DAILY
Qty: 60 TABLET | Refills: 2 | Status: SHIPPED | OUTPATIENT
Start: 2025-05-07 | End: 2025-05-07 | Stop reason: SDUPTHER

## 2025-05-07 RX ORDER — BETAMETHASONE DIPROPIONATE 0.5 MG/G
OINTMENT, AUGMENTED TOPICAL 2 TIMES DAILY PRN
COMMUNITY
Start: 2025-03-09

## 2025-05-07 ASSESSMENT — PATIENT HEALTH QUESTIONNAIRE - PHQ9
SUM OF ALL RESPONSES TO PHQ QUESTIONS 1-9: 0
2. FEELING DOWN, DEPRESSED OR HOPELESS: NOT AT ALL
1. LITTLE INTEREST OR PLEASURE IN DOING THINGS: NOT AT ALL
SUM OF ALL RESPONSES TO PHQ QUESTIONS 1-9: 0

## 2025-05-07 ASSESSMENT — ENCOUNTER SYMPTOMS
CHEST TIGHTNESS: 0
SHORTNESS OF BREATH: 0
RHINORRHEA: 0
ABDOMINAL PAIN: 0
BLOOD IN STOOL: 0
COUGH: 0
CONSTIPATION: 0

## 2025-05-07 NOTE — PROGRESS NOTES
Chief Complaint   Patient presents with    3 Month Follow-Up     Patient is here today for 3 month follow up.       \"Have you been to the ER, urgent care clinic since your last visit?  Hospitalized since your last visit?\"    NO    “Have you seen or consulted any other health care providers outside of [facility] since your last visit?”    YES - When: approximately 9 days ago.  Where and Why: [facility] for Hair loss. 7 days ago. [facility] for OAB.            Click Here for Release of Records Request       There were no vitals filed for this visit.   There are no preventive care reminders to display for this patient.     The patient, Billie Nelson, identity was verified by name and .

## 2025-05-07 NOTE — PROGRESS NOTES
Subjective:      Patient ID: Billie Nelson is a 69 y.o. female.    HPI  Follow up on chronic medical problems.  She is on metformin to help with weight loss.  History of prediabetes.    Past history of htn but currently not on medication.  Has BP elevation usually only at doctor's visit.    Has been doing weight management thru [facility] weight management program.  She is please with her progress and has loss about 40 pounds since starting on the program.    Hypercholesterolemia follow up:  Has been on medication since about 2022.  Tolerating pravastatin.  Compliant w/ meds, low fat, low cholesterol diet.  Exercising some.  No muscle nor abdominal pain, no skin discoloration.  Patient fasting today.  Osteoarthritis:  Patient has osteoarthritis.  Has had arthritis in the spine.  Has had hip and right knee replacement surgery.  Now having increased pain in the left knee.  Has been getting steroid injection abut no      Symptoms onset: problem is longstanding.  Rheumatological ROS: stable, mild-to-moderate joint symptoms intermittently, reasonably well controlled by PRN meds.   Response to treatment plan: stable and intermittent.     Last mammogram in January 2024 at [facility].  Pt will schedule.     Colonoscopy: 10/21/21  repeat in 5 years.  Hx polyps and mother dx with colon cancer at age 100.      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  Active her [employer] activities.  Lives alone.      Previous teacher and coached tennis.    .  Has 2 sons ages 37 and 32 both are well.     Has some concerns about her memory.  Does not thinks she remember like she should and frequently forgets names.  However able to teach her classes at [employer] which she does for high school students to help for college preparation without issues.  She is busy with activities on her computer on a daily bases.  Sleep well.

## 2025-05-13 ENCOUNTER — OFFICE VISIT (OUTPATIENT)
Age: 70
End: 2025-05-13
Payer: MEDICARE

## 2025-05-13 VITALS
OXYGEN SATURATION: 99 % | BODY MASS INDEX: 36.54 KG/M2 | SYSTOLIC BLOOD PRESSURE: 118 MMHG | HEART RATE: 61 BPM | WEIGHT: 214 LBS | HEIGHT: 64 IN | TEMPERATURE: 98.2 F | DIASTOLIC BLOOD PRESSURE: 88 MMHG | RESPIRATION RATE: 18 BRPM

## 2025-05-13 DIAGNOSIS — E78.2 MIXED HYPERLIPIDEMIA: ICD-10-CM

## 2025-05-13 DIAGNOSIS — E66.01 CLASS 2 SEVERE OBESITY DUE TO EXCESS CALORIES WITH SERIOUS COMORBIDITY AND BODY MASS INDEX (BMI) OF 36.0 TO 36.9 IN ADULT (HCC): Primary | ICD-10-CM

## 2025-05-13 DIAGNOSIS — E66.812 CLASS 2 SEVERE OBESITY DUE TO EXCESS CALORIES WITH SERIOUS COMORBIDITY AND BODY MASS INDEX (BMI) OF 36.0 TO 36.9 IN ADULT (HCC): Primary | ICD-10-CM

## 2025-05-13 DIAGNOSIS — E11.9 TYPE 2 DIABETES MELLITUS WITHOUT COMPLICATION, WITHOUT LONG-TERM CURRENT USE OF INSULIN (HCC): ICD-10-CM

## 2025-05-13 PROCEDURE — 1126F AMNT PAIN NOTED NONE PRSNT: CPT | Performed by: FAMILY MEDICINE

## 2025-05-13 PROCEDURE — 1123F ACP DISCUSS/DSCN MKR DOCD: CPT | Performed by: FAMILY MEDICINE

## 2025-05-13 PROCEDURE — 3046F HEMOGLOBIN A1C LEVEL >9.0%: CPT | Performed by: FAMILY MEDICINE

## 2025-05-13 PROCEDURE — G8417 CALC BMI ABV UP PARAM F/U: HCPCS | Performed by: FAMILY MEDICINE

## 2025-05-13 PROCEDURE — 1036F TOBACCO NON-USER: CPT | Performed by: FAMILY MEDICINE

## 2025-05-13 PROCEDURE — 2022F DILAT RTA XM EVC RTNOPTHY: CPT | Performed by: FAMILY MEDICINE

## 2025-05-13 PROCEDURE — G8399 PT W/DXA RESULTS DOCUMENT: HCPCS | Performed by: FAMILY MEDICINE

## 2025-05-13 PROCEDURE — 99214 OFFICE O/P EST MOD 30 MIN: CPT | Performed by: FAMILY MEDICINE

## 2025-05-13 PROCEDURE — 1090F PRES/ABSN URINE INCON ASSESS: CPT | Performed by: FAMILY MEDICINE

## 2025-05-13 PROCEDURE — G8427 DOCREV CUR MEDS BY ELIG CLIN: HCPCS | Performed by: FAMILY MEDICINE

## 2025-05-13 PROCEDURE — 1159F MED LIST DOCD IN RCRD: CPT | Performed by: FAMILY MEDICINE

## 2025-05-13 PROCEDURE — 3017F COLORECTAL CA SCREEN DOC REV: CPT | Performed by: FAMILY MEDICINE

## 2025-05-13 PROCEDURE — 3044F HG A1C LEVEL LT 7.0%: CPT | Performed by: FAMILY MEDICINE

## 2025-05-13 RX ORDER — BUPROPION HYDROCHLORIDE 100 MG/1
100 TABLET, EXTENDED RELEASE ORAL 2 TIMES DAILY
Qty: 60 TABLET | Refills: 1 | Status: SHIPPED | OUTPATIENT
Start: 2025-05-13

## 2025-05-13 ASSESSMENT — PATIENT HEALTH QUESTIONNAIRE - PHQ9
1. LITTLE INTEREST OR PLEASURE IN DOING THINGS: NOT AT ALL
2. FEELING DOWN, DEPRESSED OR HOPELESS: NOT AT ALL
SUM OF ALL RESPONSES TO PHQ QUESTIONS 1-9: 0

## 2025-05-13 NOTE — PROGRESS NOTES
[facility] Progress Note:   F/up Physician Visit    CC: Weight Management      Billie Nelson is a 69 y.o. female who is here for her  f/up physician visit for the  LCD Program.      Feb 203  Now 214    She was last seen in Feb  I want her to have a sleep test  She had one in the past and she is having trouble with that test was not covered correctly and it should have been covered              5/13/2025    11:00 AM 5/13/2025    10:56 AM 5/7/2025     2:06 PM 4/21/2025     8:20 AM 3/4/2025     3:11 PM 2/11/2025    10:30 AM 1/17/2025    11:13 AM   Weight Metrics   Weight  214 lb 213 lb 12.8 oz 206 lb 214 lb 9.6 oz 203 lb 3.2 oz 212 lb   Neck (Inches) 14 in         Waist Measure Inches 43.5 in         Body Fat % 45.1 %         BMI (Calculated)  36.8 kg/m2 0 kg/m2 35.4 kg/m2 0 kg/m2 35 kg/m2 36.5 kg/m2          No data to display                   Current Outpatient Medications   Medication Sig Dispense Refill    buPROPion (WELLBUTRIN SR) 100 MG extended release tablet Take 1 tablet by mouth 2 times daily 60 tablet 1    betamethasone dipropionate 0.05 % lotion Apply topically daily      doxycycline monohydrate (MONODOX) 100 MG capsule Take 1 capsule by mouth 2 times daily      augmented betamethasone dipropionate (DIPROLENE-AF) 0.05 % ointment Apply topically 2 times daily as needed      metFORMIN (GLUCOPHAGE-XR) 500 MG extended release tablet Take 2 tablets by mouth daily 60 tablet 11    meloxicam (MOBIC) 15 MG tablet Take 1 tablet by mouth daily as needed for Pain      Apoaequorin (PREVAGEN) 10 MG CAPS Take 1 capsule by mouth daily      NONFORMULARY Inject as directed Cortisone injection in left knee every 3 months      valACYclovir (VALTREX) 500 MG tablet Take 1 tablet by mouth daily      pravastatin (PRAVACHOL) 20 MG tablet Take 1 tablet by mouth daily       No current facility-administered medications for this visit.          Participation   Did you attend clinic and class last week?

## 2025-05-13 NOTE — PROGRESS NOTES
Identified pt with two pt identifiers (name and ). Reviewed chart in preparation for visit and have obtained necessary documentation.    Billie Nelson is a 69 y.o. female  Chief Complaint   Patient presents with    Weight Management     3 month follow up     /88 (BP Site: Right Upper Arm, Patient Position: Sitting, BP Cuff Size: Small Adult) Comment: manual  Pulse 61   Temp 98.2 °F (36.8 °C) (Oral)   Resp 18   Ht 1.626 m (5' 4\")   Wt 97.1 kg (214 lb)   SpO2 99%   BMI 36.73 kg/m²     1. Have you been to the ER, urgent care clinic since your last visit?  Hospitalized since your last visit?no    2. Have you seen or consulted any other health care providers outside of the [facility] since your last visit?  Include any pap smears or colon screening. yes - PCP, for f/up visit, eye doctor, Urologist, Dermatologist    BMI - 36.6

## 2025-06-03 PROBLEM — M17.12 OSTEOARTHRITIS OF LEFT KNEE: Status: ACTIVE | Noted: 2025-06-03

## 2025-06-04 DIAGNOSIS — E66.01 CLASS 2 SEVERE OBESITY DUE TO EXCESS CALORIES WITH SERIOUS COMORBIDITY AND BODY MASS INDEX (BMI) OF 36.0 TO 36.9 IN ADULT (HCC): ICD-10-CM

## 2025-06-04 DIAGNOSIS — E66.812 CLASS 2 SEVERE OBESITY DUE TO EXCESS CALORIES WITH SERIOUS COMORBIDITY AND BODY MASS INDEX (BMI) OF 36.0 TO 36.9 IN ADULT (HCC): ICD-10-CM

## 2025-06-04 RX ORDER — BUPROPION HYDROCHLORIDE 100 MG/1
100 TABLET, EXTENDED RELEASE ORAL 2 TIMES DAILY
Qty: 180 TABLET | Refills: 1 | OUTPATIENT
Start: 2025-06-04

## 2025-07-06 DIAGNOSIS — E66.812 CLASS 2 SEVERE OBESITY DUE TO EXCESS CALORIES WITH SERIOUS COMORBIDITY AND BODY MASS INDEX (BMI) OF 36.0 TO 36.9 IN ADULT (HCC): ICD-10-CM

## 2025-07-06 DIAGNOSIS — E66.01 CLASS 2 SEVERE OBESITY DUE TO EXCESS CALORIES WITH SERIOUS COMORBIDITY AND BODY MASS INDEX (BMI) OF 36.0 TO 36.9 IN ADULT (HCC): ICD-10-CM

## 2025-07-07 NOTE — TELEPHONE ENCOUNTER
Last Rx on 5/13 for #60 with 1 RF    Last seen by Dr. Alford on 5/13    Next appointment scheduled for TOMORROW, 7/8

## 2025-07-08 ENCOUNTER — OFFICE VISIT (OUTPATIENT)
Age: 70
End: 2025-07-08

## 2025-07-08 VITALS
TEMPERATURE: 99.4 F | SYSTOLIC BLOOD PRESSURE: 136 MMHG | RESPIRATION RATE: 18 BRPM | OXYGEN SATURATION: 97 % | HEART RATE: 63 BPM | DIASTOLIC BLOOD PRESSURE: 86 MMHG | WEIGHT: 210.7 LBS | HEIGHT: 64 IN | BODY MASS INDEX: 35.97 KG/M2

## 2025-07-08 DIAGNOSIS — E11.9 TYPE 2 DIABETES MELLITUS WITHOUT COMPLICATION, WITHOUT LONG-TERM CURRENT USE OF INSULIN (HCC): ICD-10-CM

## 2025-07-08 DIAGNOSIS — E66.01 CLASS 2 SEVERE OBESITY DUE TO EXCESS CALORIES WITH SERIOUS COMORBIDITY AND BODY MASS INDEX (BMI) OF 36.0 TO 36.9 IN ADULT (HCC): Primary | ICD-10-CM

## 2025-07-08 DIAGNOSIS — E78.2 MIXED HYPERLIPIDEMIA: ICD-10-CM

## 2025-07-08 DIAGNOSIS — G47.8 UNREFRESHED BY SLEEP: ICD-10-CM

## 2025-07-08 DIAGNOSIS — E66.812 CLASS 2 SEVERE OBESITY DUE TO EXCESS CALORIES WITH SERIOUS COMORBIDITY AND BODY MASS INDEX (BMI) OF 36.0 TO 36.9 IN ADULT (HCC): Primary | ICD-10-CM

## 2025-07-08 ASSESSMENT — PATIENT HEALTH QUESTIONNAIRE - PHQ9
SUM OF ALL RESPONSES TO PHQ QUESTIONS 1-9: 0
2. FEELING DOWN, DEPRESSED OR HOPELESS: NOT AT ALL
SUM OF ALL RESPONSES TO PHQ QUESTIONS 1-9: 0
1. LITTLE INTEREST OR PLEASURE IN DOING THINGS: NOT AT ALL
SUM OF ALL RESPONSES TO PHQ QUESTIONS 1-9: 0
SUM OF ALL RESPONSES TO PHQ QUESTIONS 1-9: 0

## 2025-07-08 NOTE — PROGRESS NOTES
[facility] Progress Note:   F/up Physician Visit    CC: Weight Management      Billie Nelson is a 69 y.o. female who is here for her  f/up physician visit for the LCD Program.    May [birth year]  Now 210    Has not been able to go to the gym because of pain in the left knee  She was told she needs the knee replacement asap she is bone on bone  She has developed pain in the left lower back now too  She tried prednisone and that did not help  Po nsaids are helping a little                    7/8/2025    10:00 AM 7/8/2025     9:59 AM 7/2/2025    10:08 AM 6/23/2025     1:40 PM 5/28/2025     3:01 PM 5/13/2025    11:00 AM 5/13/2025    10:56 AM   Weight Metrics   Weight  210 lb 11.2 oz 210 lb 210 lb 210 lb  214 lb   Neck (Inches) 14 in     14 in    Waist Measure Inches 42 in     43.5 in    Body Fat % 44.7 %     45.1 %    BMI (Calculated)  36.2 kg/m2 36.1 kg/m2 36.1 kg/m2 36.1 kg/m2  36.8 kg/m2          No data to display                   Current Outpatient Medications   Medication Sig Dispense Refill    buPROPion (WELLBUTRIN SR) 100 MG extended release tablet Take 1 tablet by mouth 2 times daily 60 tablet 1    betamethasone dipropionate 0.05 % lotion Apply topically daily      doxycycline monohydrate (MONODOX) 100 MG capsule Take 1 capsule by mouth daily      augmented betamethasone dipropionate (DIPROLENE-AF) 0.05 % ointment Apply topically 2 times daily as needed      metFORMIN (GLUCOPHAGE-XR) 500 MG extended release tablet Take 2 tablets by mouth daily 60 tablet 11    meloxicam (MOBIC) 15 MG tablet Take 1 tablet by mouth daily as needed for Pain      Apoaequorin (PREVAGEN) 10 MG CAPS Take 1 capsule by mouth daily      NONFORMULARY Inject as directed Cortisone injection in left knee every 3 months      valACYclovir (VALTREX) 500 MG tablet Take 1 tablet by mouth daily      pravastatin (PRAVACHOL) 20 MG tablet Take 1 tablet by mouth daily       No current facility-administered medications for this visit.

## 2025-07-08 NOTE — PROGRESS NOTES
Identified pt with two pt identifiers (name and ). Reviewed chart in preparation for visit and have obtained necessary documentation.    Billie Nelson is a 69 y.o. female  Chief Complaint   Patient presents with    Weight Management     2 month follow up     /86 (BP Site: Right Upper Arm, Patient Position: Sitting, BP Cuff Size: Large Adult) Comment: manual  Pulse 63   Temp 99.4 °F (37.4 °C) (Oral)   Resp 18   Ht 1.626 m (5' 4\")   Wt 95.6 kg (210 lb 11.2 oz)   SpO2 97%   BMI 36.17 kg/m²     1. Have you been to the ER, urgent care clinic since your last visit?  Hospitalized since your last visit?no    2. Have you seen or consulted any other health care providers outside of the [facility] since your last visit?  Include any pap smears or colon screening. yes - Ortho Doc     BMI - 36.0

## 2025-07-09 RX ORDER — BUPROPION HYDROCHLORIDE 100 MG/1
100 TABLET, EXTENDED RELEASE ORAL 2 TIMES DAILY
Qty: 60 TABLET | Refills: 1 | OUTPATIENT
Start: 2025-07-09

## 2025-07-09 RX ORDER — BUPROPION HYDROCHLORIDE 100 MG/1
100 TABLET, EXTENDED RELEASE ORAL 2 TIMES DAILY
Qty: 60 TABLET | Refills: 1 | Status: SHIPPED | OUTPATIENT
Start: 2025-07-09

## 2025-07-14 ENCOUNTER — OFFICE VISIT (OUTPATIENT)
Age: 70
End: 2025-07-14
Payer: MEDICARE

## 2025-07-14 ENCOUNTER — TELEPHONE (OUTPATIENT)
Age: 70
End: 2025-07-14

## 2025-07-14 ENCOUNTER — CLINICAL DOCUMENTATION (OUTPATIENT)
Age: 70
End: 2025-07-14

## 2025-07-14 VITALS
BODY MASS INDEX: 36.37 KG/M2 | HEART RATE: 58 BPM | DIASTOLIC BLOOD PRESSURE: 80 MMHG | SYSTOLIC BLOOD PRESSURE: 152 MMHG | HEIGHT: 64 IN | TEMPERATURE: 97.7 F | WEIGHT: 213 LBS | OXYGEN SATURATION: 100 % | RESPIRATION RATE: 18 BRPM

## 2025-07-14 DIAGNOSIS — E78.00 HYPERCHOLESTEROLEMIA: ICD-10-CM

## 2025-07-14 DIAGNOSIS — Z01.818 PRE-OP EXAM: Primary | ICD-10-CM

## 2025-07-14 DIAGNOSIS — I10 PRIMARY HYPERTENSION: ICD-10-CM

## 2025-07-14 DIAGNOSIS — M17.12 PRIMARY OSTEOARTHRITIS OF LEFT KNEE: ICD-10-CM

## 2025-07-14 DIAGNOSIS — Z79.899 ENCOUNTER FOR LONG-TERM (CURRENT) USE OF MEDICATIONS: ICD-10-CM

## 2025-07-14 DIAGNOSIS — R73.03 PREDIABETES: ICD-10-CM

## 2025-07-14 PROCEDURE — 3017F COLORECTAL CA SCREEN DOC REV: CPT | Performed by: FAMILY MEDICINE

## 2025-07-14 PROCEDURE — 1090F PRES/ABSN URINE INCON ASSESS: CPT | Performed by: FAMILY MEDICINE

## 2025-07-14 PROCEDURE — 99214 OFFICE O/P EST MOD 30 MIN: CPT | Performed by: FAMILY MEDICINE

## 2025-07-14 PROCEDURE — 1036F TOBACCO NON-USER: CPT | Performed by: FAMILY MEDICINE

## 2025-07-14 PROCEDURE — G2211 COMPLEX E/M VISIT ADD ON: HCPCS | Performed by: FAMILY MEDICINE

## 2025-07-14 PROCEDURE — 3077F SYST BP >= 140 MM HG: CPT | Performed by: FAMILY MEDICINE

## 2025-07-14 PROCEDURE — G8399 PT W/DXA RESULTS DOCUMENT: HCPCS | Performed by: FAMILY MEDICINE

## 2025-07-14 PROCEDURE — G8417 CALC BMI ABV UP PARAM F/U: HCPCS | Performed by: FAMILY MEDICINE

## 2025-07-14 PROCEDURE — 1159F MED LIST DOCD IN RCRD: CPT | Performed by: FAMILY MEDICINE

## 2025-07-14 PROCEDURE — 1160F RVW MEDS BY RX/DR IN RCRD: CPT | Performed by: FAMILY MEDICINE

## 2025-07-14 PROCEDURE — 3079F DIAST BP 80-89 MM HG: CPT | Performed by: FAMILY MEDICINE

## 2025-07-14 PROCEDURE — 1123F ACP DISCUSS/DSCN MKR DOCD: CPT | Performed by: FAMILY MEDICINE

## 2025-07-14 PROCEDURE — 1126F AMNT PAIN NOTED NONE PRSNT: CPT | Performed by: FAMILY MEDICINE

## 2025-07-14 PROCEDURE — G8427 DOCREV CUR MEDS BY ELIG CLIN: HCPCS | Performed by: FAMILY MEDICINE

## 2025-07-14 RX ORDER — AMLODIPINE BESYLATE 2.5 MG/1
2.5 TABLET ORAL DAILY
Qty: 30 TABLET | Refills: 3 | Status: SHIPPED | OUTPATIENT
Start: 2025-07-14

## 2025-07-14 ASSESSMENT — PATIENT HEALTH QUESTIONNAIRE - PHQ9
SUM OF ALL RESPONSES TO PHQ QUESTIONS 1-9: 0
SUM OF ALL RESPONSES TO PHQ QUESTIONS 1-9: 0
1. LITTLE INTEREST OR PLEASURE IN DOING THINGS: NOT AT ALL
SUM OF ALL RESPONSES TO PHQ QUESTIONS 1-9: 0
2. FEELING DOWN, DEPRESSED OR HOPELESS: NOT AT ALL
SUM OF ALL RESPONSES TO PHQ QUESTIONS 1-9: 0

## 2025-07-14 ASSESSMENT — ENCOUNTER SYMPTOMS
COUGH: 0
BLOOD IN STOOL: 0
ABDOMINAL PAIN: 0
CONSTIPATION: 0
RHINORRHEA: 0
SHORTNESS OF BREATH: 0
CHEST TIGHTNESS: 0

## 2025-07-14 NOTE — PROGRESS NOTES
Subjective:      Patient ID: Billie Nelson is a 69 y.o. female.    HPI  Pre-op for left TKR.    She is on metformin and bupropion to help with weight loss.    Has been doing weight management thru [facility] weight management program.  She is please with her progress and has loss about 40 pounds since starting on the program.    Cardiovascular Review:  The patient has hypertension.  Past history of htn but currently not on medication.  Has BP elevation usually only at doctor's visit but she has noted more elevation in her BP over the past several months.    Diet and Lifestyle: generally follows a low fat low cholesterol diet, generally follows a low sodium diet, exercises sporadically, nonsmoker    Pertinent ROS:  no TIA's, no chest pain on exertion, no dyspnea on exertion, no swelling of ankles, no palpitations, no headache or dizziness.      Pre-diabetes reveiw:  She has IGT.  Diabetic ROS - further diabetic ROS: no polyuria or polydipsia, no chest pain, dyspnea or TIA's, no numbness, tingling or pain in extremities, no unusual visual symptoms.   Lab review: orders written for new lab studies as appropriate; see orders.     Hypercholesterolemia follow up:  Has been on medication since about 2022.  Tolerating pravastatin.  Compliant w/ meds, low fat, low cholesterol diet.  Exercising some.  No muscle nor abdominal pain, no skin discoloration.  Patient fasting today.  Osteoarthritis:  Patient has osteoarthritis.  Has had arthritis in the spine.  Has had hip and right knee replacement surgery.  Now having increased pain in the left knee.      Symptoms onset: problem is longstanding.  Rheumatological ROS: stable, mild-to-moderate joint symptoms intermittently, reasonably well controlled by PRN meds.   Response to treatment plan: stable and intermittent.     Last mammogram in January 2025 at [facility].       Colonoscopy: 10/21/21  repeat in 5 years.  Hx polyps and mother dx with colon cancer at age 100.      Retired from

## 2025-07-14 NOTE — PROGRESS NOTES
Chief Complaint   Patient presents with    Pre-op Exam     Patient is having knee surgery on 2025       \"Have you been to the ER, urgent care clinic since your last visit?  Hospitalized since your last visit?\"    NO    “Have you seen or consulted any other health care providers outside of [facility] since your last visit?”    NO        Vitals:    25 1308 25 1313   BP: (!) 161/88 (!) 159/91   BP Site: Left Upper Arm Left Upper Arm   Patient Position: Sitting Sitting   BP Cuff Size: Large Adult Large Adult   Pulse: 58    Resp: 18    Temp: 97.7 °F (36.5 °C)    TempSrc: Temporal    SpO2: 100%    Weight: 96.6 kg (213 lb)    Height: 1.626 m (5' 4\")         Click Here for Release of Records Request      There were no vitals filed for this visit.    There are no preventive care reminders to display for this patient.     The patient, Billie Nelson, identity was verified by name and .

## 2025-07-14 NOTE — TELEPHONE ENCOUNTER
[facility]  called office to report  no form needed  for pre-op, office note needs to state that Patient is cleared for Surgery.

## 2025-07-14 NOTE — PROGRESS NOTES
Called and left a message with the answering service to fax the pt pre-op form because she is in the office and her pre-op form was not in the folder that was given to her.  She stated she would have Loreta fax it to our office today.

## 2025-07-14 NOTE — TELEPHONE ENCOUNTER
FYI:  [facility]  called office to report  no form needed  for pre-op, office note needs to state that Patient is cleared for Surgery.

## 2025-07-15 ENCOUNTER — HOSPITAL ENCOUNTER (OUTPATIENT)
Facility: HOSPITAL | Age: 70
Discharge: HOME | End: 2025-07-18
Payer: MEDICARE

## 2025-07-15 VITALS
WEIGHT: 209.4 LBS | HEIGHT: 64 IN | BODY MASS INDEX: 35.75 KG/M2 | RESPIRATION RATE: 20 BRPM | SYSTOLIC BLOOD PRESSURE: 147 MMHG | DIASTOLIC BLOOD PRESSURE: 82 MMHG | HEART RATE: 66 BPM | TEMPERATURE: 98 F

## 2025-07-15 LAB
ANION GAP SERPL CALC-SCNC: 4 MMOL/L (ref 2–12)
APPEARANCE UR: CLEAR
BACTERIA URNS QL MICRO: NEGATIVE /HPF
BASOPHILS # BLD: 0.03 K/UL (ref 0–0.1)
BASOPHILS NFR BLD: 0.5 % (ref 0–1)
BILIRUB UR QL: NEGATIVE
BUN SERPL-MCNC: 21 MG/DL (ref 6–20)
BUN/CREAT SERPL: 21 (ref 12–20)
CALCIUM SERPL-MCNC: 9.9 MG/DL (ref 8.5–10.1)
CHLORIDE SERPL-SCNC: 107 MMOL/L (ref 97–108)
CO2 SERPL-SCNC: 29 MMOL/L (ref 21–32)
COLOR UR: ABNORMAL
CREAT SERPL-MCNC: 1.01 MG/DL (ref 0.55–1.02)
DIFFERENTIAL METHOD BLD: NORMAL
EKG ATRIAL RATE: 55 BPM
EKG DIAGNOSIS: NORMAL
EKG P AXIS: 41 DEGREES
EKG P-R INTERVAL: 186 MS
EKG Q-T INTERVAL: 416 MS
EKG QRS DURATION: 94 MS
EKG QTC CALCULATION (BAZETT): 397 MS
EKG R AXIS: -8 DEGREES
EKG T AXIS: 39 DEGREES
EKG VENTRICULAR RATE: 55 BPM
EOSINOPHIL # BLD: 0.09 K/UL (ref 0–0.4)
EOSINOPHIL NFR BLD: 1.4 % (ref 0–7)
EPITH CASTS URNS QL MICRO: ABNORMAL /LPF
ERYTHROCYTE [DISTWIDTH] IN BLOOD BY AUTOMATED COUNT: 14.4 % (ref 11.5–14.5)
EST. AVERAGE GLUCOSE BLD GHB EST-MCNC: 111 MG/DL
GLUCOSE SERPL-MCNC: 91 MG/DL (ref 65–100)
GLUCOSE UR STRIP.AUTO-MCNC: NEGATIVE MG/DL
HBA1C MFR BLD: 5.5 % (ref 4–5.6)
HCT VFR BLD AUTO: 39.9 % (ref 35–47)
HGB BLD-MCNC: 12.9 G/DL (ref 11.5–16)
HGB UR QL STRIP: NEGATIVE
HYALINE CASTS URNS QL MICRO: ABNORMAL /LPF (ref 0–5)
IMM GRANULOCYTES # BLD AUTO: 0.03 K/UL (ref 0–0.04)
IMM GRANULOCYTES NFR BLD AUTO: 0.5 % (ref 0–0.5)
INR PPP: 1 (ref 0.9–1.1)
KETONES UR QL STRIP.AUTO: NEGATIVE MG/DL
LEUKOCYTE ESTERASE UR QL STRIP.AUTO: ABNORMAL
LYMPHOCYTES # BLD: 1.67 K/UL (ref 0.8–3.5)
LYMPHOCYTES NFR BLD: 26.7 % (ref 12–49)
MCH RBC QN AUTO: 31.9 PG (ref 26–34)
MCHC RBC AUTO-ENTMCNC: 32.3 G/DL (ref 30–36.5)
MCV RBC AUTO: 98.5 FL (ref 80–99)
MONOCYTES # BLD: 0.58 K/UL (ref 0–1)
MONOCYTES NFR BLD: 9.3 % (ref 5–13)
NEUTS SEG # BLD: 3.86 K/UL (ref 1.8–8)
NEUTS SEG NFR BLD: 61.6 % (ref 32–75)
NITRITE UR QL STRIP.AUTO: NEGATIVE
NRBC # BLD: 0 K/UL (ref 0–0.01)
NRBC BLD-RTO: 0 PER 100 WBC
PH UR STRIP: 8 (ref 5–8)
PLATELET # BLD AUTO: 264 K/UL (ref 150–400)
PMV BLD AUTO: 11.4 FL (ref 8.9–12.9)
POTASSIUM SERPL-SCNC: 4.4 MMOL/L (ref 3.5–5.1)
PROT UR STRIP-MCNC: NEGATIVE MG/DL
PROTHROMBIN TIME: 10.6 SEC (ref 9.2–11.2)
RBC # BLD AUTO: 4.05 M/UL (ref 3.8–5.2)
RBC #/AREA URNS HPF: ABNORMAL /HPF (ref 0–5)
SODIUM SERPL-SCNC: 140 MMOL/L (ref 136–145)
SP GR UR REFRACTOMETRY: 1.01 (ref 1–1.03)
URINE CULTURE IF INDICATED: ABNORMAL
UROBILINOGEN UR QL STRIP.AUTO: 0.2 EU/DL (ref 0.2–1)
WBC # BLD AUTO: 6.3 K/UL (ref 3.6–11)
WBC URNS QL MICRO: ABNORMAL /HPF (ref 0–4)

## 2025-07-15 PROCEDURE — 86901 BLOOD TYPING SEROLOGIC RH(D): CPT

## 2025-07-15 PROCEDURE — 93005 ELECTROCARDIOGRAM TRACING: CPT | Performed by: ORTHOPAEDIC SURGERY

## 2025-07-15 PROCEDURE — 81001 URINALYSIS AUTO W/SCOPE: CPT

## 2025-07-15 PROCEDURE — 85610 PROTHROMBIN TIME: CPT

## 2025-07-15 PROCEDURE — 85025 COMPLETE CBC W/AUTO DIFF WBC: CPT

## 2025-07-15 PROCEDURE — 80048 BASIC METABOLIC PNL TOTAL CA: CPT

## 2025-07-15 PROCEDURE — 86850 RBC ANTIBODY SCREEN: CPT

## 2025-07-15 PROCEDURE — 86900 BLOOD TYPING SEROLOGIC ABO: CPT

## 2025-07-15 PROCEDURE — 83036 HEMOGLOBIN GLYCOSYLATED A1C: CPT

## 2025-07-15 RX ORDER — AMOXICILLIN 500 MG/1
500 CAPSULE ORAL
COMMUNITY

## 2025-07-15 ASSESSMENT — PROMIS GLOBAL HEALTH SCALE
IN GENERAL, WOULD YOU SAY YOUR HEALTH IS...[ON A SCALE OF 1 (POOR) TO 5 (EXCELLENT)]: GOOD
HOW IS THE PROMIS V1.1 BEING ADMINISTERED?: PAPER
SUM OF RESPONSES TO QUESTIONS 3, 6, 7, & 8: 15
IN GENERAL, HOW WOULD YOU RATE YOUR MENTAL HEALTH, INCLUDING YOUR MOOD AND YOUR ABILITY TO THINK [ON A SCALE OF 1 (POOR) TO 5 (EXCELLENT)]?: EXCELLENT
IN GENERAL, HOW WOULD YOU RATE YOUR PHYSICAL HEALTH [ON A SCALE OF 1 (POOR) TO 5 (EXCELLENT)]?: GOOD
IN THE PAST 7 DAYS, HOW OFTEN HAVE YOU BEEN BOTHERED BY EMOTIONAL PROBLEMS, SUCH AS FEELING ANXIOUS, DEPRESSED, OR IRRITABLE [ON A SCALE FROM 1 (NEVER) TO 5 (ALWAYS)]?: NEVER
SUM OF RESPONSES TO QUESTIONS 2, 4, 5, & 10: 18
TO WHAT EXTENT ARE YOU ABLE TO CARRY OUT YOUR EVERYDAY PHYSICAL ACTIVITIES SUCH AS WALKING, CLIMBING STAIRS, CARRYING GROCERIES, OR MOVING A CHAIR [ON A SCALE OF 1 (NOT AT ALL) TO 5 (COMPLETELY)]?: MOSTLY
IN GENERAL, PLEASE RATE HOW WELL YOU CARRY OUT YOUR USUAL SOCIAL ACTIVITIES (INCLUDES ACTIVITIES AT HOME, AT WORK, AND IN YOUR COMMUNITY, AND RESPONSIBILITIES AS A PARENT, CHILD, SPOUSE, EMPLOYEE, FRIEND, ETC) [ON A SCALE OF 1 (POOR) TO 5 (EXCELLENT)]?: EXCELLENT
WHO IS THE PERSON COMPLETING THE PROMIS V1.1 SURVEY?: SELF
IN THE PAST 7 DAYS, HOW WOULD YOU RATE YOUR PAIN ON AVERAGE [ON A SCALE FROM 0 (NO PAIN) TO 10 (WORST IMAGINABLE PAIN)]?: 3
IN GENERAL, HOW WOULD YOU RATE YOUR SATISFACTION WITH YOUR SOCIAL ACTIVITIES AND RELATIONSHIPS [ON A SCALE OF 1 (POOR) TO 5 (EXCELLENT)]?: VERY GOOD
IN GENERAL, WOULD YOU SAY YOUR QUALITY OF LIFE IS...[ON A SCALE OF 1 (POOR) TO 5 (EXCELLENT)]: VERY GOOD

## 2025-07-15 ASSESSMENT — KOOS JR
BENDING TO THE FLOOR TO PICK UP OBJECT: MILD
STRAIGHTENING KNEE FULLY: MILD
STANDING UPRIGHT: MILD
HOW SEVERE IS YOUR KNEE STIFFNESS AFTER FIRST WAKING IN MORNING: MILD
RISING FROM SITTING: MILD
TWISING OR PIVOTING ON KNEE: MILD
GOING UP OR DOWN STAIRS: MILD
KOOS JR TOTAL INTERVAL SCORE: 68.284

## 2025-07-15 NOTE — PERIOP NOTE
[address] [address]     MAIN PRE OP            [phone]                                                                                AMBULATORY PRE OP          [phone]    PRE-ADMISSION TESTING    [phone]     Surgery Date:  7-29-25       [facility] staff will call you between 4 and 7pm the day before your surgery with your arrival time. (If your surgery is on a Monday, we will call you the Friday before.)    Call [phone] after 7pm Monday-Friday if you did not receive this call.    INSTRUCTIONS BEFORE YOUR SURGERY   When You  Arrive Arrive at [facility] Patient Access on 1st floor the day of your surgery.  Have your insurance card, photo ID,living will/advanced directive/POA (if applicable),  and any copayment (if needed)   Food   and   Drink NO solid food after midnight the night before surgery. You can drink clear liquids from midnight until ONE hour prior to your arrival at the hospital on the day of your surgery.     Clear liquids include:  Water  Apple juice (no sediment)  Carbonated beverages  Black coffee(no cream/milk)  Tea(no cream/milk)  Gatorade    No alcohol (beer, wine, liquor) or marijuana (smoking) 24 hours prior to surgery.   No marijuana edibles for 3 days prior to surgery.    Stop smoking cigarettes 14 days before surgery (helps w/healing and breathing).   Medications to   TAKE   Morning of Surgery MEDICATIONS TO TAKE THE MORNING OF SURGERY WITH A SIP OF WATER: AMLODIPINE, BUPROPION, PRAVASTATIN DOXYCYCLINE    You may take these medications, IF NEEDED, the morning of surgery: NONE    Ask your surgeon/prescribing doctor for instructions on taking or stopping these medications prior to surgery: NONE   Medications to STOP  before surgery Non-Steroidal anti-inflammatory Drugs (NSAID's): for example, Diclofenac(Voltaren),  Ibuprofen (Advil, Motrin), Naproxen (Aleve) 3 days    STOP all herbal supplements and

## 2025-07-16 LAB
ABO + RH BLD: NORMAL
BACTERIA SPEC CULT: NORMAL
BACTERIA SPEC CULT: NORMAL
BLOOD GROUP ANTIBODIES SERPL: NORMAL
SERVICE CMNT-IMP: NORMAL
SPECIMEN EXP DATE BLD: NORMAL

## 2025-07-16 NOTE — PERIOP NOTE
PAT Nurse Practitioner   Pre-Operative Chart Review/Assessment:-ORTHOPEDIC                Patient Name:  Billie Nelson                                                           Age:   69 y.o.    :  1955     Today's Date:  2025     Date of PAT:   7/15/25      Date of Surgery:    25      Procedure(s):  Left Total Knee Arthroplasty     Anesthesia:   Spinal     Surgeon:   Dr. Mccall                       PLAN:      1)  PCP:  Oma Krause MD      2)  Cardiac Risk Assessment:  EKG and METs reviewed. No further cardiac evaluation requested. PAT EKG showed sinus melodie; HR-55 and LVH. No acute changes from previous tracing.         3)  Program for Diabetes Health Referral:  Not indicated. A1c-5.5      4)  Sleep Apnea evaluation:   Pt has sleep study scheduled for 7/15/25.       5) Treatment for MRSA/Staph Aureus:  Neg      6) Discharge Plan:  Home w/ niece. Pt lives alone.      7) Skin: Denies open wounds, cuts, sores, rashes or other areas of concern in PAT assessment.      8) Additional Concerns:  HTN      9) Cardiac/Diabetic Medication Recommendations Prior to Surgery:  hold metformin DOS and take amlodipine DOS      Additional Orders for Day of Surgery:  none      Records Scanned in Epic:   None              Vital Signs:        Vitals:    07/15/25 0806   BP: (!) 147/82   Pulse: 66   Resp: 20   Temp: 98 °F (36.7 °C)             Body mass index is 35.94 kg/m².         ____________________________________________  PAST MEDICAL HISTORY  Past Medical History:   Diagnosis Date    Arthritis     BOTH HIPS PAINFUL    Gore's cyst     Bursitis     Chronic pain     GERD (gastroesophageal reflux disease)     High cholesterol     Hypertension     Memory disorder     Short term memory loss    Obesity     Osteoarthritis     Pre-diabetes     Sleep apnea     has never been on CPAP, test inclusive    Sleep difficulties     Spinal stenosis, lumbar       ____________________________________________  PAST

## 2025-07-23 NOTE — PROGRESS NOTES
KNEE DISABILITY OSTEOARTHRITIS AND OUTCOME SCORE    Stiffness - The following question concerns the amount of joing stiffness you have experienced during the last week in your knee. Stiffness is a sensation of restriction or slowness in the ease with which you move your knee joint.  How severe is your knee stiffness after first wakening in the morning?: 1        Pain - What amount of knee pain have you experienced the last week during the following activities?  Twisting/pivoting on your knee: 1  Straightening knee fully: 1  Going up or down stairs: 1  Standing upright: 1        Function - Please indicate the degree of difficulty you have experienced in the last week due to your knee.  Rising from sittin  Bending to floor/ an object: 1        Raw Score  Jr FRANKIE. Knee Survey Score: 7  KOOS JR Total Interval Score (0-100 Scale): 68.284      PROMIS Questions    In general, would you say your health is:: 3    In general, would you say your quality of life is:: 4    In general, how would you rate your physical health?: 3    In general, how would you rate your mental health, including your mood and your ability to think?: 5    To what extent are you able to carry out your everyday physical activities such as walking, climbing stairs, carrying groceries, or moving a chair?: 4    In the past 7 days how often have you been bothered by emotional problems such as feeling anxious, depressed or irritable?: 5    In the past 7 days how would you rate your fatigue on average?: 5    In the past 7 days how would you rate your pain on average?: 3    In general, please rate how well you carry out your usual social activities and roles. (This includes activities at home, at work and in your community, and responsibilities as a parent, child, spouse, employee, friend, etc.): 5    In general, how would you rate your satisfaction with your social activities and relationships?: 4    How comfortable are you filling out medical

## 2025-07-28 ENCOUNTER — ANESTHESIA EVENT (OUTPATIENT)
Facility: HOSPITAL | Age: 70
End: 2025-07-28
Payer: MEDICARE

## 2025-07-29 ENCOUNTER — HOSPITAL ENCOUNTER (OUTPATIENT)
Facility: HOSPITAL | Age: 70
Setting detail: OBSERVATION
Discharge: HOME | End: 2025-07-30
Attending: ORTHOPAEDIC SURGERY | Admitting: ORTHOPAEDIC SURGERY
Payer: MEDICARE

## 2025-07-29 ENCOUNTER — ANESTHESIA (OUTPATIENT)
Facility: HOSPITAL | Age: 70
End: 2025-07-29
Payer: MEDICARE

## 2025-07-29 DIAGNOSIS — Z96.652 STATUS POST LEFT KNEE REPLACEMENT: Primary | ICD-10-CM

## 2025-07-29 DIAGNOSIS — E66.812 CLASS 2 SEVERE OBESITY DUE TO EXCESS CALORIES WITH SERIOUS COMORBIDITY AND BODY MASS INDEX (BMI) OF 36.0 TO 36.9 IN ADULT (HCC): ICD-10-CM

## 2025-07-29 DIAGNOSIS — E66.01 CLASS 2 SEVERE OBESITY DUE TO EXCESS CALORIES WITH SERIOUS COMORBIDITY AND BODY MASS INDEX (BMI) OF 36.0 TO 36.9 IN ADULT (HCC): ICD-10-CM

## 2025-07-29 PROBLEM — M17.12 PRIMARY OSTEOARTHRITIS OF LEFT KNEE: Status: ACTIVE | Noted: 2025-07-29

## 2025-07-29 LAB
GLUCOSE BLD STRIP.AUTO-MCNC: 92 MG/DL (ref 65–117)
SERVICE CMNT-IMP: NORMAL

## 2025-07-29 PROCEDURE — 6370000000 HC RX 637 (ALT 250 FOR IP): Performed by: PHYSICIAN ASSISTANT

## 2025-07-29 PROCEDURE — 6370000000 HC RX 637 (ALT 250 FOR IP): Performed by: ANESTHESIOLOGY

## 2025-07-29 PROCEDURE — 6360000002 HC RX W HCPCS: Performed by: ANESTHESIOLOGY

## 2025-07-29 PROCEDURE — G0378 HOSPITAL OBSERVATION PER HR: HCPCS

## 2025-07-29 PROCEDURE — 2580000003 HC RX 258: Performed by: PHYSICIAN ASSISTANT

## 2025-07-29 PROCEDURE — C1713 ANCHOR/SCREW BN/BN,TIS/BN: HCPCS | Performed by: ORTHOPAEDIC SURGERY

## 2025-07-29 PROCEDURE — 82962 GLUCOSE BLOOD TEST: CPT

## 2025-07-29 PROCEDURE — 2500000003 HC RX 250 WO HCPCS: Performed by: PHYSICIAN ASSISTANT

## 2025-07-29 PROCEDURE — 6370000000 HC RX 637 (ALT 250 FOR IP): Performed by: ORTHOPAEDIC SURGERY

## 2025-07-29 PROCEDURE — 7100000001 HC PACU RECOVERY - ADDTL 15 MIN: Performed by: ORTHOPAEDIC SURGERY

## 2025-07-29 PROCEDURE — 3600000014 HC SURGERY LEVEL 4 ADDTL 15MIN: Performed by: ORTHOPAEDIC SURGERY

## 2025-07-29 PROCEDURE — 3700000001 HC ADD 15 MINUTES (ANESTHESIA): Performed by: ORTHOPAEDIC SURGERY

## 2025-07-29 PROCEDURE — 64447 NJX AA&/STRD FEMORAL NRV IMG: CPT | Performed by: ANESTHESIOLOGY

## 2025-07-29 PROCEDURE — 6360000002 HC RX W HCPCS: Performed by: PHYSICIAN ASSISTANT

## 2025-07-29 PROCEDURE — 2720000010 HC SURG SUPPLY STERILE: Performed by: ORTHOPAEDIC SURGERY

## 2025-07-29 PROCEDURE — 3600000004 HC SURGERY LEVEL 4 BASE: Performed by: ORTHOPAEDIC SURGERY

## 2025-07-29 PROCEDURE — 2580000003 HC RX 258: Performed by: NURSE ANESTHETIST, CERTIFIED REGISTERED

## 2025-07-29 PROCEDURE — 3700000000 HC ANESTHESIA ATTENDED CARE: Performed by: ORTHOPAEDIC SURGERY

## 2025-07-29 PROCEDURE — 2709999900 HC NON-CHARGEABLE SUPPLY: Performed by: ORTHOPAEDIC SURGERY

## 2025-07-29 PROCEDURE — 27447 TOTAL KNEE ARTHROPLASTY: CPT | Performed by: ORTHOPAEDIC SURGERY

## 2025-07-29 PROCEDURE — 7100000000 HC PACU RECOVERY - FIRST 15 MIN: Performed by: ORTHOPAEDIC SURGERY

## 2025-07-29 PROCEDURE — 20985 CPTR-ASST DIR MS PX: CPT | Performed by: ORTHOPAEDIC SURGERY

## 2025-07-29 PROCEDURE — 27447 TOTAL KNEE ARTHROPLASTY: CPT | Performed by: PHYSICIAN ASSISTANT

## 2025-07-29 PROCEDURE — 6360000002 HC RX W HCPCS: Performed by: NURSE ANESTHETIST, CERTIFIED REGISTERED

## 2025-07-29 PROCEDURE — 2580000003 HC RX 258: Performed by: ANESTHESIOLOGY

## 2025-07-29 PROCEDURE — C1776 JOINT DEVICE (IMPLANTABLE): HCPCS | Performed by: ORTHOPAEDIC SURGERY

## 2025-07-29 DEVICE — CEMENT BNE MED VISC 80 GM GENTAMICIN PALACOS MV+G PRO: Type: IMPLANTABLE DEVICE | Site: KNEE | Status: FUNCTIONAL

## 2025-07-29 DEVICE — EMPOWR 3D KNEETM INS, 7L 14MM, VE
Type: IMPLANTABLE DEVICE | Site: KNEE | Status: FUNCTIONAL
Brand: DJO SURGICAL

## 2025-07-29 DEVICE — DJO EMPOWR KNEETM, FIN BP, NP, 7L
Type: IMPLANTABLE DEVICE | Site: KNEE | Status: FUNCTIONAL
Brand: DJO SURGICAL

## 2025-07-29 DEVICE — DOMED TRI-PEG PATELLA, 32X8MM, E-PLUS
Type: IMPLANTABLE DEVICE | Site: KNEE | Status: FUNCTIONAL
Brand: DJO SURGICAL

## 2025-07-29 DEVICE — IMPL CAPPED KNEE K1 TOTAL/HEMI STD CEMENTED DJO: Type: IMPLANTABLE DEVICE | Status: FUNCTIONAL

## 2025-07-29 DEVICE — EMPOWR 3D KNEETM FEMUR, NP, 7L
Type: IMPLANTABLE DEVICE | Site: KNEE | Status: FUNCTIONAL
Brand: DJO SURGICAL

## 2025-07-29 RX ORDER — SODIUM CHLORIDE 0.9 % (FLUSH) 0.9 %
5-40 SYRINGE (ML) INJECTION PRN
Status: DISCONTINUED | OUTPATIENT
Start: 2025-07-29 | End: 2025-07-29 | Stop reason: HOSPADM

## 2025-07-29 RX ORDER — FENTANYL CITRATE 50 UG/ML
100 INJECTION, SOLUTION INTRAMUSCULAR; INTRAVENOUS
Status: DISCONTINUED | OUTPATIENT
Start: 2025-07-29 | End: 2025-07-29 | Stop reason: HOSPADM

## 2025-07-29 RX ORDER — SODIUM CHLORIDE 9 MG/ML
INJECTION, SOLUTION INTRAVENOUS CONTINUOUS
Status: DISCONTINUED | OUTPATIENT
Start: 2025-07-29 | End: 2025-07-30 | Stop reason: HOSPADM

## 2025-07-29 RX ORDER — MIDAZOLAM HYDROCHLORIDE 2 MG/2ML
2 INJECTION, SOLUTION INTRAMUSCULAR; INTRAVENOUS ONCE
Status: COMPLETED | OUTPATIENT
Start: 2025-07-29 | End: 2025-07-29

## 2025-07-29 RX ORDER — ROPIVACAINE HYDROCHLORIDE 5 MG/ML
30 INJECTION, SOLUTION EPIDURAL; INFILTRATION; PERINEURAL ONCE
Status: DISCONTINUED | OUTPATIENT
Start: 2025-07-29 | End: 2025-07-29 | Stop reason: HOSPADM

## 2025-07-29 RX ORDER — ONDANSETRON 2 MG/ML
4 INJECTION INTRAMUSCULAR; INTRAVENOUS
Status: DISCONTINUED | OUTPATIENT
Start: 2025-07-29 | End: 2025-07-29 | Stop reason: HOSPADM

## 2025-07-29 RX ORDER — PROCHLORPERAZINE EDISYLATE 5 MG/ML
5 INJECTION INTRAMUSCULAR; INTRAVENOUS
Status: DISCONTINUED | OUTPATIENT
Start: 2025-07-29 | End: 2025-07-29 | Stop reason: HOSPADM

## 2025-07-29 RX ORDER — HYDROXYZINE HYDROCHLORIDE 10 MG/1
10 TABLET, FILM COATED ORAL EVERY 8 HOURS PRN
Status: DISCONTINUED | OUTPATIENT
Start: 2025-07-29 | End: 2025-07-30 | Stop reason: HOSPADM

## 2025-07-29 RX ORDER — ASPIRIN 81 MG/1
81 TABLET ORAL 2 TIMES DAILY
Status: DISCONTINUED | OUTPATIENT
Start: 2025-07-29 | End: 2025-07-30 | Stop reason: HOSPADM

## 2025-07-29 RX ORDER — SODIUM CHLORIDE 0.9 % (FLUSH) 0.9 %
5-40 SYRINGE (ML) INJECTION EVERY 12 HOURS SCHEDULED
Status: DISCONTINUED | OUTPATIENT
Start: 2025-07-29 | End: 2025-07-29 | Stop reason: HOSPADM

## 2025-07-29 RX ORDER — HYDROMORPHONE HYDROCHLORIDE 1 MG/ML
0.5 INJECTION, SOLUTION INTRAMUSCULAR; INTRAVENOUS; SUBCUTANEOUS EVERY 5 MIN PRN
Status: COMPLETED | OUTPATIENT
Start: 2025-07-29 | End: 2025-07-29

## 2025-07-29 RX ORDER — SODIUM CHLORIDE 0.9 % (FLUSH) 0.9 %
5-40 SYRINGE (ML) INJECTION PRN
Status: DISCONTINUED | OUTPATIENT
Start: 2025-07-29 | End: 2025-07-30 | Stop reason: HOSPADM

## 2025-07-29 RX ORDER — LIDOCAINE HYDROCHLORIDE 10 MG/ML
1 INJECTION, SOLUTION EPIDURAL; INFILTRATION; INTRACAUDAL; PERINEURAL
Status: DISCONTINUED | OUTPATIENT
Start: 2025-07-29 | End: 2025-07-29 | Stop reason: HOSPADM

## 2025-07-29 RX ORDER — SODIUM CHLORIDE 9 MG/ML
INJECTION, SOLUTION INTRAVENOUS PRN
Status: DISCONTINUED | OUTPATIENT
Start: 2025-07-29 | End: 2025-07-29 | Stop reason: HOSPADM

## 2025-07-29 RX ORDER — PRAVASTATIN SODIUM 10 MG
20 TABLET ORAL DAILY
Status: DISCONTINUED | OUTPATIENT
Start: 2025-07-30 | End: 2025-07-30 | Stop reason: HOSPADM

## 2025-07-29 RX ORDER — ACETAMINOPHEN 500 MG
1000 TABLET ORAL ONCE
Status: COMPLETED | OUTPATIENT
Start: 2025-07-29 | End: 2025-07-29

## 2025-07-29 RX ORDER — OXYCODONE HYDROCHLORIDE 5 MG/1
5 TABLET ORAL
Status: COMPLETED | OUTPATIENT
Start: 2025-07-29 | End: 2025-07-29

## 2025-07-29 RX ORDER — FENTANYL CITRATE 50 UG/ML
25 INJECTION, SOLUTION INTRAMUSCULAR; INTRAVENOUS EVERY 5 MIN PRN
Status: DISCONTINUED | OUTPATIENT
Start: 2025-07-29 | End: 2025-07-29 | Stop reason: HOSPADM

## 2025-07-29 RX ORDER — 0.9 % SODIUM CHLORIDE 0.9 %
500 INTRAVENOUS SOLUTION INTRAVENOUS PRN
Status: DISCONTINUED | OUTPATIENT
Start: 2025-07-29 | End: 2025-07-30 | Stop reason: HOSPADM

## 2025-07-29 RX ORDER — ONDANSETRON 4 MG/1
4 TABLET, ORALLY DISINTEGRATING ORAL EVERY 8 HOURS PRN
Status: DISCONTINUED | OUTPATIENT
Start: 2025-07-29 | End: 2025-07-30 | Stop reason: HOSPADM

## 2025-07-29 RX ORDER — FENTANYL CITRATE 50 UG/ML
100 INJECTION, SOLUTION INTRAMUSCULAR; INTRAVENOUS ONCE
Refills: 0 | Status: COMPLETED | OUTPATIENT
Start: 2025-07-29 | End: 2025-07-29

## 2025-07-29 RX ORDER — SODIUM CHLORIDE, SODIUM LACTATE, POTASSIUM CHLORIDE, CALCIUM CHLORIDE 600; 310; 30; 20 MG/100ML; MG/100ML; MG/100ML; MG/100ML
INJECTION, SOLUTION INTRAVENOUS CONTINUOUS
Status: DISCONTINUED | OUTPATIENT
Start: 2025-07-29 | End: 2025-07-29 | Stop reason: HOSPADM

## 2025-07-29 RX ORDER — ACETAMINOPHEN 500 MG
500 TABLET ORAL
Status: DISCONTINUED | OUTPATIENT
Start: 2025-07-29 | End: 2025-07-30 | Stop reason: HOSPADM

## 2025-07-29 RX ORDER — ROPIVACAINE HYDROCHLORIDE 5 MG/ML
INJECTION, SOLUTION EPIDURAL; INFILTRATION; PERINEURAL
Status: COMPLETED | OUTPATIENT
Start: 2025-07-29 | End: 2025-07-29

## 2025-07-29 RX ORDER — BUPROPION HYDROCHLORIDE 100 MG/1
100 TABLET, EXTENDED RELEASE ORAL 2 TIMES DAILY
Status: DISCONTINUED | OUTPATIENT
Start: 2025-07-29 | End: 2025-07-30

## 2025-07-29 RX ORDER — DEXAMETHASONE SODIUM PHOSPHATE 4 MG/ML
INJECTION, SOLUTION INTRA-ARTICULAR; INTRALESIONAL; INTRAMUSCULAR; INTRAVENOUS; SOFT TISSUE
Status: DISCONTINUED | OUTPATIENT
Start: 2025-07-29 | End: 2025-07-29 | Stop reason: SDUPTHER

## 2025-07-29 RX ORDER — SODIUM CHLORIDE 9 MG/ML
INJECTION, SOLUTION INTRAVENOUS PRN
Status: DISCONTINUED | OUTPATIENT
Start: 2025-07-29 | End: 2025-07-30 | Stop reason: HOSPADM

## 2025-07-29 RX ORDER — BISACODYL 10 MG
10 SUPPOSITORY, RECTAL RECTAL DAILY PRN
Status: DISCONTINUED | OUTPATIENT
Start: 2025-07-29 | End: 2025-07-30 | Stop reason: HOSPADM

## 2025-07-29 RX ORDER — SENNA AND DOCUSATE SODIUM 50; 8.6 MG/1; MG/1
1 TABLET, FILM COATED ORAL 2 TIMES DAILY
Status: DISCONTINUED | OUTPATIENT
Start: 2025-07-29 | End: 2025-07-30 | Stop reason: HOSPADM

## 2025-07-29 RX ORDER — CELECOXIB 200 MG/1
200 CAPSULE ORAL ONCE
Status: COMPLETED | OUTPATIENT
Start: 2025-07-29 | End: 2025-07-29

## 2025-07-29 RX ORDER — SODIUM CHLORIDE 0.9 % (FLUSH) 0.9 %
5-40 SYRINGE (ML) INJECTION EVERY 12 HOURS SCHEDULED
Status: DISCONTINUED | OUTPATIENT
Start: 2025-07-29 | End: 2025-07-30 | Stop reason: HOSPADM

## 2025-07-29 RX ORDER — OXYCODONE HYDROCHLORIDE 5 MG/1
5 TABLET ORAL
Status: DISCONTINUED | OUTPATIENT
Start: 2025-07-29 | End: 2025-07-30 | Stop reason: HOSPADM

## 2025-07-29 RX ORDER — ACETAMINOPHEN 500 MG
1000 TABLET ORAL ONCE
Status: DISCONTINUED | OUTPATIENT
Start: 2025-07-29 | End: 2025-07-29 | Stop reason: HOSPADM

## 2025-07-29 RX ORDER — HYDRALAZINE HYDROCHLORIDE 20 MG/ML
10 INJECTION INTRAMUSCULAR; INTRAVENOUS
Status: DISCONTINUED | OUTPATIENT
Start: 2025-07-29 | End: 2025-07-29 | Stop reason: HOSPADM

## 2025-07-29 RX ORDER — OXYCODONE HYDROCHLORIDE 5 MG/1
2.5 TABLET ORAL
Status: DISCONTINUED | OUTPATIENT
Start: 2025-07-29 | End: 2025-07-30 | Stop reason: HOSPADM

## 2025-07-29 RX ORDER — PROPOFOL 10 MG/ML
INJECTION, EMULSION INTRAVENOUS
Status: DISCONTINUED | OUTPATIENT
Start: 2025-07-29 | End: 2025-07-29 | Stop reason: SDUPTHER

## 2025-07-29 RX ORDER — ONDANSETRON 2 MG/ML
INJECTION INTRAMUSCULAR; INTRAVENOUS
Status: DISCONTINUED | OUTPATIENT
Start: 2025-07-29 | End: 2025-07-29 | Stop reason: SDUPTHER

## 2025-07-29 RX ORDER — AMLODIPINE BESYLATE 5 MG/1
2.5 TABLET ORAL DAILY
Status: DISCONTINUED | OUTPATIENT
Start: 2025-07-30 | End: 2025-07-30 | Stop reason: HOSPADM

## 2025-07-29 RX ORDER — ONDANSETRON 2 MG/ML
4 INJECTION INTRAMUSCULAR; INTRAVENOUS EVERY 6 HOURS PRN
Status: DISCONTINUED | OUTPATIENT
Start: 2025-07-29 | End: 2025-07-30 | Stop reason: HOSPADM

## 2025-07-29 RX ORDER — KETOROLAC TROMETHAMINE 30 MG/ML
15 INJECTION, SOLUTION INTRAMUSCULAR; INTRAVENOUS EVERY 6 HOURS
Status: DISCONTINUED | OUTPATIENT
Start: 2025-07-29 | End: 2025-07-30 | Stop reason: HOSPADM

## 2025-07-29 RX ORDER — MIDAZOLAM HYDROCHLORIDE 2 MG/2ML
2 INJECTION, SOLUTION INTRAMUSCULAR; INTRAVENOUS PRN
Status: DISCONTINUED | OUTPATIENT
Start: 2025-07-29 | End: 2025-07-29 | Stop reason: HOSPADM

## 2025-07-29 RX ADMIN — SODIUM CHLORIDE, PRESERVATIVE FREE 10 ML: 5 INJECTION INTRAVENOUS at 22:07

## 2025-07-29 RX ADMIN — KETOROLAC TROMETHAMINE 15 MG: 30 INJECTION, SOLUTION INTRAMUSCULAR; INTRAVENOUS at 22:06

## 2025-07-29 RX ADMIN — ONDANSETRON 4 MG: 2 INJECTION INTRAMUSCULAR; INTRAVENOUS at 13:17

## 2025-07-29 RX ADMIN — CELECOXIB 200 MG: 200 CAPSULE ORAL at 09:47

## 2025-07-29 RX ADMIN — ACETAMINOPHEN 1000 MG: 500 TABLET ORAL at 09:47

## 2025-07-29 RX ADMIN — HYDROMORPHONE HYDROCHLORIDE 1 MG: 1 INJECTION, SOLUTION INTRAMUSCULAR; INTRAVENOUS; SUBCUTANEOUS at 13:23

## 2025-07-29 RX ADMIN — HYDROMORPHONE HYDROCHLORIDE 0.5 MG: 1 INJECTION, SOLUTION INTRAMUSCULAR; INTRAVENOUS; SUBCUTANEOUS at 17:13

## 2025-07-29 RX ADMIN — OXYCODONE 5 MG: 5 TABLET ORAL at 19:09

## 2025-07-29 RX ADMIN — PHENYLEPHRINE HYDROCHLORIDE 20 MCG/MIN: 10 INJECTION INTRAVENOUS at 11:22

## 2025-07-29 RX ADMIN — WATER 2000 MG: 1 INJECTION INTRAMUSCULAR; INTRAVENOUS; SUBCUTANEOUS at 11:18

## 2025-07-29 RX ADMIN — DOCUSATE SODIUM 50MG AND SENNOSIDES 8.6MG 1 TABLET: 8.6; 5 TABLET, FILM COATED ORAL at 22:07

## 2025-07-29 RX ADMIN — ACETAMINOPHEN 500 MG: 500 TABLET ORAL at 22:07

## 2025-07-29 RX ADMIN — TRANEXAMIC ACID 1000 MG: 100 INJECTION, SOLUTION INTRAVENOUS at 11:21

## 2025-07-29 RX ADMIN — MIDAZOLAM HYDROCHLORIDE 2 MG: 1 INJECTION, SOLUTION INTRAMUSCULAR; INTRAVENOUS at 10:15

## 2025-07-29 RX ADMIN — PROPOFOL 50 MCG/KG/MIN: 10 INJECTION, EMULSION INTRAVENOUS at 11:15

## 2025-07-29 RX ADMIN — DEXAMETHASONE SODIUM PHOSPHATE 8 MG: 4 INJECTION, SOLUTION INTRAMUSCULAR; INTRAVENOUS at 11:20

## 2025-07-29 RX ADMIN — WATER 2000 MG: 1 INJECTION INTRAMUSCULAR; INTRAVENOUS; SUBCUTANEOUS at 21:29

## 2025-07-29 RX ADMIN — ASPIRIN 81 MG: 81 TABLET, COATED ORAL at 22:59

## 2025-07-29 RX ADMIN — ROPIVACAINE HYDROCHLORIDE 30 ML: 5 INJECTION EPIDURAL; INFILTRATION; PERINEURAL at 10:15

## 2025-07-29 RX ADMIN — FENTANYL CITRATE 50 MCG: 50 INJECTION INTRAMUSCULAR; INTRAVENOUS at 10:15

## 2025-07-29 RX ADMIN — SODIUM CHLORIDE: 9 INJECTION, SOLUTION INTRAVENOUS at 18:18

## 2025-07-29 RX ADMIN — HYDROMORPHONE HYDROCHLORIDE 0.5 MG: 1 INJECTION, SOLUTION INTRAMUSCULAR; INTRAVENOUS; SUBCUTANEOUS at 16:56

## 2025-07-29 RX ADMIN — SODIUM CHLORIDE, SODIUM LACTATE, POTASSIUM CHLORIDE, AND CALCIUM CHLORIDE: .6; .31; .03; .02 INJECTION, SOLUTION INTRAVENOUS at 10:03

## 2025-07-29 ASSESSMENT — PAIN DESCRIPTION - DESCRIPTORS
DESCRIPTORS: ACHING
DESCRIPTORS: ACHING;DISCOMFORT

## 2025-07-29 ASSESSMENT — PAIN SCALES - GENERAL
PAINLEVEL_OUTOF10: 5
PAINLEVEL_OUTOF10: 3
PAINLEVEL_OUTOF10: 5
PAINLEVEL_OUTOF10: 5

## 2025-07-29 ASSESSMENT — PAIN DESCRIPTION - LOCATION
LOCATION: LEG
LOCATION: KNEE
LOCATION: KNEE

## 2025-07-29 ASSESSMENT — PAIN DESCRIPTION - ORIENTATION
ORIENTATION: LEFT

## 2025-07-29 ASSESSMENT — PAIN - FUNCTIONAL ASSESSMENT: PAIN_FUNCTIONAL_ASSESSMENT: 0-10

## 2025-07-29 ASSESSMENT — ENCOUNTER SYMPTOMS: SHORTNESS OF BREATH: 1

## 2025-07-29 NOTE — FLOWSHEET NOTE
07/29/25 1156   Family Communication    Relationship to Patient Other relative    Phone Number Barbara Carey [phone]   Family/Significant Other Update Other (comment);Called   Family Communication   Family Update Message Procedure started     NIECE   CALLED/NO RESPONSE

## 2025-07-29 NOTE — ANESTHESIA PROCEDURE NOTES
Peripheral Block    Patient location during procedure: pre-op  Reason for block: post-op pain management and at surgeon's request  Start time: 7/29/2025 10:15 AM  Staffing  Performed: anesthesiologist   Anesthesiologist: Abhay Cohn MD  Performed by: Abhay Cohn MD  Authorized by: Abhay Cohn MD    Preanesthetic Checklist  Completed: patient identified, IV checked, site marked, risks and benefits discussed, surgical/procedural consents, equipment checked, pre-op evaluation, timeout performed, anesthesia consent given, oxygen available, monitors applied/VS acknowledged and fire risk safety assessment completed and verbalized  Peripheral Block   Patient position: supine  Prep: ChloraPrep  Provider prep: mask and sterile gloves  Patient monitoring: cardiac monitor, continuous pulse ox, frequent blood pressure checks, IV access and oxygen  Block type: Saphenous  Laterality: left  Injection technique: single-shot  Guidance: ultrasound guided  Local infiltration: ropivacaine  Infiltration strength: 0.5 %  Local infiltration: ropivacaineDose: 30 mL    Needle   Needle type: insulated echogenic nerve stimulator needle   Needle gauge: 21 G  Needle localization: anatomical landmarks and ultrasound guidance  Needle length: 10 cm  Assessment   Injection assessment: negative aspiration for heme, no paresthesia on injection, local visualized surrounding nerve on ultrasound and no intravascular symptoms  Paresthesia pain: none  Slow fractionated injection: yes  Hemodynamics: stable  Outcomes: uncomplicated and patient tolerated procedure well    Medications Administered  ropivacaine (NAROPIN) injection 0.5% - Perineural   30 mL - 7/29/2025 10:15:00 AM

## 2025-07-29 NOTE — PROGRESS NOTES
TRANSFER - OUT REPORT:    Verbal report given to AMBER Bean on Billie Nelson  being transferred to  for routine post-op       Report consisted of patient's Situation, Background, Assessment and   Recommendations(SBAR).     Information from the following report(s) Adult Overview, Surgery Report, Intake/Output, and MAR was reviewed with the receiving nurse.           Lines:   Peripheral IV 07/29/25 Right Antecubital (Active)   Site Assessment Clean, dry & intact 07/29/25 1900   Line Status Flushed;Infusing 07/29/25 1900   Line Care Cap changed;Connections checked and tightened 07/29/25 1900   Phlebitis Assessment No symptoms 07/29/25 1900   Infiltration Assessment 0 07/29/25 1900   Alcohol Cap Used Yes 07/29/25 1900   Dressing Status Clean, dry & intact 07/29/25 1900   Dressing Type Transparent 07/29/25 1900        Opportunity for questions and clarification was provided.      Patient transported with:  Tech

## 2025-07-29 NOTE — DISCHARGE INSTRUCTIONS
Post-op Discharge Instructions Following Total Joint Replacement  Bruce Mccall MD  [facility]  [phone]  Follow-up Office Visit  See Sylvia Agustin PA-C approximately 3-4 weeks from date of surgery. Call [phone] to make an appointment.  If you have any postop questions for Dr. Mccall's clinical team, please call [facility] [phone].  Activity  Use your walker for ambulation.  Weight bearing as tolerated unless instructed otherwise by the physical therapist. Get up every hour you are awake and take a brief walk. Lengthen walking distance daily as your strength improves.  Continue using your walker until seen in the office for your first follow up visit.  Practice your exercises 3 times daily as instructed by the physical therapist. Ice for 20 minutes after exercising.  No driving until seen in the office for your first follow up visit.  Incision Care  The surgical dressing is waterproof and is to remain on your incision for 7 days. On the 7th day, carefully lift the edge of the dressing to break the adhesive seal and gently peel it off.  If your surgical dressing comes loose or falls off before the 7th day, replace it with a dry sterile gauze dressing and change this dressing daily. Once there is no drainage on the bandage, you mean leave the incision open to air.  You may take a shower with the surgical dressing in place. After you remove the surgical dressing on day 7, you may continue to shower and get your incision wet in the shower. Do not submerge your incision under water in a bathtub, hot tub, swimming pool, etc. until after you have been evaluated at your first office visit.  You will see a strip of mesh tape applied to your incision.  Leave this mesh tape in place.  As the tape becomes loose from the skin, you may trim the loose edges with clean scissors.  If the tape is still in place 3 weeks after surgery, you may gently remove it while in the shower.  Medications  Blood Clot

## 2025-07-29 NOTE — PROGRESS NOTES
Ortho Progress Note    POD# 0  s/p LEFT TOTAL KNEE ARTHROPLASTY (SPINAL WITH BLOCK)   Pt seen in PACU lying in bed, awake and alert. Denies nausea, vomiting, diarrhea. Reports no pain currently. Lives with son and niece.    HX of R TKA 2023-doing well.      VSS Afebrile.    Visit Vitals  BP (!) 122/99   Pulse 66   Temp (!) 96.7 °F (35.9 °C) (Axillary)   Resp 16   SpO2 97%               Labs    Lab Results   Component Value Date/Time    HGB 12.9 07/15/2025 08:24 AM      Lab Results   Component Value Date/Time    INR 1.0 07/15/2025 08:24 AM      Lab Results   Component Value Date/Time     07/15/2025 08:24 AM    K 4.4 07/15/2025 08:24 AM     07/15/2025 08:24 AM    CO2 29 07/15/2025 08:24 AM    BUN 21 07/15/2025 08:24 AM     Recent Glucose Results:   Glucose   Date Value Ref Range Status   07/15/2025 91 65 - 100 mg/dL Final   01/06/2025 75 65 - 100 mg/dL Final   08/27/2024 81 65 - 100 mg/dL Final   06/20/2024 78 70 - 99 mg/dL Final   02/20/2024 94 70 - 99 mg/dL Final   01/18/2024 98 70 - 99 mg/dL Final           There is no height or weight on file to calculate BMI. : A BMI > 30 is classified as obesity and > 40 is classified as morbid obesity.     Awake and alert. No acute distress.    Dressing: Silver Dressing and Ace Wrap C.D.I.   No significant erythema or swelling  Cryotherapy in place over incision.   BLE sensation to light touch intact  BLE motor intact. Strength 5/5  Pulses symmetric 2+, Calves soft ad non tender.    SCD for mechanical DVT proph while in bed        PLAN:  - PT BID - WBAT once on floor  -  DVT Prophylaxis: Aspirin 81 mg BID   - Pain control - scheduled tylenol  and toradol, and prn  oxycodone    - Plan discharge tomorrow to home.        Gricelda Lama PA-C, [facility] [place]thopedic Physician Assistant

## 2025-07-29 NOTE — ANESTHESIA PRE PROCEDURE
Current medications:    Current Facility-Administered Medications   Medication Dose Route Frequency Provider Last Rate Last Admin    acetaminophen (TYLENOL) tablet 1,000 mg  1,000 mg Oral Once Sylvia Agustin PA-C        tranexamic acid (CYKLOKAPRON) 1,000 mg in sodium chloride 0.9 % 110 mL IVPB (addEASE)  1,000 mg IntraVENous On Call to OR Sylvia Agustin PA-C        sodium chloride flush 0.9 % injection 5-40 mL  5-40 mL IntraVENous 2 times per day Sylvia Agustin PA-C        sodium chloride flush 0.9 % injection 5-40 mL  5-40 mL IntraVENous PRN Sylvia Agustin PA-C        0.9 % sodium chloride infusion   IntraVENous PRN Sylvia Agustin PA-C        ceFAZolin (ANCEF) 2,000 mg in sterile water 20 mL IV syringe  2,000 mg IntraVENous On Call to OR Sylvia Agustin PA-C        lidocaine PF 1 % injection 1 mL  1 mL IntraDERmal Once PRN Abhay Cohn MD        fentaNYL (SUBLIMAZE) injection 100 mcg  100 mcg IntraVENous Once PRN Abhay Cohn MD        lactated ringers infusion   IntraVENous Continuous Abhay Cohn  mL/hr at 07/29/25 1003 New Bag at 07/29/25 1003    sodium chloride flush 0.9 % injection 5-40 mL  5-40 mL IntraVENous 2 times per day Abhay Cohn MD        sodium chloride flush 0.9 % injection 5-40 mL  5-40 mL IntraVENous PRN Abhay Cohn MD        0.9 % sodium chloride infusion   IntraVENous PRN Abhay Cohn MD        midazolam PF (VERSED) IntraVENous 2 mg  2 mg IntraVENous PRN Abhay Cohn MD        fentaNYL (SUBLIMAZE) injection 100 mcg  100 mcg IntraVENous Once Abhay Cohn MD        midazolam PF (VERSED) IntraVENous 2 mg  2 mg IntraVENous Once Abhay Cohn MD        ROPivacaine (NAROPIN) 0.5% injection 30 mL  30 mL Infiltration Once Abhay Cohn MD           Allergies:    Allergies   Allergen Reactions    Minocycline Other (See Comments)     Told by MD she is allergic.  She does not know the reaction.    Other Diarrhea

## 2025-07-29 NOTE — ANESTHESIA POSTPROCEDURE EVALUATION
Post-Anesthesia Evaluation and Assessment    Patient: Billie Nelson MRN: [id number]  SSN: [id number]    YOB: 1955  Age: 69 y.o.  Sex: female      I have evaluated the patient and they are stable and ready for discharge from the PACU.     Cardiovascular Function/Vital Signs  Visit Vitals  BP (!) 122/99   Pulse 66   Temp (!) 96.7 °F (35.9 °C) (Axillary)   Resp 16   SpO2 97%       Patient is status post Spinal anesthesia for Procedure(s):  LEFT TOTAL KNEE ARTHROPLASTY (SPINAL WITH BLOCK).    Nausea/Vomiting: None    Postoperative hydration reviewed and adequate.    Pain:      Managed    Neurological Status:       At baseline    Mental Status, Level of Consciousness: Alert and  oriented to person, place, and time    Pulmonary Status:       Adequate oxygenation and airway patent    Complications related to anesthesia: None    Post-anesthesia assessment completed. No concerns    Signed By: Dax Spaulding MD     July 29, 2025

## 2025-07-29 NOTE — BRIEF OP NOTE
Brief Postoperative Note      Patient: Billie Nelson  YOB: 1955  MRN: [id number]    Date of Procedure: 7/29/2025    Pre-Op Diagnosis Codes:      * Osteoarthritis of left knee [M17.12]    Post-Op Diagnosis: Same       Procedure(s):  LEFT TOTAL KNEE ARTHROPLASTY (SPINAL WITH BLOCK)    Surgeon(s):  Bruce Mccall MD    Assistant:  Surgical Assistant: Johan Deutsch Ron  Physician Assistant: Sylvia Agustin PA-C    Anesthesia: Spinal    Estimated Blood Loss (mL): 200     Complications: None    Specimens:   * No specimens in log *    Implants:  Implant Name Type Inv. Item Serial No.  Lot No. LRB No. Used Action   CEMENT BNE MED VISC 80 GM GENTAMICIN PALACOS MV+G PRO - SN/A  CEMENT BNE MED VISC 80 GM GENTAMICIN PALACOS MV+G PRO N/A [facility] [id number] Left 1 Implanted   COMPONENT FEM SZ 7 LT KNEE NP EMPOWR 3D - SN/A  COMPONENT FEM SZ 7 LT KNEE NP EMPOWR 3D N/A [facility] [id number] Left 1 Implanted   BASEPLATE TIB SZ 7 LT KNEE NP STEMMABLE EMPOWR - SN/A  BASEPLATE TIB SZ 7 LT KNEE NP STEMMABLE EMPOWR N/A [facility] [id number] Left 1 Implanted   INSERT TIB SZ 7 [id number] LT KNEE EMPOWR 3D E + - SN/A  INSERT TIB SZ 7 [id number] LT KNEE EMPOWR 3D E + N/A [employer] [facility] [id number] Left 1 Implanted   COMPONENT PATELLAR 3 PEG 32X8 MM KNEE DOMED POLYETH E-PLUS - SN/A  COMPONENT PATELLAR 3 PEG 32X8 MM KNEE DOMED POLYETH E-PLUS N/A [employer] [facility] [id number] Left 1 Implanted         Drains: * No LDAs found *    Findings:  Present At Time Of Surgery (PATOS) (choose all levels that have infection present):  No infection present  Other Findings: oa left knee    Electronically signed by Sylvia Agustin PA-C on 7/29/2025 at 1:23 PM

## 2025-07-30 VITALS
DIASTOLIC BLOOD PRESSURE: 79 MMHG | TEMPERATURE: 98.2 F | RESPIRATION RATE: 16 BRPM | OXYGEN SATURATION: 100 % | HEART RATE: 93 BPM | SYSTOLIC BLOOD PRESSURE: 110 MMHG

## 2025-07-30 LAB
ANION GAP SERPL CALC-SCNC: 12 MMOL/L (ref 2–14)
BUN SERPL-MCNC: 24 MG/DL (ref 8–23)
BUN/CREAT SERPL: 24 (ref 12–20)
CALCIUM SERPL-MCNC: 9.1 MG/DL (ref 8.8–10.2)
CHLORIDE SERPL-SCNC: 108 MMOL/L (ref 98–107)
CO2 SERPL-SCNC: 19 MMOL/L (ref 20–29)
CREAT SERPL-MCNC: 0.99 MG/DL (ref 0.6–1)
GLUCOSE SERPL-MCNC: 99 MG/DL (ref 65–100)
HCT VFR BLD AUTO: 27.6 % (ref 35–47)
HGB BLD-MCNC: 8.8 G/DL (ref 11.5–16)
POTASSIUM SERPL-SCNC: 4.4 MMOL/L (ref 3.5–5.1)
SODIUM SERPL-SCNC: 139 MMOL/L (ref 136–145)

## 2025-07-30 PROCEDURE — 6370000000 HC RX 637 (ALT 250 FOR IP): Performed by: PHYSICIAN ASSISTANT

## 2025-07-30 PROCEDURE — 97530 THERAPEUTIC ACTIVITIES: CPT

## 2025-07-30 PROCEDURE — 97116 GAIT TRAINING THERAPY: CPT

## 2025-07-30 PROCEDURE — 96374 THER/PROPH/DIAG INJ IV PUSH: CPT

## 2025-07-30 PROCEDURE — 2500000003 HC RX 250 WO HCPCS: Performed by: PHYSICIAN ASSISTANT

## 2025-07-30 PROCEDURE — G0378 HOSPITAL OBSERVATION PER HR: HCPCS

## 2025-07-30 PROCEDURE — 80048 BASIC METABOLIC PNL TOTAL CA: CPT

## 2025-07-30 PROCEDURE — 96376 TX/PRO/DX INJ SAME DRUG ADON: CPT

## 2025-07-30 PROCEDURE — 99024 POSTOP FOLLOW-UP VISIT: CPT | Performed by: PHYSICIAN ASSISTANT

## 2025-07-30 PROCEDURE — 97161 PT EVAL LOW COMPLEX 20 MIN: CPT

## 2025-07-30 PROCEDURE — 6360000002 HC RX W HCPCS: Performed by: PHYSICIAN ASSISTANT

## 2025-07-30 PROCEDURE — 85014 HEMATOCRIT: CPT

## 2025-07-30 PROCEDURE — 97110 THERAPEUTIC EXERCISES: CPT

## 2025-07-30 PROCEDURE — 85018 HEMOGLOBIN: CPT

## 2025-07-30 RX ORDER — ASPIRIN 81 MG/1
81 TABLET ORAL 2 TIMES DAILY
Qty: 60 TABLET | Refills: 0 | Status: SHIPPED | OUTPATIENT
Start: 2025-07-30

## 2025-07-30 RX ORDER — BUPROPION HYDROCHLORIDE 100 MG/1
100 TABLET, EXTENDED RELEASE ORAL DAILY
Status: DISCONTINUED | OUTPATIENT
Start: 2025-07-30 | End: 2025-07-30 | Stop reason: HOSPADM

## 2025-07-30 RX ORDER — ACETAMINOPHEN 500 MG
500 TABLET ORAL EVERY 4 HOURS
Qty: 100 TABLET | Refills: 0 | Status: SHIPPED | OUTPATIENT
Start: 2025-07-30

## 2025-07-30 RX ORDER — MELOXICAM 15 MG/1
15 TABLET ORAL DAILY
Qty: 30 TABLET | Refills: 0 | Status: SHIPPED | OUTPATIENT
Start: 2025-07-30

## 2025-07-30 RX ORDER — OXYCODONE HYDROCHLORIDE 5 MG/1
2.5-5 TABLET ORAL EVERY 4 HOURS PRN
Qty: 42 TABLET | Refills: 0 | Status: SHIPPED | OUTPATIENT
Start: 2025-07-30 | End: 2025-08-06

## 2025-07-30 RX ORDER — AMOXICILLIN 250 MG
1 CAPSULE ORAL 2 TIMES DAILY PRN
Qty: 60 TABLET | Refills: 0 | Status: SHIPPED | OUTPATIENT
Start: 2025-07-30

## 2025-07-30 RX ADMIN — PRAVASTATIN SODIUM 20 MG: 10 TABLET ORAL at 09:04

## 2025-07-30 RX ADMIN — KETOROLAC TROMETHAMINE 15 MG: 30 INJECTION, SOLUTION INTRAMUSCULAR; INTRAVENOUS at 09:02

## 2025-07-30 RX ADMIN — BUPROPION HYDROCHLORIDE 100 MG: 100 TABLET, FILM COATED, EXTENDED RELEASE ORAL at 09:03

## 2025-07-30 RX ADMIN — OXYCODONE 5 MG: 5 TABLET ORAL at 04:51

## 2025-07-30 RX ADMIN — WATER 2000 MG: 1 INJECTION INTRAMUSCULAR; INTRAVENOUS; SUBCUTANEOUS at 06:24

## 2025-07-30 RX ADMIN — DOCUSATE SODIUM 50MG AND SENNOSIDES 8.6MG 1 TABLET: 8.6; 5 TABLET, FILM COATED ORAL at 09:03

## 2025-07-30 RX ADMIN — ASPIRIN 81 MG: 81 TABLET, COATED ORAL at 09:03

## 2025-07-30 RX ADMIN — KETOROLAC TROMETHAMINE 15 MG: 30 INJECTION, SOLUTION INTRAMUSCULAR; INTRAVENOUS at 04:48

## 2025-07-30 RX ADMIN — ACETAMINOPHEN 500 MG: 500 TABLET ORAL at 09:03

## 2025-07-30 RX ADMIN — ACETAMINOPHEN 500 MG: 500 TABLET ORAL at 04:49

## 2025-07-30 RX ADMIN — SODIUM CHLORIDE, PRESERVATIVE FREE 10 ML: 5 INJECTION INTRAVENOUS at 09:04

## 2025-07-30 ASSESSMENT — PAIN DESCRIPTION - ORIENTATION: ORIENTATION: LEFT

## 2025-07-30 ASSESSMENT — PAIN - FUNCTIONAL ASSESSMENT: PAIN_FUNCTIONAL_ASSESSMENT: ACTIVITIES ARE NOT PREVENTED

## 2025-07-30 ASSESSMENT — PAIN SCALES - GENERAL: PAINLEVEL_OUTOF10: 7

## 2025-07-30 ASSESSMENT — PAIN DESCRIPTION - LOCATION: LOCATION: LEG;KNEE

## 2025-07-30 ASSESSMENT — PAIN DESCRIPTION - DESCRIPTORS: DESCRIPTORS: ACHING;DISCOMFORT

## 2025-07-30 NOTE — PROGRESS NOTES
2200- Patient assessed and ambulated around the room and tolerated well. No dizziness, no nausea and vomiting

## 2025-07-30 NOTE — CARE COORDINATION
Care Management Initial Assessment     D/c plan:  - home with family and [facility]  - niece to transport at d/c       RUR: N/A - Observation  Readmission? No  1st IM letter given? N/A - Observation  1st  letter given: N/A    CM met with patient at the bedside to introduce self and CM role.     Home set up: w son, 2 level, 1st fl DOWNS, chair lift to main level  ADLs: indep and   DME: chair lift x2, WC, RW x2, rollator x2  PCP confirmed: yes - Dr. Oma Krause  Previous Home Health: [facility]  Insurance confirmed: yes - [facility] and [employer]  Pharmacy: [facility]  Emergency Contact: son/Poncho - [phone]  Transportation: niece to transport at d/c    HH FOC given - patient already established with [facility] and would like to continue at d/c. Per patient, niece traveling up from [place] to be with patient for about 1 week. Patient has another family member traveling in after niece leaves to provide another weeks worth of support at home.    CM sent HH referral to [facility] via [employer], pending acceptance.    11:40AM - [facility] accepting, CM added to AVS.    At this time, all questions have been answered.       07/30/25 1118   Service Assessment   Patient Orientation Alert and Oriented;Person;Place;Situation;Self   Cognition Alert   History Provided By Patient   Primary Caregiver Self   Support Systems Family Members   Patient's Healthcare Decision Maker is: Named in Scanned ACP Document   PCP Verified by CM Yes   Last Visit to PCP Within last 3 months   Prior Functional Level Independent in ADLs/IADLs   Can patient return to prior living arrangement Yes   Ability to make needs known: Good   Family able to assist with home care needs: Yes   Would you like for me to discuss the discharge plan with any other family members/significant others, and if so, who? Yes   Social/Functional History   Lives With Son   Type of Home House   Home Layout Two level;Able to Live

## 2025-07-30 NOTE — DISCHARGE SUMMARY
````````````````````                                                        Ortho Discharge Summary    Patient ID:  Billie Nelson  [id number]  female  69 y.o.  1955    Admit date: 7/29/2025    Discharge date: 7/30/2025    Admitting Physician: Bruce Mccall MD     Consulting Physician(s):   Treatment Team:   Bruce Mccall MD Hull, Jason, MD Koirala, Pratichaya, RN Carter, Amy S, PT Sanderlin, Shannon, RN    Date of Surgery:   7/29/2025     Preoperative Diagnosis:  Osteoarthritis of left knee [M17.12]    Postoperative Diagnosis:   * No post-op diagnosis entered *    Procedure(s):   LEFT TOTAL KNEE ARTHROPLASTY (SPINAL WITH BLOCK)     Anesthesia Type:   Spinal     Surgeon: Bruce Mccall MD                            HPI:  Pt is a 69 y.o. female who has a history of Osteoarthritis of left knee [M17.12]  with pain and limitations of activities of daily living who presents at this time for a left LEFT TOTAL KNEE ARTHROPLASTY (SPINAL WITH BLOCK) following the failure of conservative management.    PMH:   Past Medical History:   Diagnosis Date    Arthritis     BOTH HIPS PAINFUL    Gore's cyst     Bursitis     Chronic pain     GERD (gastroesophageal reflux disease)     High cholesterol     Hypertension     Memory disorder     Short term memory loss    Obesity     Osteoarthritis     Pre-diabetes     Sleep apnea     has never been on CPAP, test inclusive    Sleep difficulties     Spinal stenosis, lumbar        There is no height or weight on file to calculate BMI. : A BMI > 30 is classified as obesity and > 40 is classified as morbid obesity.     Medications upon admission :   Prior to Admission Medications   Prescriptions Last Dose Informant Patient Reported? Taking?   Apoaequorin (PREVAGEN) 10 MG CAPS Past Month  Yes Yes   Sig: Take 1 capsule by mouth daily   Multiple Vitamins-Minerals (AIRBORNE PO) Past Month  Yes Yes   Sig: Take 1,000 mg by mouth daily   NONFORMULARY Past Month  Yes Yes   Sig: Inject as directed

## 2025-07-30 NOTE — PLAN OF CARE
Problem: Chronic Conditions and Co-morbidities  Goal: Patient's chronic conditions and co-morbidity symptoms are monitored and maintained or improved  Outcome: Progressing  Flowsheets (Taken 7/29/2025 2000)  Care Plan - Patient's Chronic Conditions and Co-Morbidity Symptoms are Monitored and Maintained or Improved: Monitor and assess patient's chronic conditions and comorbid symptoms for stability, deterioration, or improvement     Problem: Pain  Goal: Verbalizes/displays adequate comfort level or baseline comfort level  Outcome: Progressing     Problem: Safety - Adult  Goal: Free from fall injury  Outcome: Progressing

## 2025-07-30 NOTE — PROGRESS NOTES
Orthopaedics Daily Progress Note                            Date of Surgery:  7/29/2025    Patient: Billie Nelson   YOB: 1955  Age: 69 y.o.      SUBJECTIVE:   1 Day Post-Op following LEFT TOTAL KNEE ARTHROPLASTY (SPINAL WITH BLOCK).      The patient's post operative pain is controlled.  No CP/SOB.  No N/V. The patient's mobility will be evaluated today during PT sessions.    Has ambulated the shaw already.  Ready to d/c home after PT.     OBJECTIVE:     Vital Signs:    /61   Pulse 65   Temp 97.9 °F (36.6 °C)   Resp 18   SpO2 98%     Physical Exam:  General: A&Ox3. The patient is cooperative, and in no acute distress.    Respiratory: Respirations are unlabored.  Surgical site(s): dressing clean, dry  Musculoskeletal: Calves are soft, supple, and non-tender upon palpation.  Motor 5/5.  Neurological:  Neurovascularly intact with good dorsi and plantar flexion.    Pulses symmetrical.    Laboratory Values:             Recent Results (from the past 12 hours)   Basic Metabolic Panel    Collection Time: 07/30/25  5:30 AM   Result Value Ref Range    Sodium 139 136 - 145 mmol/L    Potassium 4.4 3.5 - 5.1 mmol/L    Chloride 108 (H) 98 - 107 mmol/L    CO2 19 (L) 20 - 29 mmol/L    Anion Gap 12 2 - 14 mmol/L    Glucose 99 65 - 100 mg/dL    BUN 24 (H) 8 - 23 MG/DL    Creatinine 0.99 0.60 - 1.00 MG/DL    BUN/Creatinine Ratio 24 (H) 12 - 20      Est, Glom Filt Rate 61 (L) >90 ml/min/1.73m2    Calcium 9.1 8.8 - 10.2 MG/DL   Hemoglobin and Hematocrit    Collection Time: 07/30/25  5:30 AM   Result Value Ref Range    Hemoglobin 8.8 (L) 11.5 - 16.0 g/dL    Hematocrit 27.6 (L) 35.0 - 47.0 %         PLAN:     S/P LEFT TOTAL KNEE ARTHROPLASTY (SPINAL WITH BLOCK) -Continue WBAT.  -Mobilize and continue with PT/OT until discharged     Hemodynamics Hgb today is 8.8.  Acute blood loss anemia as expected. Patient asymptomatic.  Continue to monitor.     Wound Monitor postop dressing; no postop dressing changes

## 2025-07-30 NOTE — OP NOTE
[address]  [address]                            OPERATIVE REPORT      PATIENT NAME: TEODORA MULLINS             : 1955  MED REC NO: [id number]                       ROOM: 563  ACCOUNT NO: [id number]                       ADMIT DATE: 2025  PROVIDER: Bruce Mccall MD    DATE OF SERVICE:  2025    PREOPERATIVE DIAGNOSES:  Osteoarthritis, left knee.    POSTOPERATIVE DIAGNOSES:  Osteoarthritis, left knee.    PROCEDURES PERFORMED:  Left total knee arthroplasty.    SURGEON:  Bruce Mccall MD    ASSISTANT:  First assistant, Sylvia Agustin PA-C    ANESTHESIA:  Spinal sedation as well as adductor canal block.    ESTIMATED BLOOD LOSS:  200 mL.    SPECIMENS REMOVED:  None.     COMPLICATIONS:  None.    IMPLANTS:  [employer] Empowr 3D size 7 femur size 7 tibial tray with 14 mm polyethylene insert and 32 mm patella.    INDICATIONS:  The patient is a 69-year-old female with progressive left knee pain due to severe osteoarthritis.  Symptoms have progressed despite comprehensive conservative treatment.  She presents for left total knee replacement.  Risks, benefits, alternatives of procedure were reviewed with her in detail.  She desires to proceed.    DESCRIPTION OF PROCEDURE:  Anesthesia team performed an adductor canal block in the left thigh before taking the patient to the operating room they also placed a spinal.  Preoperative IV antibiotics were administered.  Padded pneumatic tourniquet was placed around the left upper thigh.  Left lower extremity was prepped and draped in usual sterile fashion.  Tourniquet was inflated to 300.  Through midline anterior knee incision, I performed a medial parapatellar arthrotomy.  Progressive medial release was performed to facilitate exposure and soft tissue balance throughout the procedure.  As soon as the knee was flexed, the tourniquet was released.  10 mm distal femoral resection was performed

## 2025-07-30 NOTE — PROGRESS NOTES
PHYSICAL THERAPY EVALUATION/DISCHARGE    Patient: Billie Nelson (69 y.o. female)  Date: 7/30/2025  Primary Diagnosis: Osteoarthritis of left knee [M17.12]  Primary osteoarthritis of left knee [M17.12]  Procedure(s) (LRB):  LEFT TOTAL KNEE ARTHROPLASTY (SPINAL WITH BLOCK) (Left) 1 Day Post-Op   Precautions: Restrictions/Precautions  Restrictions/Precautions: Weight Bearing, Surgical Protocols Lower Extremity Weight Bearing Restrictions  Left Lower Extremity Weight Bearing: Weight Bearing As Tolerated          ASSESSMENT AND RECOMMENDATIONS:  Based on the objective data below, the patient demonstrates independence with bed mobility, supervision with transfers, and ambulation with rolling walker x 160 feet with CGA with good tolerance.  Pt denies L knee pain with ambulation but does report mild instability of her L knee.  No episodes of buckling or overt loss of balance noted and pt demonstrated good balance with rolling walker support.  Pt cleared on stairs.  Reviewed HEP and pt reports she has notebook at home to refer to regarding exercises. Pt only reports pain with active L knee flexion, 3/10.  Reviewed general precautions and importance of frequent cryotherapy.  Pt is cleared for discharge from a PT standpoint.            Further skilled acute physical therapy is not indicated at this time.       PLAN :  Recommendation for discharge: (in order for the patient to meet his/her long term goals):   Intermittent physical therapy up to 2-3x/week in previous living setting    Other factors to consider for discharge: no additional factors    IF patient discharges home will need the following DME: patient owns DME required for discharge       SUBJECTIVE:   Patient stated “I only have pain when I am bending my knee(active knee flexion in sitting).”    OBJECTIVE DATA SUMMARY:     Past Medical History:   Diagnosis Date    Arthritis     BOTH HIPS PAINFUL    Gore's cyst     Bursitis     Chronic pain     GERD  ambulation, 3/10 with knee flexion exercise   Pain Intervention(s):   patient medicated for pain prior to session and ice    Activity Tolerance:   Good    After treatment:   Patient left in no apparent distress sitting up in chair and Call bell within reach      COMMUNICATION/EDUCATION:   The patient's plan of care was discussed with: registered nurse    Patient Education  Education Given To: Patient  Education Provided: Plan of Care;Home Exercise Program;Precautions;Transfer Training;Mobility Training  Education Provided Comments: HEP, general precautions, mobility and gait training  Education Method: Verbal  Barriers to Learning: None  Education Outcome: Verbalized understanding    Thank you for this referral.  JERRY GARCIA, PT  Minutes: 65      Physical Therapy Evaluation Charge Determination   History Examination Presentation Decision-Making   LOW Complexity : Zero comorbidities / personal factors that will impact the outcome / POC LOW Complexity : 1-2 Standardized tests and measures addressing body structure, function, activity limitation and / or participation in recreation  LOW Complexity : Stable, uncomplicated    LOW      Based on the above components, the patient evaluation is determined to be of the following complexity level: Low

## 2025-07-30 NOTE — PLAN OF CARE
Problem: Chronic Conditions and Co-morbidities  Goal: Patient's chronic conditions and co-morbidity symptoms are monitored and maintained or improved  7/30/2025 1159 by Celso Davila RN  Outcome: Progressing  7/30/2025 0111 by Purvi Perez RN  Outcome: Progressing     Problem: Pain  Goal: Verbalizes/displays adequate comfort level or baseline comfort level  7/30/2025 1159 by Celso Davila RN  Outcome: Progressing  7/30/2025 0111 by Purvi Perez RN  Outcome: Progressing     Problem: Safety - Adult  Goal: Free from fall injury  7/30/2025 1159 by Celso Davila RN  Outcome: Progressing  7/30/2025 0111 by Purvi Perez RN  Outcome: Progressing     Problem: ABCDS Injury Assessment  Goal: Absence of physical injury  Outcome: Progressing

## 2025-07-31 ENCOUNTER — TELEPHONE (OUTPATIENT)
Age: 70
End: 2025-07-31

## 2025-07-31 NOTE — TELEPHONE ENCOUNTER
Care Transitions Initial Follow Up Call    Outreach made within 2 business days of discharge: Yes    Patient: Billie Nelson Patient : 1955   MRN: [id number]  Reason for Admission: left knee arthroplasty  Discharge Date: 25       Spoke with: patient    Discharge department/facility: [facility] Interactive Patient Contact:  Was patient able to fill all prescriptions: Yes  Was patient instructed to bring all medications to the follow-up visit: Yes  Is patient taking all medications as directed in the discharge summary? Yes  Does patient understand their discharge instructions: Yes  Does patient have questions or concerns that need addressed prior to 7-14 day follow up office visit: no    Additional needs identified to be addressed with provider  No needs identified             Scheduled appointment with PCP within 7-14 days    Follow Up  Future Appointments   Date Time Provider Department Center   2025  9:00 AM Sylvia Agustin PA-C [facility] BS [facility]   2025 11:00 AM Ellyn Alford MD [id number] BS [facility]   2025 11:40 AM Oma Krause MD [facility] DEP   2025  9:20 AM Kylee Carpenter ANP [id number] BS AMB   10/7/2025 11:00 AM Oma Krause MD [facility]       Andria Broderick

## 2025-08-03 ENCOUNTER — HOSPITAL ENCOUNTER (EMERGENCY)
Facility: HOSPITAL | Age: 70
Discharge: HOME | End: 2025-08-04
Attending: EMERGENCY MEDICINE
Payer: MEDICARE

## 2025-08-03 DIAGNOSIS — M79.89 LEFT LEG SWELLING: Primary | ICD-10-CM

## 2025-08-03 DIAGNOSIS — G89.18 POST-OP PAIN: ICD-10-CM

## 2025-08-03 LAB
COMMENT:: NORMAL
SPECIMEN HOLD: NORMAL

## 2025-08-03 PROCEDURE — 85025 COMPLETE CBC W/AUTO DIFF WBC: CPT

## 2025-08-03 PROCEDURE — 99284 EMERGENCY DEPT VISIT MOD MDM: CPT

## 2025-08-03 PROCEDURE — 80053 COMPREHEN METABOLIC PANEL: CPT

## 2025-08-03 PROCEDURE — 85610 PROTHROMBIN TIME: CPT

## 2025-08-03 ASSESSMENT — PAIN SCALES - GENERAL: PAINLEVEL_OUTOF10: 1

## 2025-08-03 ASSESSMENT — PAIN - FUNCTIONAL ASSESSMENT
PAIN_FUNCTIONAL_ASSESSMENT: 0-10
PAIN_FUNCTIONAL_ASSESSMENT: ACTIVITIES ARE NOT PREVENTED

## 2025-08-03 ASSESSMENT — PAIN DESCRIPTION - LOCATION: LOCATION: LEG

## 2025-08-03 ASSESSMENT — PAIN DESCRIPTION - ONSET: ONSET: GRADUAL

## 2025-08-03 ASSESSMENT — PAIN DESCRIPTION - ORIENTATION: ORIENTATION: LEFT

## 2025-08-03 ASSESSMENT — PAIN DESCRIPTION - PAIN TYPE: TYPE: ACUTE PAIN

## 2025-08-03 ASSESSMENT — PAIN DESCRIPTION - FREQUENCY: FREQUENCY: INTERMITTENT

## 2025-08-03 ASSESSMENT — PAIN DESCRIPTION - DESCRIPTORS: DESCRIPTORS: TIGHTNESS

## 2025-08-04 ENCOUNTER — APPOINTMENT (OUTPATIENT)
Facility: HOSPITAL | Age: 70
End: 2025-08-04
Payer: MEDICARE

## 2025-08-04 ENCOUNTER — APPOINTMENT (OUTPATIENT)
Facility: HOSPITAL | Age: 70
End: 2025-08-04
Attending: EMERGENCY MEDICINE
Payer: MEDICARE

## 2025-08-04 VITALS
HEIGHT: 64 IN | WEIGHT: 214 LBS | SYSTOLIC BLOOD PRESSURE: 144 MMHG | DIASTOLIC BLOOD PRESSURE: 70 MMHG | TEMPERATURE: 98.6 F | BODY MASS INDEX: 36.54 KG/M2 | OXYGEN SATURATION: 100 % | RESPIRATION RATE: 18 BRPM | HEART RATE: 69 BPM

## 2025-08-04 LAB
ALBUMIN SERPL-MCNC: 3.2 G/DL (ref 3.5–5.2)
ALBUMIN/GLOB SERPL: 1 (ref 1.1–2.2)
ALP SERPL-CCNC: 82 U/L (ref 35–104)
ALT SERPL-CCNC: 22 U/L (ref 10–50)
ANION GAP SERPL CALC-SCNC: 10 MMOL/L (ref 2–14)
AST SERPL-CCNC: 27 U/L (ref 10–35)
BASOPHILS # BLD: 0.03 K/UL (ref 0–0.1)
BASOPHILS NFR BLD: 0.3 % (ref 0–1)
BILIRUB SERPL-MCNC: 0.7 MG/DL (ref 0–1.2)
BUN SERPL-MCNC: 24 MG/DL (ref 8–23)
BUN/CREAT SERPL: 24 (ref 12–20)
CALCIUM SERPL-MCNC: 9.6 MG/DL (ref 8.8–10.2)
CHLORIDE SERPL-SCNC: 106 MMOL/L (ref 98–107)
CO2 SERPL-SCNC: 25 MMOL/L (ref 20–29)
CREAT SERPL-MCNC: 0.96 MG/DL (ref 0.6–1)
DIFFERENTIAL METHOD BLD: ABNORMAL
EOSINOPHIL # BLD: 0.34 K/UL (ref 0–0.4)
EOSINOPHIL NFR BLD: 3.7 % (ref 0–7)
ERYTHROCYTE [DISTWIDTH] IN BLOOD BY AUTOMATED COUNT: 14.8 % (ref 11.5–14.5)
GLOBULIN SER CALC-MCNC: 3.2 G/DL (ref 2–4)
GLUCOSE SERPL-MCNC: 98 MG/DL (ref 65–100)
HCT VFR BLD AUTO: 26.8 % (ref 35–47)
HGB BLD-MCNC: 8.5 G/DL (ref 11.5–16)
IMM GRANULOCYTES # BLD AUTO: 0.05 K/UL (ref 0–0.04)
IMM GRANULOCYTES NFR BLD AUTO: 0.5 % (ref 0–0.5)
INR PPP: 0.9 (ref 0.9–1.1)
LYMPHOCYTES # BLD: 2.24 K/UL (ref 0.8–3.5)
LYMPHOCYTES NFR BLD: 24.2 % (ref 12–49)
MCH RBC QN AUTO: 31.5 PG (ref 26–34)
MCHC RBC AUTO-ENTMCNC: 31.7 G/DL (ref 30–36.5)
MCV RBC AUTO: 99.3 FL (ref 80–99)
MONOCYTES # BLD: 0.81 K/UL (ref 0–1)
MONOCYTES NFR BLD: 8.8 % (ref 5–13)
NEUTS SEG # BLD: 5.77 K/UL (ref 1.8–8)
NEUTS SEG NFR BLD: 62.5 % (ref 32–75)
NRBC # BLD: 0.02 K/UL (ref 0–0.01)
NRBC BLD-RTO: 0.2 PER 100 WBC
PLATELET # BLD AUTO: 341 K/UL (ref 150–400)
PMV BLD AUTO: 10.5 FL (ref 8.9–12.9)
POTASSIUM SERPL-SCNC: 4.5 MMOL/L (ref 3.5–5.1)
PROT SERPL-MCNC: 6.3 G/DL (ref 6.4–8.3)
PROTHROMBIN TIME: 10.1 SEC (ref 9.2–11.2)
RBC # BLD AUTO: 2.7 M/UL (ref 3.8–5.2)
SODIUM SERPL-SCNC: 140 MMOL/L (ref 136–145)
WBC # BLD AUTO: 9.2 K/UL (ref 3.6–11)

## 2025-08-04 PROCEDURE — 93971 EXTREMITY STUDY: CPT

## 2025-08-04 PROCEDURE — 73562 X-RAY EXAM OF KNEE 3: CPT

## 2025-08-04 PROCEDURE — 6370000000 HC RX 637 (ALT 250 FOR IP): Performed by: EMERGENCY MEDICINE

## 2025-08-04 RX ORDER — OXYCODONE HYDROCHLORIDE 5 MG/1
5 TABLET ORAL EVERY 4 HOURS PRN
Refills: 0 | Status: DISCONTINUED | OUTPATIENT
Start: 2025-08-04 | End: 2025-08-04 | Stop reason: HOSPADM

## 2025-08-04 RX ADMIN — OXYCODONE 5 MG: 5 TABLET ORAL at 01:49

## 2025-08-04 ASSESSMENT — ENCOUNTER SYMPTOMS
SHORTNESS OF BREATH: 0
COUGH: 0
VOMITING: 0
NAUSEA: 0
ABDOMINAL PAIN: 0
DIARRHEA: 0
ABDOMINAL DISTENTION: 0
BLOOD IN STOOL: 0
ANAL BLEEDING: 0

## 2025-08-09 LAB — ECHO BSA: 2.09 M2

## 2025-08-22 ENCOUNTER — TELEPHONE (OUTPATIENT)
Age: 70
End: 2025-08-22

## 2025-08-22 ENCOUNTER — TELEMEDICINE (OUTPATIENT)
Age: 70
End: 2025-08-22
Payer: MEDICARE

## 2025-08-22 VITALS — BODY MASS INDEX: 36.22 KG/M2 | SYSTOLIC BLOOD PRESSURE: 134 MMHG | WEIGHT: 211 LBS | DIASTOLIC BLOOD PRESSURE: 81 MMHG

## 2025-08-22 DIAGNOSIS — E66.01 CLASS 2 SEVERE OBESITY DUE TO EXCESS CALORIES WITH SERIOUS COMORBIDITY AND BODY MASS INDEX (BMI) OF 36.0 TO 36.9 IN ADULT (HCC): Primary | ICD-10-CM

## 2025-08-22 DIAGNOSIS — E66.812 CLASS 2 SEVERE OBESITY DUE TO EXCESS CALORIES WITH SERIOUS COMORBIDITY AND BODY MASS INDEX (BMI) OF 36.0 TO 36.9 IN ADULT (HCC): Primary | ICD-10-CM

## 2025-08-22 DIAGNOSIS — E78.2 MIXED HYPERLIPIDEMIA: ICD-10-CM

## 2025-08-22 DIAGNOSIS — G47.8 UNREFRESHED BY SLEEP: ICD-10-CM

## 2025-08-22 DIAGNOSIS — E11.9 TYPE 2 DIABETES MELLITUS WITHOUT COMPLICATION, WITHOUT LONG-TERM CURRENT USE OF INSULIN (HCC): ICD-10-CM

## 2025-08-22 PROCEDURE — G8427 DOCREV CUR MEDS BY ELIG CLIN: HCPCS | Performed by: FAMILY MEDICINE

## 2025-08-22 PROCEDURE — G8399 PT W/DXA RESULTS DOCUMENT: HCPCS | Performed by: FAMILY MEDICINE

## 2025-08-22 PROCEDURE — 1036F TOBACCO NON-USER: CPT | Performed by: FAMILY MEDICINE

## 2025-08-22 PROCEDURE — G8417 CALC BMI ABV UP PARAM F/U: HCPCS | Performed by: FAMILY MEDICINE

## 2025-08-22 PROCEDURE — 2022F DILAT RTA XM EVC RTNOPTHY: CPT | Performed by: FAMILY MEDICINE

## 2025-08-22 PROCEDURE — 1123F ACP DISCUSS/DSCN MKR DOCD: CPT | Performed by: FAMILY MEDICINE

## 2025-08-22 PROCEDURE — 1090F PRES/ABSN URINE INCON ASSESS: CPT | Performed by: FAMILY MEDICINE

## 2025-08-22 PROCEDURE — 99214 OFFICE O/P EST MOD 30 MIN: CPT | Performed by: FAMILY MEDICINE

## 2025-08-22 PROCEDURE — 3017F COLORECTAL CA SCREEN DOC REV: CPT | Performed by: FAMILY MEDICINE

## 2025-08-22 PROCEDURE — 3044F HG A1C LEVEL LT 7.0%: CPT | Performed by: FAMILY MEDICINE

## 2025-08-22 ASSESSMENT — PATIENT HEALTH QUESTIONNAIRE - PHQ9
SUM OF ALL RESPONSES TO PHQ QUESTIONS 1-9: 0
2. FEELING DOWN, DEPRESSED OR HOPELESS: NOT AT ALL
SUM OF ALL RESPONSES TO PHQ QUESTIONS 1-9: 0
SUM OF ALL RESPONSES TO PHQ QUESTIONS 1-9: 0
1. LITTLE INTEREST OR PLEASURE IN DOING THINGS: NOT AT ALL
SUM OF ALL RESPONSES TO PHQ QUESTIONS 1-9: 0

## 2025-08-25 ENCOUNTER — TELEMEDICINE (OUTPATIENT)
Age: 70
End: 2025-08-25

## 2025-08-25 DIAGNOSIS — E66.01 CLASS 2 SEVERE OBESITY DUE TO EXCESS CALORIES WITH SERIOUS COMORBIDITY AND BODY MASS INDEX (BMI) OF 36.0 TO 36.9 IN ADULT (HCC): Primary | ICD-10-CM

## 2025-08-25 DIAGNOSIS — E66.812 CLASS 2 SEVERE OBESITY DUE TO EXCESS CALORIES WITH SERIOUS COMORBIDITY AND BODY MASS INDEX (BMI) OF 36.0 TO 36.9 IN ADULT (HCC): Primary | ICD-10-CM

## 2025-08-26 ENCOUNTER — OFFICE VISIT (OUTPATIENT)
Age: 70
End: 2025-08-26
Payer: MEDICARE

## 2025-08-26 VITALS
OXYGEN SATURATION: 98 % | HEART RATE: 82 BPM | SYSTOLIC BLOOD PRESSURE: 132 MMHG | DIASTOLIC BLOOD PRESSURE: 68 MMHG | RESPIRATION RATE: 23 BRPM | TEMPERATURE: 98.2 F | HEIGHT: 64 IN | WEIGHT: 220.8 LBS | BODY MASS INDEX: 37.69 KG/M2

## 2025-08-26 DIAGNOSIS — E78.00 HYPERCHOLESTEROLEMIA: ICD-10-CM

## 2025-08-26 DIAGNOSIS — Z96.651 STATUS POST TOTAL RIGHT KNEE REPLACEMENT: ICD-10-CM

## 2025-08-26 DIAGNOSIS — I10 PRIMARY HYPERTENSION: Primary | ICD-10-CM

## 2025-08-26 DIAGNOSIS — R73.03 PREDIABETES: ICD-10-CM

## 2025-08-26 DIAGNOSIS — Z79.899 ENCOUNTER FOR LONG-TERM (CURRENT) USE OF MEDICATIONS: ICD-10-CM

## 2025-08-26 PROCEDURE — 3075F SYST BP GE 130 - 139MM HG: CPT | Performed by: FAMILY MEDICINE

## 2025-08-26 PROCEDURE — G8399 PT W/DXA RESULTS DOCUMENT: HCPCS | Performed by: FAMILY MEDICINE

## 2025-08-26 PROCEDURE — 1090F PRES/ABSN URINE INCON ASSESS: CPT | Performed by: FAMILY MEDICINE

## 2025-08-26 PROCEDURE — 3017F COLORECTAL CA SCREEN DOC REV: CPT | Performed by: FAMILY MEDICINE

## 2025-08-26 PROCEDURE — G8417 CALC BMI ABV UP PARAM F/U: HCPCS | Performed by: FAMILY MEDICINE

## 2025-08-26 PROCEDURE — 1126F AMNT PAIN NOTED NONE PRSNT: CPT | Performed by: FAMILY MEDICINE

## 2025-08-26 PROCEDURE — 1123F ACP DISCUSS/DSCN MKR DOCD: CPT | Performed by: FAMILY MEDICINE

## 2025-08-26 PROCEDURE — 1036F TOBACCO NON-USER: CPT | Performed by: FAMILY MEDICINE

## 2025-08-26 PROCEDURE — 99214 OFFICE O/P EST MOD 30 MIN: CPT | Performed by: FAMILY MEDICINE

## 2025-08-26 PROCEDURE — 1160F RVW MEDS BY RX/DR IN RCRD: CPT | Performed by: FAMILY MEDICINE

## 2025-08-26 PROCEDURE — G8427 DOCREV CUR MEDS BY ELIG CLIN: HCPCS | Performed by: FAMILY MEDICINE

## 2025-08-26 PROCEDURE — 1159F MED LIST DOCD IN RCRD: CPT | Performed by: FAMILY MEDICINE

## 2025-08-26 PROCEDURE — 3078F DIAST BP <80 MM HG: CPT | Performed by: FAMILY MEDICINE

## 2025-08-26 RX ORDER — AMOXICILLIN 500 MG/1
CAPSULE ORAL
Qty: 12 CAPSULE | Refills: 1 | Status: SHIPPED | OUTPATIENT
Start: 2025-08-26

## 2025-08-26 RX ORDER — AMOXICILLIN 500 MG/1
500 CAPSULE ORAL 2 TIMES DAILY
Qty: 10 CAPSULE | Refills: 3 | Status: CANCELLED | OUTPATIENT
Start: 2025-08-26

## 2025-08-26 SDOH — ECONOMIC STABILITY: FOOD INSECURITY: WITHIN THE PAST 12 MONTHS, YOU WORRIED THAT YOUR FOOD WOULD RUN OUT BEFORE YOU GOT MONEY TO BUY MORE.: NEVER TRUE

## 2025-08-26 SDOH — ECONOMIC STABILITY: FOOD INSECURITY: WITHIN THE PAST 12 MONTHS, THE FOOD YOU BOUGHT JUST DIDN'T LAST AND YOU DIDN'T HAVE MONEY TO GET MORE.: NEVER TRUE

## 2025-08-26 ASSESSMENT — ENCOUNTER SYMPTOMS
COUGH: 0
SHORTNESS OF BREATH: 0
RHINORRHEA: 0
BLOOD IN STOOL: 0
CHEST TIGHTNESS: 0
ABDOMINAL PAIN: 0
CONSTIPATION: 0

## 2025-08-26 ASSESSMENT — ANXIETY QUESTIONNAIRES
GAD7 TOTAL SCORE: 0
3. WORRYING TOO MUCH ABOUT DIFFERENT THINGS: NOT AT ALL
1. FEELING NERVOUS, ANXIOUS, OR ON EDGE: NOT AT ALL
6. BECOMING EASILY ANNOYED OR IRRITABLE: NOT AT ALL
4. TROUBLE RELAXING: NOT AT ALL
2. NOT BEING ABLE TO STOP OR CONTROL WORRYING: NOT AT ALL
GAD7 TOTAL SCORE: 0
5. BEING SO RESTLESS THAT IT IS HARD TO SIT STILL: NOT AT ALL
7. FEELING AFRAID AS IF SOMETHING AWFUL MIGHT HAPPEN: NOT AT ALL

## 2025-08-26 ASSESSMENT — PATIENT HEALTH QUESTIONNAIRE - PHQ9
SUM OF ALL RESPONSES TO PHQ QUESTIONS 1-9: 0
2. FEELING DOWN, DEPRESSED OR HOPELESS: NOT AT ALL
SUM OF ALL RESPONSES TO PHQ QUESTIONS 1-9: 0
1. LITTLE INTEREST OR PLEASURE IN DOING THINGS: NOT AT ALL

## 2025-08-29 RX ORDER — PRAVASTATIN SODIUM 20 MG
20 TABLET ORAL DAILY
Qty: 30 TABLET | Refills: 0 | Status: SHIPPED | OUTPATIENT
Start: 2025-08-29

## (undated) DEVICE — CUSTOM CAST PD STR

## (undated) DEVICE — SUTURE VCRL SZ 2-0 L36IN ABSRB UD L40MM CT 1/2 CIR J957H

## (undated) DEVICE — ZIMMER® STERILE DISPOSABLE TOURNIQUET CUFF WITH PLC, DUAL PORT, SINGLE BLADDER, 34 IN. (86 CM)

## (undated) DEVICE — TIP SUCT CRV REG REDI

## (undated) DEVICE — DRAPE,EXTREMITY,89X128,STERILE: Brand: MEDLINE

## (undated) DEVICE — GAUZE,SPONGE,4"X4",12PLY,STRL,LF,10/TRAY: Brand: MEDLINE

## (undated) DEVICE — X-RAY DETECTABLE SPONGES,16 PLY: Brand: VISTEC

## (undated) DEVICE — PREP SKN CHLRAPRP APL 26ML STR --

## (undated) DEVICE — ZIPPERED TOGA, X-LARGE: Brand: FLYTE, SURGICOOL

## (undated) DEVICE — STERILE POLYISOPRENE POWDER-FREE SURGICAL GLOVES: Brand: PROTEXIS

## (undated) DEVICE — BLADE SAW 1.27X90 MM FOR LG BNE [KMS2513PM62STE] [KOMET MEDICAL]

## (undated) DEVICE — ZIPPERED TOGA, 2X LARGE: Brand: FLYTE, SURGICOOL

## (undated) DEVICE — GLOVE SURG SZ 85 L12IN FNGR THK94MIL STD WHT LTX FREE

## (undated) DEVICE — 4-PORT MANIFOLD: Brand: NEPTUNE 2

## (undated) DEVICE — Z DISCONTINUED USE 2744636  DRESSING AQUACEL 14 IN ALG W3.5XL14IN POLYUR FLM CVR W/ HYDRCOLL

## (undated) DEVICE — SOLUTION IV 50ML 0.9% SOD CHL

## (undated) DEVICE — SOLUTION IRRIG 1000ML STRL H2O USP PLAS POUR BTL

## (undated) DEVICE — SUTURE VCRL 1 L27IN ABSRB CT BRAID COAT UD J281H

## (undated) DEVICE — GOWN,PREVENTION PLUS,XLN/2XL,ST,22/CS: Brand: MEDLINE

## (undated) DEVICE — GLOVE SURG SZ 65 L12IN FNGR THK79MIL GRN LTX FREE

## (undated) DEVICE — SYR 10ML LUER LOK 1/5ML GRAD --

## (undated) DEVICE — SOLUTION PULSED LAVAGE XPERIENCE 500ML NO-RINSE

## (undated) DEVICE — SYRINGE MED 10ML LUERLOCK TIP W/O SFTY DISP

## (undated) DEVICE — CONTAINER,SPECIMEN,4OZ,OR STRL: Brand: MEDLINE

## (undated) DEVICE — YANKAUER,FLEXIBLE HANDLE,REGLR CAPACITY: Brand: MEDLINE INDUSTRIES, INC.

## (undated) DEVICE — STERILE POLYISOPRENE POWDER-FREE SURGICAL GLOVES WITH EMOLLIENT COATING: Brand: PROTEXIS

## (undated) DEVICE — PADDING CAST SPEC 6INX4YD COT --

## (undated) DEVICE — SUTURE VICRYL SZ 0 L27IN ABSRB UD L36MM CT-1 1/2 CIR J260H

## (undated) DEVICE — GLOVE SURG SZ 85 L12IN FNGR THK79MIL GRN LTX FREE

## (undated) DEVICE — Device

## (undated) DEVICE — SYR 20ML LL STRL LF --

## (undated) DEVICE — GLOVE SURG SZ 65 L12IN FNGR THK94MIL STD WHT LTX FREE

## (undated) DEVICE — SUTURE STRATAFIX SPRL SZ 1 L14IN ABSRB VLT L48CM CTX 1/2 SXPD2B405

## (undated) DEVICE — SUTURE VICRYL ABSORBABLE BRAIDED 2-0 CT 36 IN DA UD  VCP957H

## (undated) DEVICE — PADDING CAST W6INXL4YD NONSTERILE COT RAYON MICROPLEATED

## (undated) DEVICE — SCRUBIN SCRUB BRUSH DRY STER: Brand: MEDLINE INDUSTRIES, INC.

## (undated) DEVICE — MARKER,SKIN,WI/RULER AND LABELS: Brand: MEDLINE

## (undated) DEVICE — TOTAL JOINT - SMH: Brand: MEDLINE INDUSTRIES, INC.

## (undated) DEVICE — SOLUTION IRRIG 3000ML 0.9% SOD CHL USP UROMATIC PLAS CONT

## (undated) DEVICE — DRESSING HYDROCOLLOID BORDER 35X10 IN ALUM PRIMASEAL

## (undated) DEVICE — SUTURE MONOCRYL + SZ 3-0 L27IN ABSRB UD PS1 L24MM 3/8 CIR REV MCP936H

## (undated) DEVICE — STRYKER PERFORMANCE SERIES SAGITTAL BLADE: Brand: STRYKER PERFORMANCE SERIES

## (undated) DEVICE — T5 HOOD WITH PEEL AWAY FACE SHIELD

## (undated) DEVICE — SPONGE GZ W4XL4IN COT RADPQ HIGHLY ABSRB

## (undated) DEVICE — HYPODERMIC SAFETY NEEDLE: Brand: MAGELLAN

## (undated) DEVICE — SYSTEM NAVIGATION PALM SZ PRECIS ALIGN TECHNOLOGY DISP FOR

## (undated) DEVICE — HANDPIECE SET WITH BONE CLEANING TIP AND SUCTION TUBE: Brand: INTERPULSE

## (undated) DEVICE — DERMABOND SKIN ADH 0.7ML -- DERMABOND ADVANCED 12/BX

## (undated) DEVICE — TOTAL TRAY, 16FR 10ML SIL FOLEY, URN: Brand: MEDLINE

## (undated) DEVICE — DRESSING ANTIMIC FOAM MEPILEX BORD POSTOP AG PROD SZ 4X12 IN

## (undated) DEVICE — BANDAGE COMPR M W6INXL10YD WHT BGE VELC E MTRX HK AND LOOP

## (undated) DEVICE — ERASECAUTI INTERMIT TRAY: Brand: MEDLINE INDUSTRIES, INC.

## (undated) DEVICE — SUTURE ABSORBABLE MONOFILAMENT 1 CTX 36 CM 48 MM VIO PDS +

## (undated) DEVICE — APPLICATOR MEDICATED 26 CC SOLUTION HI LT ORNG CHLORAPREP

## (undated) DEVICE — SUTURE VCRL SZ 0 L27IN ABSRB UD L36MM CT-1 1/2 CIR J260H

## (undated) DEVICE — SPONGE GZ W4XL4IN COT 12 PLY TYP VII WVN C FLD DSGN

## (undated) DEVICE — SCRUB DRY SURG EZ SCRUB BRUSH PREOPERATIVE GRN

## (undated) DEVICE — SYRINGE 20ML LL S/C 50

## (undated) DEVICE — ELECTRODE PT RET AD L9FT HI MOIST COND ADH HYDRGEL CORDED

## (undated) DEVICE — SUTURE MCRYL SZ 3-0 L27IN ABSRB UD L24MM PS-1 3/8 CIR PRIM Y936H

## (undated) DEVICE — SUTURE VICRYL 1 L27IN ABSRB CT BRAID COAT UD J281H

## (undated) DEVICE — INTENT OT USE PROVIDES A STERILE INTERFACE BETWEEN THE OPERATING ROOM SURGICAL LAMPS (NON-STERILE) AND THE SURGEON OR STAFF WORKING IN THE STERILE FIELD.: Brand: ASPEN® ALC PLUS LIGHT HANDLE COVER

## (undated) DEVICE — SOLUTION SURG PREP 26 CC PURPREP

## (undated) DEVICE — CARTRIDGE BNE CEM MIX UNIV TWR VAC ROTOR BRK OFF NOZ W/O

## (undated) DEVICE — NEEDLE HYPO 21GA L1IN GRN S STL HUB POLYPR SHLD REG BVL

## (undated) DEVICE — GAMMEX® NON-LATEX PI ORTHO SIZE 8, STERILE POLYISOPRENE POWDER-FREE SURGICAL GLOVE: Brand: GAMMEX